# Patient Record
Sex: FEMALE | Race: BLACK OR AFRICAN AMERICAN | Employment: OTHER | URBAN - METROPOLITAN AREA
[De-identification: names, ages, dates, MRNs, and addresses within clinical notes are randomized per-mention and may not be internally consistent; named-entity substitution may affect disease eponyms.]

---

## 2017-04-28 ENCOUNTER — HOSPITAL ENCOUNTER (OUTPATIENT)
Dept: MAMMOGRAPHY | Age: 82
Discharge: HOME | End: 2017-04-28
Attending: NURSE PRACTITIONER
Payer: MEDICARE

## 2017-04-28 ENCOUNTER — HOSPITAL ENCOUNTER (OUTPATIENT)
Dept: LAB | Age: 82
Discharge: HOME | End: 2017-04-28

## 2017-04-28 DIAGNOSIS — Z12.31 VISIT FOR SCREENING MAMMOGRAM: ICD-10-CM

## 2017-04-28 PROCEDURE — 99001 SPECIMEN HANDLING PT-LAB: CPT | Performed by: FAMILY MEDICINE

## 2017-04-28 PROCEDURE — 77063 BREAST TOMOSYNTHESIS BI: CPT

## 2017-05-10 ENCOUNTER — HOSPITAL ENCOUNTER (OUTPATIENT)
Dept: MAMMOGRAPHY | Age: 82
Discharge: HOME | End: 2017-05-10
Payer: MEDICARE

## 2017-05-10 DIAGNOSIS — R92.8 ABNORMAL MAMMOGRAM: ICD-10-CM

## 2017-05-10 PROCEDURE — 77065 DX MAMMO INCL CAD UNI: CPT

## 2017-06-06 ENCOUNTER — ANESTHESIA EVENT (OUTPATIENT)
Dept: ENDOSCOPY | Age: 82
End: 2017-06-06
Payer: MEDICARE

## 2017-06-07 ENCOUNTER — ANESTHESIA (OUTPATIENT)
Dept: ENDOSCOPY | Age: 82
End: 2017-06-07
Payer: MEDICARE

## 2017-06-07 PROCEDURE — 74011250636 HC RX REV CODE- 250/636

## 2017-06-07 RX ORDER — PROPOFOL 10 MG/ML
INJECTION, EMULSION INTRAVENOUS AS NEEDED
Status: DISCONTINUED | OUTPATIENT
Start: 2017-06-07 | End: 2017-06-07 | Stop reason: HOSPADM

## 2017-06-07 RX ADMIN — PROPOFOL 20 MG: 10 INJECTION, EMULSION INTRAVENOUS at 10:37

## 2017-06-07 RX ADMIN — PROPOFOL 20 MG: 10 INJECTION, EMULSION INTRAVENOUS at 10:48

## 2017-06-07 RX ADMIN — PROPOFOL 20 MG: 10 INJECTION, EMULSION INTRAVENOUS at 10:53

## 2017-06-07 RX ADMIN — PROPOFOL 20 MG: 10 INJECTION, EMULSION INTRAVENOUS at 10:45

## 2017-06-07 RX ADMIN — PROPOFOL 20 MG: 10 INJECTION, EMULSION INTRAVENOUS at 10:42

## 2017-06-07 RX ADMIN — PROPOFOL 70 MG: 10 INJECTION, EMULSION INTRAVENOUS at 10:35

## 2017-06-07 NOTE — ANESTHESIA PREPROCEDURE EVALUATION
Anesthetic History   No history of anesthetic complications            Review of Systems / Medical History  Patient summary reviewed and pertinent labs reviewed    Pulmonary  Within defined limits                 Neuro/Psych   Within defined limits           Cardiovascular    Hypertension: well controlled          Hyperlipidemia    Exercise tolerance: <4 METS     GI/Hepatic/Renal  Within defined limits              Endo/Other    Diabetes: well controlled, type 2         Other Findings   Comments:   Risk Factors for Postoperative nausea/vomiting:       History of postoperative nausea/vomiting? NO       Female? YES       Motion sickness? NO       Intended opioid administration for postoperative analgesia? NO      Smoking Abstinence  Current Smoker? NO  Elective Surgery? YES  Seen preoperatively by anesthesiologist or proxy prior to day of surgery? YES  Pt abstained from smoking 24 hours prior to anesthesia?  N/A           Physical Exam    Airway  Mallampati: III  TM Distance: 4 - 6 cm  Neck ROM: decreased range of motion   Mouth opening: Normal     Cardiovascular    Rhythm: regular  Rate: normal         Dental    Dentition: Poor dentition     Pulmonary  Breath sounds clear to auscultation               Abdominal  GI exam deferred       Other Findings            Anesthetic Plan    ASA: 3  Anesthesia type: MAC            Anesthetic plan and risks discussed with: Patient

## 2017-06-20 ENCOUNTER — TELEPHONE (OUTPATIENT)
Dept: SURGERY | Age: 82
End: 2017-06-20

## 2017-06-20 NOTE — TELEPHONE ENCOUNTER
----- Message from Kait Mccullough MD sent at 6/9/2017 12:38 PM EDT -----  Benign polyp(s). Repeat colonoscopy in 5 years as planned. Notified patient of pathology results. Yogi in [place] for 5 years. Patient understands.

## 2017-09-29 ENCOUNTER — HOSPITAL ENCOUNTER (OUTPATIENT)
Dept: LAB | Age: 82
Discharge: HOME | End: 2017-09-29

## 2017-09-29 PROCEDURE — 99001 SPECIMEN HANDLING PT-LAB: CPT | Performed by: FAMILY MEDICINE

## 2017-10-11 ENCOUNTER — HOSPITAL ENCOUNTER (EMERGENCY)
Age: 82
Discharge: HOME | End: 2017-10-11
Attending: EMERGENCY MEDICINE
Payer: MEDICARE

## 2017-10-11 ENCOUNTER — APPOINTMENT (OUTPATIENT)
Dept: GENERAL RADIOLOGY | Age: 82
End: 2017-10-11
Attending: EMERGENCY MEDICINE
Payer: MEDICARE

## 2017-10-11 ENCOUNTER — APPOINTMENT (OUTPATIENT)
Dept: CT IMAGING | Age: 82
End: 2017-10-11
Attending: EMERGENCY MEDICINE
Payer: MEDICARE

## 2017-10-11 VITALS
TEMPERATURE: 98.2 F | SYSTOLIC BLOOD PRESSURE: 135 MMHG | BODY MASS INDEX: 30.22 KG/M2 | WEIGHT: 177 LBS | OXYGEN SATURATION: 97 % | HEIGHT: 64 IN | RESPIRATION RATE: 18 BRPM | HEART RATE: 77 BPM | DIASTOLIC BLOOD PRESSURE: 70 MMHG
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
## 2017-10-11 DIAGNOSIS — H57.89 RED EYE: ICD-10-CM

## 2017-10-11 DIAGNOSIS — S09.90XA MINOR HEAD INJURY, INITIAL ENCOUNTER: ICD-10-CM

## 2017-10-11 DIAGNOSIS — M25.552 PAIN OF LEFT HIP JOINT: ICD-10-CM

## 2017-10-11 PROCEDURE — 99284 EMERGENCY DEPT VISIT MOD MDM: CPT

## 2017-10-11 PROCEDURE — 72170 X-RAY EXAM OF PELVIS: CPT

## 2017-10-11 PROCEDURE — 70450 CT HEAD/BRAIN W/O DYE: CPT

## 2017-10-11 NOTE — ED PROVIDER NOTES
HPI Comments: 81yo F w/ PMH of HTN, DM, cataracts p/a a fall. Pt was trying to change a light bulb when she fell from a chair 5 days ago. She bumped her head and her right hip. Since that time she had been ambulating normally without headaches. She noticed today that her L eye was very red and decided to come get checked out. Pt states her ophthalmologist Dr. Trish Schirmer told her to come in immediately if her eye turns red. Her visual acuity is unchanged from previous and she has no eye pain or headache. Patient is a 80 y.o. female presenting with fall, head injury, and eye irritation. Fall   Pertinent negatives include no fever, no nausea, no vomiting and no headaches. Head Injury    Pertinent negatives include no vomiting. Red Eye    Associated symptoms include head injury. Pertinent negatives include no nausea, no vomiting and no fever. Past Medical History:   Diagnosis Date    Bronchitis     Diabetes ([facility] [address]     Hyperlipemia     Hypertension        Past Surgical History:   Procedure Laterality Date    COLONOSCOPY N/A 6/7/2017    COLONOSCOPY / polypectomy performed by Tamera Buenrostro MD at [facility] ENDOSCOPY    HX CATARACT REMOVAL      HX COLONOSCOPY      2011         Family History:   Problem Relation Age of Onset    Heart Disease Mother     Diabetes Father        Social History     Social History    Marital status:      Spouse name: N/A    Number of children: N/A    Years of education: N/A     Occupational History    Not on file. Social History Main Topics    Smoking status: Never Smoker    Smokeless tobacco: Never Used    Alcohol use No    Drug use: No    Sexual activity: Not on file     Other Topics Concern    Not on file     Social History Narrative         ALLERGIES: Review of patient's allergies indicates no known allergies. Review of Systems   Constitutional: Negative for chills and fever. Respiratory: Negative for shortness of breath.     Cardiovascular: Negative for chest pain. Gastrointestinal: Negative for diarrhea, nausea and vomiting. Neurological: Negative for headaches. All other systems reviewed and are negative. Vitals:    10/11/17 1308   BP: 135/70   Pulse: 77   Resp: 18   Temp: 98.2 °F (36.8 °C)   SpO2: 97%   Weight: 80.3 kg (177 lb)   Height: 5' 4\" (1.626 m)            Physical Exam   Constitutional: She is oriented to person, place, and time. She appears well-developed and well-nourished. No distress. HENT:   Head: Normocephalic and atraumatic. Mouth/Throat: Oropharynx is clear and moist.   Eyes: EOM are normal. Right eye exhibits no discharge. Left eye exhibits no discharge. No scleral icterus. Injected conjunctiva in L eye, visual acuity at baseline b/l     Neck: Normal range of motion. Neck supple. No tracheal deviation present. Cardiovascular: Normal rate, regular rhythm and normal heart sounds. No murmur heard. Pulmonary/Chest: Effort normal and breath sounds normal. No respiratory distress. She has no wheezes. She has no rales. Abdominal: Soft. She exhibits no distension. There is no tenderness. There is no rebound and no guarding. Musculoskeletal: Normal range of motion. She exhibits no edema or deformity. Neurological: She is alert and oriented to person, place, and time. No cranial nerve deficit. Skin: Skin is warm and dry. She is not diaphoretic. Psychiatric: She has a normal mood and affect. Her behavior is normal. Judgment and thought content normal.        MDM  Number of Diagnoses or Management Options  Fall, initial encounter:   Minor head injury, initial encounter:   Pain of left hip joint:   Red eye:   Diagnosis management comments: 3:19 PM  Pt s/p fall 5 days ago with head bump and hip pain. Pelvis xray is negative and CT read is pending. Pt has acute red eye with no vision changes or pain.   Discussed with Dr. Amie Habermann from ophthalmology, he recommends outpatient f/u with him today before 4 or tomorrow at 8:30    CT negative pt d/c'ed. ED Course       Procedures         Vitals:  Patient Vitals for the past 12 hrs:   Temp Pulse Resp BP SpO2   10/11/17 1308 98.2 °F (36.8 °C) 77 18 135/70 97 %       Medications ordered:   Medications - No data to display      Lab findings:  No results found for this or any previous visit (from the past 12 hour(s)). X-Ray, CT or other radiology findings or impressions:  XR PELV AP ONLY   Final Result      CT HEAD WO CONT   Final Result          Disposition:  Diagnosis:   1. Fall, initial encounter    2. Minor head injury, initial encounter    3. Pain of left hip joint    4. Red eye        Disposition: Home    Follow-up Information     Follow up With Details Comments [address], [place]   [address] [facility] [phone] [id number] [facility] EMERGENCY DEPT  If symptoms worsen, As needed [address] [facility]  [phone]           Discharge Medication List as of 10/11/2017  3:25 PM      CONTINUE these medications which have NOT CHANGED    Details   CETIRIZINE HCL (CETIRIZINE PO) Take  by mouth., Historical Med      albuterol (PROAIR HFA) 90 mcg/actuation inhaler Take  by inhalation. , Historical Med      MONTELUKAST SODIUM (SINGULAIR PO) Take  by mouth., Historical Med      HYDROcodone-acetaminophen (NORCO) 5-325 mg per tablet Take 1 Tab by mouth every six (6) hours as needed for Pain. Max Daily Amount: 4 Tabs., Print, Disp-10 Tab, R-0      fluticasone-salmeterol (ADVAIR DISKUS) 100-50 mcg/dose diskus inhaler Take 1 Puff by inhalation every twelve (12) hours. , Historical Med      NEBULIZER by Does Not Apply route., Historical Med      Cholecalciferol, Vitamin D3, (VITAMIN D3) 1,000 unit cap Take  by mouth., Historical Med      cloNIDine (CATAPRESS) 0.2 mg tablet Take 0.2 mg by mouth two (2) times a day.  , Historical Med      glimepiride (AMARYL) 4 mg tablet Take 4 mg by mouth daily.   , Historical Med      lisinopril (PRINIVIL, ZESTRIL) 20 mg tablet Take 40 mg by mouth daily. , Historical Med      metoprolol (LOPRESSOR) 100 mg tablet Take 200 mg by mouth daily. , Historical Med      pravastatin (PRAVACHOL) 40 mg tablet Take 40 mg by mouth daily. , Historical Med      verapamil SR (CALAN-SR) 180 mg CR tablet Take 180 mg by mouth daily.   , Historical Med

## 2017-10-11 NOTE — ED TRIAGE NOTES
Patient states climbing and falling from chair from last Tuesday. States striking posterior head during fall. Denies LOC. Patient presents with redness to left eye. She states awakening this morning with left eye redness. C/o lower back pain.

## 2017-10-11 NOTE — DISCHARGE INSTRUCTIONS
Diabetes and Preventing Falls: Care Instructions  Your Care Instructions  If you are an older adult who has diabetes, you may have a higher risk of falling. Complications of diabetes--such as nerve damage, foot problems, and reduced vision--may increase your risk of a fall. Some of your medicines also may add to your risk. By making your home safer, you can lower your risk of falling. Doing things to prevent diabetes complications may also help to lower your risk. You can make your home safer with a few simple measures. Follow-up care is a key part of your treatment and safety. Be sure to make and go to all appointments, and call your doctor if you are having problems. It's also a good idea to know your test results and keep a list of the medicines you take. How can you care for yourself at home? Taking care of yourself  · Keep your blood sugar at a target level (which you set with your doctor). · Exercise regularly to improve your strength, muscle tone, and balance. Walk if you can. Swimming may be a good choice if you cannot walk easily. · Have your vision checked as often as your doctor recommends. It is usually once a year or more often if you have eye problems. · Know the side effects of the medicines you take. Ask your doctor or pharmacist whether the medicines you take can affect your balance. Sleeping pills or sedatives can affect your balance. · Limit the amount of alcohol you drink. Alcohol can impair your balance and other senses. · Have your doctor check your feet during each visit. If you have a foot problem, see your doctor. Preventing falls at home  · Remove raised doorway thresholds, throw rugs, and clutter. Repair loose carpet or raised areas in the floor. · Move furniture and electrical cords to keep them out of walking paths. · Use nonskid floor wax, and wipe up spills right away, especially on ceramic tile floors. · If you use a walker or cane, put rubber tips on it.  If you use crutches, clean the bottoms of them regularly with an abrasive pad, such as steel wool. · Keep your house well lit, especially [place], and outside walkways. Use night-lights in areas such as hallways and bathrooms. Add extra light switches or use remote switches (such as switches that go on or off when you clap your hands) to make it easier to turn lights on if you have to get up during the night. · Install sturdy handrails on stairways. Put grab bars near your shower, bathtub, and toilet. · Store household items on low shelves so that you do not have to climb or reach high. Or use a reaching device that you can get at a medical supply store. If you have to climb for something, use a step stool with handrails, or ask someone to get it for you. · Keep a cordless phone and a flashlight with new batteries by your bed. If possible, put a phone in each of the main rooms of your house, or carry a cell phone in case you fall and cannot reach a phone. Or you can wear a device around your neck or wrist. You push a button that sends a signal for help. · Wear low-heeled shoes that fit well and give your feet good support. Use footwear with nonskid soles. Check the heels and soles of your shoes for wear. Repair or replace worn heels or soles. · Do not wear socks without shoes on wood floors. · Walk on the grass when the sidewalks are slippery. If you live in an area that gets snow and ice in the winter, sprinkle salt on slippery steps and sidewalks. Where can you learn more? Go to http://ranjith-pia.info/. Enter C055 in the search box to learn more about \"Diabetes and Preventing Falls: Care Instructions. \"  Current as of: August 4, 2016  Content Version: 11.3  © 1297-8884 [employer]. Care instructions adapted under license by [employer] (which disclaims liability or warranty for this information).  If you have questions about a medical condition or this instruction, always ask your healthcare professional. [place] [address] any warranty or liability for your use of this information.

## 2017-10-11 NOTE — ED NOTES
Lorie Doss is a 80 y.o. female that was discharged in stable. Pt was accompanied by daughter. Pt is not driving. The patients diagnosis, condition and treatment were explained to  patient and caregiver and aftercare instructions were given. The patient and caregiver verbalized understanding. Patient armband removed and shredded.

## 2017-10-12 ENCOUNTER — DOCUMENTATION ONLY (OUTPATIENT)
Dept: SURGERY | Age: 82
End: 2017-10-12

## 2017-11-29 ENCOUNTER — HOSPITAL ENCOUNTER (EMERGENCY)
Age: 82
Discharge: HOME | End: 2017-11-29
Attending: EMERGENCY MEDICINE
Payer: MEDICARE

## 2017-11-29 ENCOUNTER — APPOINTMENT (OUTPATIENT)
Dept: GENERAL RADIOLOGY | Age: 82
End: 2017-11-29
Attending: EMERGENCY MEDICINE
Payer: MEDICARE

## 2017-11-29 VITALS
DIASTOLIC BLOOD PRESSURE: 77 MMHG | BODY MASS INDEX: 29.88 KG/M2 | TEMPERATURE: 97.8 F | RESPIRATION RATE: 18 BRPM | HEIGHT: 64 IN | OXYGEN SATURATION: 95 % | WEIGHT: 175 LBS | SYSTOLIC BLOOD PRESSURE: 148 MMHG | HEART RATE: 67 BPM

## 2017-11-29 DIAGNOSIS — S60.222A CONTUSION OF LEFT HAND, INITIAL ENCOUNTER: Primary | ICD-10-CM
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
## 2017-11-29 DIAGNOSIS — T07.XXXA ABRASION, MULTIPLE SITES: ICD-10-CM

## 2017-11-29 PROCEDURE — 90715 TDAP VACCINE 7 YRS/> IM: CPT | Performed by: EMERGENCY MEDICINE

## 2017-11-29 PROCEDURE — 73130 X-RAY EXAM OF HAND: CPT

## 2017-11-29 PROCEDURE — 90471 IMMUNIZATION ADMIN: CPT

## 2017-11-29 PROCEDURE — 74011250636 HC RX REV CODE- 250/636: Performed by: EMERGENCY MEDICINE

## 2017-11-29 PROCEDURE — 99282 EMERGENCY DEPT VISIT SF MDM: CPT

## 2017-11-29 RX ORDER — TRAMADOL HYDROCHLORIDE 50 MG/1
50 TABLET ORAL
Qty: 10 TAB | Refills: 0 | Status: SHIPPED | OUTPATIENT
Start: 2017-11-29 | End: 2019-02-02

## 2017-11-29 RX ADMIN — TETANUS TOXOID, REDUCED DIPHTHERIA TOXOID AND ACELLULAR PERTUSSIS VACCINE, ADSORBED 0.5 ML: 5; 2.5; 8; 8; 2.5 SUSPENSION INTRAMUSCULAR at 13:16

## 2017-11-29 NOTE — ED TRIAGE NOTES
Patient states trip and fall incident prior to arrival to ER. She denies LOC. States striking mouth on concrete during fall. Patient noted to have bandaids to right fingers. Patient noted to have swelling and bruising to left hand. Denies pain to hand. Abrasion noted to left knee.

## 2017-11-29 NOTE — ED NOTES
Luis Martínez is a 80 y.o. female that was discharged in stable condition. The patients diagnosis, condition and treatment were explained to  patient and aftercare instructions were given. The patient verbalized understanding. Patient armband removed and shredded.

## 2017-11-29 NOTE — DISCHARGE INSTRUCTIONS
Hand Bruises: Care Instructions  Your Care Instructions  Bruises, or contusions, can happen as a result of an impact or fall. Most people think of a bruise as a black-and-blue spot. This happens when small blood vessels get torn and leak blood under the skin. The bruise may turn purplish black, reddish blue, or yellowish green as it heals. But bones and muscles can also get bruised. This may damage the hand but not cause a bruise that you can see. Most bruises aren't serious and will go away on their own in 2 to 4 weeks. But sometimes a more serious hand injury might not heal on its own. Tell your doctor if you have new symptoms or your injury is not getting better over time. You may have tests to see if you have bone or nerve damage. These tests may include X-rays, a CT scan, or an MRI. If you damaged bones or muscles, you may need more treatment. The doctor has checked you carefully, but problems can develop later. If you notice any problems or new symptoms, get medical treatment right away. Follow-up care is a key part of your treatment and safety. Be sure to make and go to all appointments, and call your doctor if you are having problems. It's also a good idea to know your test results and keep a list of the medicines you take. How can you care for yourself at home? · Put ice or a cold pack on the hand for 10 to 20 minutes at a time. Put a thin cloth between the ice and your skin. · Prop up your hand on a pillow when you ice it or anytime you sit or lie down during the next 3 days. Try to keep your hand above the level of your heart. This will help reduce swelling. · Be safe with medicines. Read and follow all instructions on the label. ¨ If the doctor gave you a prescription medicine for pain, take it as prescribed. ¨ If you are not taking a prescription pain medicine, ask your doctor if you can take an over-the-counter medicine.   · Be sure to follow your doctor's advice about moving and exercising your injured hand. When should you call for help? Call your doctor now or seek immediate medical care if:  ? · Your pain gets worse. ? · You have new or worse swelling. ? · You have tingling, weakness, or numbness in the area near the bruise. ? · The area near the bruise is cold or pale. ? · You have symptoms of infection, such as:  ¨ Increased pain, swelling, warmth, or redness. ¨ Red streaks leading from the area. ¨ Pus draining from the area. ¨ A fever. ? Watch closely for changes in your health, and be sure to contact your doctor if:  ? · You do not get better as expected. Where can you learn more? Go to http://ranjith-pia.info/. Enter J096 in the search box to learn more about \"Hand Bruises: Care Instructions. \"  Current as of: March 20, 2017  Content Version: 11.4  © 7995-0550 [employer]. Care instructions adapted under license by [employer] (which disclaims liability or warranty for this information). If you have questions about a medical condition or this instruction, always ask your healthcare professional. [place] [address] any warranty or liability for your use of this information. Preventing Falls: Care Instructions  Your Care Instructions    Getting around your home safely can be a challenge if you have injuries or health problems that make it easy for you to fall. Loose rugs and furniture in walkways are among the dangers for many older people who have problems walking or who have poor eyesight. People who have conditions such as arthritis, osteoporosis, or dementia also have to be careful not to fall. You can make your home safer with a few simple measures. Follow-up care is a key part of your treatment and safety. Be sure to make and go to all appointments, and call your doctor if you are having problems. It's also a good idea to know your test results and keep a list of the medicines you take.   How can you care for yourself at home? Taking care of yourself  · You may get dizzy if you do not drink enough water. To prevent dehydration, drink plenty of fluids, enough so that your urine is light yellow or clear like water. Choose water and other caffeine-free clear liquids. If you have kidney, heart, or liver disease and have to limit fluids, talk with your doctor before you increase the amount of fluids you drink. · Exercise regularly to improve your strength, muscle tone, and balance. Walk if you can. Swimming may be a good choice if you cannot walk easily. · Have your vision and hearing checked each year or any time you notice a change. If you have trouble seeing and hearing, you might not be able to avoid objects and could lose your balance. · Know the side effects of the medicines you take. Ask your doctor or pharmacist whether the medicines you take can affect your balance. Sleeping pills or sedatives can affect your balance. · Limit the amount of alcohol you drink. Alcohol can impair your balance and other senses. · Ask your doctor whether calluses or corns on your feet need to be removed. If you wear loose-fitting shoes because of calluses or corns, you can lose your balance and fall. · Talk to your doctor if you have numbness in your feet. Preventing falls at home  · Remove raised doorway thresholds, throw rugs, and clutter. Repair loose carpet or raised areas in the floor. · Move furniture and electrical cords to keep them out of walking paths. · Use nonskid floor wax, and wipe up spills right away, especially on ceramic tile floors. · If you use a walker or cane, put rubber tips on it. If you use crutches, clean the bottoms of them regularly with an abrasive pad, such as steel wool. · Keep your house well lit, especially [place], and outside walkways. Use night-lights in areas such as hallways and bathrooms.  Add extra light switches or use remote switches (such as switches that go on or off when you clap your hands) to make it easier to turn lights on if you have to get up during the night. · Install sturdy handrails on stairways. · Move items in your cabinets so that the things you use a lot are on the lower shelves (about waist level). · Keep a cordless phone and a flashlight with new batteries by your bed. If possible, put a phone in each of the main rooms of your house, or carry a cell phone in case you fall and cannot reach a phone. Or, you can wear a device around your neck or wrist. You push a button that sends a signal for help. · Wear low-heeled shoes that fit well and give your feet good support. Use footwear with nonskid soles. Check the heels and soles of your shoes for wear. Repair or replace worn heels or soles. · Do not wear socks without shoes on wood floors. · Walk on the grass when the sidewalks are slippery. If you live in an area that gets snow and ice in the winter, sprinkle salt on slippery steps and sidewalks. Preventing falls in the bath  · Install grab bars and nonskid mats inside and outside your shower or tub and near the toilet and sinks. · Use shower chairs and bath benches. · Use a hand-held shower head that will allow you to sit while showering. · Get into a tub or shower by putting the weaker leg in first. Get out of a tub or shower with your strong side first.  · Repair loose toilet seats and consider installing a raised toilet seat to make getting on and off the toilet easier. · Keep your bathroom door unlocked while you are in the shower. Where can you learn more? Go to http://ranjith-pia.info/. Enter 0476 [id number] 69 [id number] in the search box to learn more about \"Preventing Falls: Care Instructions. \"  Current as of: May 12, 2017  Content Version: 11.4  © 6989-7460 [email or web address]. Care instructions adapted under license by [employer] (which disclaims liability or warranty for this information).  If you have questions about a medical condition or this instruction, always ask your healthcare professional. [place] [address] any warranty or liability for your use of this information.

## 2017-11-29 NOTE — ED PROVIDER NOTES
EMERGENCY DEPARTMENT HISTORY AND PHYSICAL EXAM    12:29 PM      Date: 11/29/2017  Patient Name: [facility]    History of Presenting Illness     Chief Complaint   Patient presents with   Patricio José Fall    Mouth Injury    Finger Pain         History Provided By: Patient    Chief Complaint: Fall, mouth injury   Duration:  N/A  Timing:  N/A  Location: Mouth   Quality: N/A  Severity: 0 out 10   Modifying Factors: None   Associated Symptoms: left hand swelling, small abrasions to the upper lip, right hand and left knee       Additional History (Context): [facility] is a 80 y.o. female with hx of HTN, HLD and DM presenting to the ED with c/o mouth injury after a fall that occurred right before arrival to the ER. Pt reports she tripped on uneven concrete and hit her mouth on the concrete. Pt denies headache, gum soreness, dental injury, bilat hand pain, LOC, nausea, vomiting or diarrhea. Pt has small abrasions to the upper lip, left knee and right hand. Severity is 0/10. Pt also has some moderate left hand swelling, a hematoma noted. Pt denies any other injuries. No other sx or complaints given at this time. PCP: Everardo Moore MD    Current Facility-Administered Medications   Medication Dose Route Frequency Provider Last Rate Last Dose    diph,Pertuss(AC),Tet Vac-PF (BOOSTRIX) suspension 0.5 mL  0.5 mL IntraMUSCular ONCE Equilla Axe, DO         Current Outpatient Prescriptions   Medication Sig Dispense Refill    traMADol (ULTRAM) 50 mg tablet Take 1 Tab by mouth every eight (8) hours as needed for Pain. Max Daily Amount: 150 mg. 10 Tab 0    CETIRIZINE HCL (CETIRIZINE PO) Take  by mouth.  albuterol (PROAIR HFA) 90 mcg/actuation inhaler Take  by inhalation.  MONTELUKAST SODIUM (SINGULAIR PO) Take  by mouth.  fluticasone-salmeterol (ADVAIR DISKUS) 100-50 mcg/dose diskus inhaler Take 1 Puff by inhalation every twelve (12) hours.       NEBULIZER by Does Not Apply route.      Cholecalciferol, Vitamin D3, (VITAMIN D3) 1,000 unit cap Take  by mouth.  cloNIDine (CATAPRESS) 0.2 mg tablet Take 0.2 mg by mouth two (2) times a day.  glimepiride (AMARYL) 4 mg tablet Take 4 mg by mouth daily.  lisinopril (PRINIVIL, ZESTRIL) 20 mg tablet Take 40 mg by mouth daily.  metoprolol (LOPRESSOR) 100 mg tablet Take 200 mg by mouth daily.  pravastatin (PRAVACHOL) 40 mg tablet Take 40 mg by mouth daily.  verapamil SR (CALAN-SR) 180 mg CR tablet Take 180 mg by mouth daily. Past History     Past Medical History:  Past Medical History:   Diagnosis Date    Bronchitis     Diabetes ([address]     Hyperlipemia     Hypertension        Past Surgical History:  Past Surgical History:   Procedure Laterality Date    COLONOSCOPY N/A 6/7/2017    COLONOSCOPY / polypectomy performed by Kartik Evans MD at [facility] ENDOSCOPY    HX CATARACT REMOVAL      HX COLONOSCOPY      2011       Family History:  Family History   Problem Relation Age of Onset    Heart Disease Mother     Diabetes Father        Social History:  Social History   Substance Use Topics    Smoking status: Never Smoker    Smokeless tobacco: Never Used    Alcohol use No       Allergies:  No Known Allergies      Review of Systems       Review of Systems   Constitutional: Negative for fever. HENT: Negative for sore throat. Eyes: Negative for redness. Respiratory: Negative for shortness of breath and wheezing. Gastrointestinal: Negative for abdominal pain and nausea. Genitourinary: Negative for dysuria. Musculoskeletal: Negative for back pain, neck pain and neck stiffness. Left hand swelling    Skin: Negative for pallor. Small abrasions on left knee and right hand   Tiny abrasions to the upper lip    Neurological: Negative for headaches. Hematological: Does not bruise/bleed easily. All other systems reviewed and are negative.         Physical Exam     Visit Vitals    BP 148/77    Pulse 67    Temp 97.8 °F (36.6 °C)    Resp 18    Ht 5' 4\" (1.626 m)    Wt 79.4 kg (175 lb)    SpO2 95%    BMI 30.04 kg/m2         Physical Exam   Constitutional: She is oriented to person, place, and time. She appears well-developed and well-nourished. No distress. Good radial pulse    HENT:   Head: Normocephalic and atraumatic. Mouth/Throat: Oropharynx is clear and moist.   No dental injury   Minimal swelling of upper lip   Tiny abrasions on lips   No other facial bruising   No raccoon eyes   No henson signs    Eyes: Conjunctivae are normal. Pupils are equal, round, and reactive to light. No scleral icterus. Neck: Normal range of motion. Neck supple. Cardiovascular: Intact distal pulses. Capillary refill < 3 seconds  Good radial pulses    Pulmonary/Chest: Effort normal and breath sounds normal. No respiratory distress. She has no wheezes. Abdominal: Soft. Bowel sounds are normal. She exhibits no distension. There is no tenderness. Musculoskeletal: Normal range of motion. She exhibits no edema. Left hand tenderness and swelling   Hematoma at the left hand   Full ROM of head and neck   No midline tenderness   No hip tenderness   Full ROM of bilat LE and UE      Lymphadenopathy:     She has no cervical adenopathy. Neurological: She is alert and oriented to person, place, and time. No cranial nerve deficit. Sensation intact   No slurred speech      Skin: Skin is warm and dry. She is not diaphoretic. Abrasion on the left knee      Nursing note and vitals reviewed. Diagnostic Study Results     Labs -  No results found for this or any previous visit (from the past 12 hour(s)). Radiologic Studies -   XR HAND LT MIN 3 V   Impression:     Soft tissue swelling without fracture. Medical Decision Making   I am the first provider for this patient.     I reviewed the vital signs, available nursing notes, past medical history, past surgical history, family history and social history. Vital Signs-Reviewed the patient's vital signs. ED Course: Progress Notes, Reevaluation, and Consults:      Provider Notes (Medical Decision Making): ddx contusion, fx, strain, abrasion    Pt does not want analgesia in ED, states not in much pain. Gave tdap. I have reassessed the patient. I have discussed the workup, results and plan with the patient and patient is in agreement. Patient is feeling better. Patient will be prescribed tramadol. Patient was discharge in stable condition. Patient was given outpatient follow up. Patient is to return to emergency department if any new or worsening condition. Diagnosis     Clinical Impression:   1. Contusion of left hand, initial encounter    2. Abrasion, multiple sites    3. Fall, initial encounter        Disposition: Discharge     Follow-up Information     Follow up With Details Sahra Emmanuel MD Call in 2 days  [place] [address] [facility] [address] [facility]      Shane Millan MD Call in 2 days  [address] [place] [address] [place] [id number]      [address] [facility] EMERGENCY DEPT  As needed, If symptoms worsen [address] [facility]  [phone]           Patient's Medications   Start Taking    TRAMADOL (ULTRAM) 50 MG TABLET    Take 1 Tab by mouth every eight (8) hours as needed for Pain. Max Daily Amount: 150 mg. Continue Taking    ALBUTEROL (PROAIR HFA) 90 MCG/ACTUATION INHALER    Take  by inhalation. CETIRIZINE HCL (CETIRIZINE PO)    Take  by mouth. CHOLECALCIFEROL, VITAMIN D3, (VITAMIN D3) 1,000 UNIT CAP    Take  by mouth. CLONIDINE (CATAPRESS) 0.2 MG TABLET    Take 0.2 mg by mouth two (2) times a day. FLUTICASONE-SALMETEROL (ADVAIR DISKUS) 100-50 MCG/DOSE DISKUS INHALER    Take 1 Puff by inhalation every twelve (12) hours. GLIMEPIRIDE (AMARYL) 4 MG TABLET    Take 4 mg by mouth daily.       LISINOPRIL (PRINIVIL, ZESTRIL) 20 MG TABLET    Take 40 mg by mouth daily. METOPROLOL (LOPRESSOR) 100 MG TABLET    Take 200 mg by mouth daily. MONTELUKAST SODIUM (SINGULAIR PO)    Take  by mouth. NEBULIZER    by Does Not Apply route. PRAVASTATIN (PRAVACHOL) 40 MG TABLET    Take 40 mg by mouth daily. VERAPAMIL SR (CALAN-SR) 180 MG CR TABLET    Take 180 mg by mouth daily. These Medications have changed    No medications on file   Stop Taking    HYDROCODONE-ACETAMINOPHEN (NORCO) 5-325 MG PER TABLET    Take 1 Tab by mouth every six (6) hours as needed for Pain. Max Daily Amount: 4 Tabs.     _______________________________    Attestations:  Sariah Milner [facility] acting as a scribe for and in the presence of Roxy Deluca DO      November 29, 2017 at 12:29 PM       Provider Attestation:      I personally performed the services described in the documentation, reviewed the documentation, as recorded by the scribe in my presence, and it accurately and completely records my words and actions.  November 29, 2017 at 12:29 PM - Roxy Deluca DO    _______________________________

## 2018-04-29 ENCOUNTER — HOSPITAL ENCOUNTER (EMERGENCY)
Age: 83
Discharge: HOME | End: 2018-04-29
Attending: EMERGENCY MEDICINE
Payer: MEDICARE

## 2018-04-29 ENCOUNTER — APPOINTMENT (OUTPATIENT)
Dept: GENERAL RADIOLOGY | Age: 83
End: 2018-04-29
Attending: EMERGENCY MEDICINE
Payer: MEDICARE

## 2018-04-29 VITALS
HEART RATE: 81 BPM | TEMPERATURE: 97.6 F | WEIGHT: 170 LBS | OXYGEN SATURATION: 99 % | RESPIRATION RATE: 16 BRPM | SYSTOLIC BLOOD PRESSURE: 210 MMHG | BODY MASS INDEX: 27.32 KG/M2 | DIASTOLIC BLOOD PRESSURE: 92 MMHG | HEIGHT: 66 IN

## 2018-04-29 DIAGNOSIS — I10 ESSENTIAL HYPERTENSION: ICD-10-CM

## 2018-04-29 DIAGNOSIS — J20.9 ACUTE BRONCHITIS WITH BRONCHOSPASM: Primary | ICD-10-CM

## 2018-04-29 PROCEDURE — 94640 AIRWAY INHALATION TREATMENT: CPT

## 2018-04-29 PROCEDURE — 77030029684 HC NEB SM VOL KT MONA -A

## 2018-04-29 PROCEDURE — 71046 X-RAY EXAM CHEST 2 VIEWS: CPT

## 2018-04-29 PROCEDURE — 99282 EMERGENCY DEPT VISIT SF MDM: CPT

## 2018-04-29 PROCEDURE — 74011000250 HC RX REV CODE- 250: Performed by: EMERGENCY MEDICINE

## 2018-04-29 RX ORDER — IPRATROPIUM BROMIDE AND ALBUTEROL SULFATE 2.5; .5 MG/3ML; MG/3ML
3 SOLUTION RESPIRATORY (INHALATION)
Status: COMPLETED | OUTPATIENT
Start: 2018-04-29 | End: 2018-04-29

## 2018-04-29 RX ORDER — DOXYCYCLINE 100 MG/1
100 CAPSULE ORAL 2 TIMES DAILY
Qty: 20 CAP | Refills: 0 | Status: SHIPPED | OUTPATIENT
Start: 2018-04-29 | End: 2019-02-02

## 2018-04-29 RX ORDER — GUAIFENESIN 600 MG/1
600 TABLET, EXTENDED RELEASE ORAL 2 TIMES DAILY
Qty: 20 TAB | Refills: 0 | Status: SHIPPED | OUTPATIENT
Start: 2018-04-29 | End: 2019-02-02

## 2018-04-29 RX ADMIN — IPRATROPIUM BROMIDE AND ALBUTEROL SULFATE 3 ML: .5; 3 SOLUTION RESPIRATORY (INHALATION) at 11:10

## 2018-04-29 NOTE — ED TRIAGE NOTES
Pt co  Increase in wheezing and cough over last few days states she is out of ventolin neb  Treatments awaiting approval from  insurance

## 2018-04-29 NOTE — ED NOTES
Jolene Melvin is a 80 y.o. female that was discharged in stable condition. The patients diagnosis, condition and treatment were explained to  patient and aftercare instructions were given. The patient verbalized understanding. Patient armband removed and shredded.

## 2018-04-29 NOTE — ED PROVIDER NOTES
EMERGENCY DEPARTMENT HISTORY AND PHYSICAL EXAM    10:45 AM      Date: 4/29/2018  Patient Name: [facility]    History of Presenting Illness     Chief Complaint   Patient presents with    Cough         History Provided By: Patient    Chief Complaint: Cough  Duration:  1 Week  Timing:  Constant  Location:   Quality: Productive  Severity:   Modifying Factors: Inhaler use  Associated Symptoms: nasal congestion      Additional History (Context): [facility] is a 80 y.o. female  who presents with c/o constant productive cough onset 1 week. Pt states having an inhaler at home (secondary to previous bronchitis dx) with use at 3AM (8 hours ago) with minimal relief of sx. Pt reports associated sx of nasal congestion. Denies hx of asthma, COPD, or emphysema. PCP: Sherry Nichols MD    Current Outpatient Prescriptions   Medication Sig Dispense Refill    doxycycline (MONODOX) 100 mg capsule Take 1 Cap by mouth two (2) times a day. 20 Cap 0    guaiFENesin ER (MUCINEX) 600 mg ER tablet Take 1 Tab by mouth two (2) times a day. 20 Tab 0    traMADol (ULTRAM) 50 mg tablet Take 1 Tab by mouth every eight (8) hours as needed for Pain. Max Daily Amount: 150 mg. 10 Tab 0    CETIRIZINE HCL (CETIRIZINE PO) Take  by mouth.  albuterol (PROAIR HFA) 90 mcg/actuation inhaler Take  by inhalation.  MONTELUKAST SODIUM (SINGULAIR PO) Take  by mouth.  fluticasone-salmeterol (ADVAIR DISKUS) 100-50 mcg/dose diskus inhaler Take 1 Puff by inhalation every twelve (12) hours.  NEBULIZER by Does Not Apply route.  Cholecalciferol, Vitamin D3, (VITAMIN D3) 1,000 unit cap Take  by mouth.  cloNIDine (CATAPRESS) 0.2 mg tablet Take 0.2 mg by mouth two (2) times a day.  glimepiride (AMARYL) 4 mg tablet Take 4 mg by mouth daily.  lisinopril (PRINIVIL, ZESTRIL) 20 mg tablet Take 40 mg by mouth daily.  metoprolol (LOPRESSOR) 100 mg tablet Take 200 mg by mouth daily.  pravastatin (PRAVACHOL) 40 mg tablet Take 40 mg by mouth daily.  verapamil SR (CALAN-SR) 180 mg CR tablet Take 180 mg by mouth daily. Past History     Past Medical History:  Past Medical History:   Diagnosis Date    Bronchitis     Diabetes ([address]     Hyperlipemia     Hypertension        Past Surgical History:  Past Surgical History:   Procedure Laterality Date    COLONOSCOPY N/A 6/7/2017    COLONOSCOPY / polypectomy performed by Albino Oquendo MD at [facility] ENDOSCOPY    HX CATARACT REMOVAL      HX COLONOSCOPY      2011       Family History:  Family History   Problem Relation Age of Onset    Heart Disease Mother     Diabetes Father        Social History:  Social History   Substance Use Topics    Smoking status: Never Smoker    Smokeless tobacco: Never Used    Alcohol use No       Allergies:  No Known Allergies      Review of Systems       Review of Systems   HENT: Positive for congestion. Respiratory: Positive for cough and wheezing. All other systems reviewed and are negative. Physical Exam     Visit Vitals    BP (!) 210/92 (BP 1 Location: Left arm, BP Patient Position: At rest)  Comment: pt states has not taken BP meds this am    Pulse 81    Temp 97.6 °F (36.4 °C)    Resp 16    Ht 5' 6\" (1.676 m)    Wt 77.1 kg (170 lb)    SpO2 99%    BMI 27.44 kg/m2         Physical Exam   Constitutional: She is oriented to person, place, and time. She appears well-developed and well-nourished. No distress. HENT:   Head: Normocephalic and atraumatic. Mouth/Throat: No oropharyngeal exudate. Nose congested. TMs normal   Eyes: No scleral icterus. Cardiovascular: Normal rate and regular rhythm. No murmur heard. Pulmonary/Chest: Effort normal. She has wheezes (scattered end-expiratory wheezes). Abdominal: Soft. Musculoskeletal: She exhibits no edema. Neurological: She is alert and oriented to person, place, and time. Skin: Skin is warm and dry.    Psychiatric: She has a normal mood and affect. Nursing note and vitals reviewed. Diagnostic Study Results     Labs -  No results found for this or any previous visit (from the past 12 hour(s)). Radiologic Studies -   XR CHEST PA LAT   Final Result   Impression:  1. No acute cardiopulmonary process. Medical Decision Making   I am the first provider for this patient. I reviewed the vital signs, available nursing notes, past medical history, past surgical history, family history and social history. Vital Signs-Reviewed the patient's vital signs. ED Course: Progress Notes, Reevaluation, and Consults:  IMP: Acute bronchitis w/ mild bronchospasm. Duoneb administered. CXR ok. Diagnosis     Clinical Impression:   1. Acute bronchitis with bronchospasm    2. Essential hypertension      1) Doxycycline  2) Mucinex  3) Continue Proair inhaler  4) Chest x-ray negative for pneumonia  5) See your PCP for recheck in 3-5 days if not improving  6) Return for worsening wheezing, high fever, shortness of breath. 7) Blood pressure elevated to 210/92 today. Be sure to take all your blood pressure medications and recheck with your doctor later this week    Disposition: home    Follow-up Information     Follow up With Details Comments [address] [place], [place] [address] Street, MD  for repeat blood pressure check, for recheck of ongoing symptoms [address] [facility]  [phone]             Patient's Medications   Start Taking    DOXYCYCLINE (MONODOX) 100 MG CAPSULE    Take 1 Cap by mouth two (2) times a day. GUAIFENESIN ER (MUCINEX) 600 MG ER TABLET    Take 1 Tab by mouth two (2) times a day. Continue Taking    ALBUTEROL (PROAIR HFA) 90 MCG/ACTUATION INHALER    Take  by inhalation. CETIRIZINE HCL (CETIRIZINE PO)    Take  by mouth. CHOLECALCIFEROL, VITAMIN D3, (VITAMIN D3) 1,000 UNIT CAP    Take  by mouth. CLONIDINE (CATAPRESS) 0.2 MG TABLET    Take 0.2 mg by mouth two (2) times a day. FLUTICASONE-SALMETEROL (ADVAIR DISKUS) 100-50 MCG/DOSE DISKUS INHALER    Take 1 Puff by inhalation every twelve (12) hours. GLIMEPIRIDE (AMARYL) 4 MG TABLET    Take 4 mg by mouth daily. LISINOPRIL (PRINIVIL, ZESTRIL) 20 MG TABLET    Take 40 mg by mouth daily. METOPROLOL (LOPRESSOR) 100 MG TABLET    Take 200 mg by mouth daily. MONTELUKAST SODIUM (SINGULAIR PO)    Take  by mouth. NEBULIZER    by Does Not Apply route. PRAVASTATIN (PRAVACHOL) 40 MG TABLET    Take 40 mg by mouth daily. TRAMADOL (ULTRAM) 50 MG TABLET    Take 1 Tab by mouth every eight (8) hours as needed for Pain. Max Daily Amount: 150 mg. VERAPAMIL SR (CALAN-SR) 180 MG CR TABLET    Take 180 mg by mouth daily. These Medications have changed    No medications on file   Stop Taking    No medications on file     _______________________________    Attestations:  [id number] acting as a scribe for and in the presence of Lan Oliver MD      April 29, 2018 at 10:53 AM       Provider Attestation:      I personally performed the services described in the documentation, reviewed the documentation, as recorded by the scribe in my presence, and it accurately and completely records my words and actions.  April 29, 2018 at 10:53 AM - Lan Oliver MD    _______________________________

## 2018-04-29 NOTE — DISCHARGE INSTRUCTIONS
Bronchitis: Care Instructions  Your Care Instructions    Bronchitis is inflammation of the bronchial tubes, which carry air to the lungs. The tubes swell and produce mucus, or phlegm. The mucus and inflamed bronchial tubes make you cough. You may have trouble breathing. Most cases of bronchitis are caused by viruses like those that cause colds. Antibiotics usually do not help and they may be harmful. Bronchitis usually develops rapidly and lasts about 2 to 3 weeks in otherwise healthy people. Follow-up care is a key part of your treatment and safety. Be sure to make and go to all appointments, and call your doctor if you are having problems. It's also a good idea to know your test results and keep a list of the medicines you take. How can you care for yourself at home? · Take all medicines exactly as prescribed. Call your doctor if you think you are having a problem with your medicine. · Get some extra rest.  · Take an over-the-counter pain medicine, such as acetaminophen (Tylenol), ibuprofen (Advil, Motrin), or naproxen (Aleve) to reduce fever and relieve body aches. Read and follow all instructions on the label. · Do not take two or more pain medicines at the same time unless the doctor told you to. Many pain medicines have acetaminophen, which is Tylenol. Too much acetaminophen (Tylenol) can be harmful. · Take an over-the-counter cough medicine that contains dextromethorphan to help quiet a dry, hacking cough so that you can sleep. Avoid cough medicines that have more than one active ingredient. Read and follow all instructions on the label. · Breathe moist air from a humidifier, hot shower, or sink filled with hot water. The heat and moisture will thin mucus so you can cough it out. · Do not smoke. Smoking can make bronchitis worse. If you need help quitting, talk to your doctor about stop-smoking programs and medicines. These can increase your chances of quitting for good.   When should you call for help? Call 911 anytime you think you may need emergency care. For example, call if:  ? · You have severe trouble breathing. ?Call your doctor now or seek immediate medical care if:  ? · You have new or worse trouble breathing. ? · You cough up dark brown or bloody mucus (sputum). ? · You have a new or higher fever. ? · You have a new rash. ? Watch closely for changes in your health, and be sure to contact your doctor if:  ? · You cough more deeply or more often, especially if you notice more mucus or a change in the color of your mucus. ? · You are not getting better as expected. Where can you learn more? Go to http://ranjith-pia.info/. Enter H333 in the search box to learn more about \"Bronchitis: Care Instructions. \"  Current as of: May 12, 2017  Content Version: 11.4  © 7958-2336 [employer]. Care instructions adapted under license by [employer] (which disclaims liability or warranty for this information). If you have questions about a medical condition or this instruction, always ask your healthcare professional. [address] any warranty or liability for your use of this information.

## 2018-08-09 ENCOUNTER — HOSPITAL ENCOUNTER (OUTPATIENT)
Dept: MAMMOGRAPHY | Age: 83
Discharge: HOME | End: 2018-08-09
Attending: FAMILY MEDICINE
Payer: MEDICARE

## 2018-08-09 DIAGNOSIS — Z12.39 BREAST CANCER SCREENING: ICD-10-CM

## 2018-08-09 PROCEDURE — 77063 BREAST TOMOSYNTHESIS BI: CPT

## 2018-08-31 ENCOUNTER — HOSPITAL ENCOUNTER (OUTPATIENT)
Dept: MAMMOGRAPHY | Age: 83
Discharge: HOME | End: 2018-08-31
Attending: FAMILY MEDICINE
Payer: MEDICARE

## 2018-08-31 ENCOUNTER — HOSPITAL ENCOUNTER (OUTPATIENT)
Dept: ULTRASOUND IMAGING | Age: 83
Discharge: HOME | End: 2018-08-31
Attending: FAMILY MEDICINE
Payer: MEDICARE

## 2018-08-31 DIAGNOSIS — R92.8 ABNORMAL MAMMOGRAM OF LEFT BREAST: ICD-10-CM

## 2018-08-31 PROCEDURE — 77061 BREAST TOMOSYNTHESIS UNI: CPT

## 2019-02-02 ENCOUNTER — HOSPITAL ENCOUNTER (EMERGENCY)
Age: 84
Discharge: HOME | End: 2019-02-02
Attending: EMERGENCY MEDICINE
Payer: MEDICARE

## 2019-02-02 ENCOUNTER — APPOINTMENT (OUTPATIENT)
Dept: GENERAL RADIOLOGY | Age: 84
End: 2019-02-02
Attending: EMERGENCY MEDICINE
Payer: MEDICARE

## 2019-02-02 VITALS
SYSTOLIC BLOOD PRESSURE: 191 MMHG | TEMPERATURE: 97.6 F | BODY MASS INDEX: 28.68 KG/M2 | OXYGEN SATURATION: 100 % | DIASTOLIC BLOOD PRESSURE: 78 MMHG | HEART RATE: 79 BPM | HEIGHT: 64 IN | WEIGHT: 168 LBS | RESPIRATION RATE: 20 BRPM

## 2019-02-02 DIAGNOSIS — I10 ELEVATED BLOOD PRESSURE READING WITH DIAGNOSIS OF HYPERTENSION: ICD-10-CM

## 2019-02-02 DIAGNOSIS — J20.9 ACUTE BRONCHITIS, UNSPECIFIED ORGANISM: Primary | ICD-10-CM

## 2019-02-02 PROCEDURE — 74011636637 HC RX REV CODE- 636/637: Performed by: EMERGENCY MEDICINE

## 2019-02-02 PROCEDURE — 94640 AIRWAY INHALATION TREATMENT: CPT

## 2019-02-02 PROCEDURE — 71046 X-RAY EXAM CHEST 2 VIEWS: CPT

## 2019-02-02 PROCEDURE — 74011000250 HC RX REV CODE- 250: Performed by: EMERGENCY MEDICINE

## 2019-02-02 PROCEDURE — 74011250637 HC RX REV CODE- 250/637: Performed by: EMERGENCY MEDICINE

## 2019-02-02 PROCEDURE — 99282 EMERGENCY DEPT VISIT SF MDM: CPT

## 2019-02-02 PROCEDURE — 77030029684 HC NEB SM VOL KT MONA -A

## 2019-02-02 RX ORDER — AZITHROMYCIN 250 MG/1
TABLET, FILM COATED ORAL
Qty: 6 TAB | Refills: 0 | Status: SHIPPED | OUTPATIENT
Start: 2019-02-02 | End: 2019-02-07

## 2019-02-02 RX ORDER — IPRATROPIUM BROMIDE AND ALBUTEROL SULFATE 2.5; .5 MG/3ML; MG/3ML
3 SOLUTION RESPIRATORY (INHALATION)
Status: COMPLETED | OUTPATIENT
Start: 2019-02-02 | End: 2019-02-02

## 2019-02-02 RX ORDER — CODEINE PHOSPHATE AND GUAIFENESIN 10; 100 MG/5ML; MG/5ML
5 SOLUTION ORAL
Status: COMPLETED | OUTPATIENT
Start: 2019-02-02 | End: 2019-02-02

## 2019-02-02 RX ORDER — PREDNISONE 20 MG/1
40 TABLET ORAL ONCE
Status: COMPLETED | OUTPATIENT
Start: 2019-02-02 | End: 2019-02-02

## 2019-02-02 RX ORDER — BENZONATATE 100 MG/1
100 CAPSULE ORAL
Qty: 30 CAP | Refills: 0 | Status: SHIPPED | OUTPATIENT
Start: 2019-02-02 | End: 2019-02-09

## 2019-02-02 RX ORDER — PREDNISONE 20 MG/1
40 TABLET ORAL DAILY
Qty: 6 TAB | Refills: 0 | Status: SHIPPED | OUTPATIENT
Start: 2019-02-03 | End: 2019-02-06

## 2019-02-02 RX ORDER — GUAIFENESIN 100 MG/5ML
81 LIQUID (ML) ORAL DAILY
COMMUNITY

## 2019-02-02 RX ORDER — ALBUTEROL SULFATE 0.83 MG/ML
2.5 SOLUTION RESPIRATORY (INHALATION)
Status: COMPLETED | OUTPATIENT
Start: 2019-02-02 | End: 2019-02-02

## 2019-02-02 RX ADMIN — GUAIFENESIN AND CODEINE PHOSPHATE 5 ML: 100; 10 SOLUTION ORAL at 13:39

## 2019-02-02 RX ADMIN — PREDNISONE 40 MG: 20 TABLET ORAL at 12:51

## 2019-02-02 RX ADMIN — IPRATROPIUM BROMIDE AND ALBUTEROL SULFATE 3 ML: .5; 3 SOLUTION RESPIRATORY (INHALATION) at 12:51

## 2019-02-02 RX ADMIN — ALBUTEROL SULFATE 2.5 MG: 2.5 SOLUTION RESPIRATORY (INHALATION) at 13:39

## 2019-02-02 NOTE — DISCHARGE INSTRUCTIONS
Patient Education        Bronchitis: Care Instructions  Your Care Instructions    Bronchitis is inflammation of the bronchial tubes, which carry air to the lungs. The tubes swell and produce mucus, or phlegm. The mucus and inflamed bronchial tubes make you cough. You may have trouble breathing. Most cases of bronchitis are caused by viruses like those that cause colds. Antibiotics usually do not help and they may be harmful. Bronchitis usually develops rapidly and lasts about 2 to 3 weeks in otherwise healthy people. Follow-up care is a key part of your treatment and safety. Be sure to make and go to all appointments, and call your doctor if you are having problems. It's also a good idea to know your test results and keep a list of the medicines you take. How can you care for yourself at home? · Take all medicines exactly as prescribed. Call your doctor if you think you are having a problem with your medicine. · Get some extra rest.  · Take an over-the-counter pain medicine, such as acetaminophen (Tylenol), ibuprofen (Advil, Motrin), or naproxen (Aleve) to reduce fever and relieve body aches. Read and follow all instructions on the label. · Do not take two or more pain medicines at the same time unless the doctor told you to. Many pain medicines have acetaminophen, which is Tylenol. Too much acetaminophen (Tylenol) can be harmful. · Take an over-the-counter cough medicine that contains dextromethorphan to help quiet a dry, hacking cough so that you can sleep. Avoid cough medicines that have more than one active ingredient. Read and follow all instructions on the label. · Breathe moist air from a humidifier, hot shower, or sink filled with hot water. The heat and moisture will thin mucus so you can cough it out. · Do not smoke. Smoking can make bronchitis worse. If you need help quitting, talk to your doctor about stop-smoking programs and medicines.  These can increase your chances of quitting for good.  When should you call for help? Call 911 anytime you think you may need emergency care. For example, call if:    · You have severe trouble breathing.    Call your doctor now or seek immediate medical care if:    · You have new or worse trouble breathing.     · You cough up dark brown or bloody mucus (sputum).     · You have a new or higher fever.     · You have a new rash.    Watch closely for changes in your health, and be sure to contact your doctor if:    · You cough more deeply or more often, especially if you notice more mucus or a change in the color of your mucus.     · You are not getting better as expected. Where can you learn more? Go to http://[employer]pia.info/. Enter H333 in the search box to learn more about \"Bronchitis: Care Instructions. \"  Current as of: September 5, 2018  Content Version: 11.9  © 3140-0396 [employer]. Care instructions adapted under license by [employer] (which disclaims liability or warranty for this information). If you have questions about a medical condition or this instruction, always ask your healthcare professional. [address] any warranty or liability for your use of this information. Patient Education        Learning About High Blood Pressure  What is high blood pressure? Blood pressure is a measure of how hard the blood pushes against the walls of your arteries. It's normal for blood pressure to go up and down throughout the day, but if it stays up, you have high blood pressure. Another name for high blood pressure is hypertension. Two numbers tell you your blood pressure. The first number is the systolic pressure. It shows how hard the blood pushes when your heart is pumping. The second number is the diastolic pressure. It shows how hard the blood pushes between heartbeats, when your heart is relaxed and filling with blood. Your doctor will give you a goal for your blood pressure. Your goal will be based on your health and your age. High blood pressure (hypertension) means that the top number stays high, or the bottom number stays high, or both. High blood pressure increases the risk of stroke, heart attack, and other problems. You and your doctor will talk about your risks of these problems based on your blood pressure. What happens when you have high blood pressure? · Blood flows through your arteries with too much force. Over time, this damages the walls of your arteries. But you can't feel it. High blood pressure usually doesn't cause symptoms. · Fat and calcium start to build up in your arteries. This buildup is called plaque. Plaque makes your arteries narrower and stiffer. Blood can't flow through them as easily. · This lack of good blood flow starts to damage some of the organs in your body. This can lead to problems such as coronary artery disease and heart attack, heart failure, stroke, kidney failure, and eye damage. How can you prevent high blood pressure? · Stay at a healthy weight. · Try to limit how much sodium you eat to less than 2,300 milligrams (mg) a day. If you limit your sodium to 1,500 mg a day, you can lower your blood pressure even more. ? Buy foods that are labeled \"unsalted,\" \"sodium-free,\" or \"low-sodium. \" Foods labeled \"reduced-sodium\" and \"light sodium\" may still have too much sodium. ? Flavor your food with garlic, lemon juice, onion, vinegar, herbs, and spices instead of salt. Do not use soy sauce, steak sauce, onion salt, garlic salt, mustard, or ketchup on your food. ? Use less salt (or none) when recipes call for it. You can often use half the salt a recipe calls for without losing flavor. · Be physically active. Get at least 30 minutes of exercise on most days of the week. Walking is a good choice. You also may want to do other activities, such as running, swimming, cycling, or playing tennis or team sports.   · Limit alcohol to 2 drinks a day for men and 1 drink a day for women. · Eat plenty of fruits, vegetables, and low-fat dairy products. Eat less saturated and total fats. How is high blood pressure treated? · Your doctor will suggest making lifestyle changes. For example, your doctor may ask you to eat healthy foods, quit smoking, lose extra weight, and be more active. · If lifestyle changes don't help enough, your doctor may recommend that you take medicine. · When blood pressure is very high, medicines are needed to lower it. Follow-up care is a key part of your treatment and safety. Be sure to make and go to all appointments, and call your doctor if you are having problems. It's also a good idea to know your test results and keep a list of the medicines you take. Where can you learn more? Go to http://ranjith-pia.info/. Enter P501 in the search box to learn more about \"Learning About High Blood Pressure. \"  Current as of: July 22, 2018  Content Version: 11.9  © 2008-7997 [employer]. Care instructions adapted under license by [employer] (which disclaims liability or warranty for this information). If you have questions about a medical condition or this instruction, always ask your healthcare professional. [address] any warranty or liability for your use of this information.

## 2019-02-02 NOTE — ED PROVIDER NOTES
EMERGENCY DEPARTMENT HISTORY AND PHYSICAL EXAM 
 
12:28 PM 
 
 
Date: 2/2/2019 Patient Name: [facility] History of Presenting Illness Chief Complaint Patient presents with  Cough  Chest Congestion History Provided By: Patient Chief Complaint: Cough; Congestion Duration:  Weeks Timing:  Constant Location: N/A Quality: Productive cough Severity: N/A Modifying Factors: Using her albuterol inhaler with no relief Associated Symptoms: denies any other associated signs or symptoms Additional History (Context): [facility] is a 80 y.o. female with PMHx of HTN, HLD, and DM presenting to the ED c/o constant productive cough and congestion for the past 2-3 weeks. States cough has been productive of \"white\" sputum but also noticed \"a little bit of pink\" in her sputum this morning. States she has been using her albuterol inhaler at home with no relief. Denies fever and any other symptoms or complaints. PCP: Flory Lei MD 
 
 
Past History Past Medical History: 
Past Medical History:  
Diagnosis Date  Bronchitis  Diabetes ([address]  Hyperlipemia  Hypertension Past Surgical History: 
Past Surgical History:  
Procedure Laterality Date  COLONOSCOPY N/A 6/7/2017 COLONOSCOPY / polypectomy performed by Miguelangel Reno MD at [address] HX CATARACT REMOVAL    
 HX COLONOSCOPY    
 2011 Family History: 
Family History Problem Relation Age of Onset  Heart Disease Mother  Diabetes Father  Breast Cancer Daughter Social History: 
Social History Tobacco Use  Smoking status: Never Smoker  Smokeless tobacco: Never Used Substance Use Topics  Alcohol use: No  
 Drug use: No  
 
 
Allergies: 
No Known Allergies Review of Systems Review of Systems Constitutional: Negative for chills and fever. HENT: Positive for congestion.  Negative for sore throat and trouble swallowing. Eyes: Negative for visual disturbance. Respiratory: Positive for cough. Negative for shortness of breath. Cardiovascular: Negative for chest pain. Gastrointestinal: Negative for abdominal pain, nausea and vomiting. Endocrine: Negative for polyuria. Genitourinary: Negative for dysuria. Musculoskeletal: Negative for arthralgias and neck stiffness. Skin: Negative for pallor and rash. Neurological: Negative for dizziness, weakness, numbness and headaches. Hematological: Does not bruise/bleed easily. Psychiatric/Behavioral: Negative for confusion and dysphoric mood. All other systems reviewed and are negative. Physical Exam  
 
Visit Vitals /78 (BP 1 Location: Left arm, BP Patient Position: Sitting) Pulse 79 Temp 97.6 °F (36.4 °C) Resp 20 Ht 5' 4\" (1.626 m) Wt 76.2 kg (168 lb) SpO2 100% BMI 28.84 kg/m² Physical Exam  
Constitutional: She is oriented to person, place, and time. She appears well-developed and well-nourished. No distress. HENT:  
Head: Normocephalic and atraumatic. Mouth/Throat: Oropharynx is clear and moist.  
Eyes: Conjunctivae are normal. Pupils are equal, round, and reactive to light. No scleral icterus. Neck: Normal range of motion. Neck supple. Cardiovascular: Normal rate and intact distal pulses. Capillary refill < 3 seconds Pulmonary/Chest: Effort normal. No respiratory distress. She has wheezes (scattered expiratory). Active cough. Oxygen 98% on room air. Abdominal: Soft. Bowel sounds are normal. She exhibits no distension. There is no tenderness. Musculoskeletal: Normal range of motion. She exhibits no edema. No lower extremity edema. Lymphadenopathy:  
  She has no cervical adenopathy. Neurological: She is alert and oriented to person, place, and time. No cranial nerve deficit. Skin: Skin is warm and dry. She is not diaphoretic. Nursing note and vitals reviewed. Diagnostic Study Results Labs - No results found for this or any previous visit (from the past 12 hour(s)). Radiologic Studies -  
XR CHEST PA LAT Final Result IMPRESSION:   
  
1. No acute cardiopulmonary process. No acute process. Preliminary read by Antonino Modi DO Medical Decision Making I am the first provider for this patient. I reviewed the vital signs, available nursing notes, past medical history, past surgical history, family history and social history. Vital Signs-Reviewed the patient's vital signs. Pulse Oximetry Analysis -  100% on room air, normal  
 
Records Reviewed: Nursing Notes and Old Medical Records (Time of Review: 12:28 PM) Provider Notes (Medical Decision Making): MDM Number of Diagnoses or Management Options Diagnosis management comments: DDx acute bronchitis, COPD, pneumonia. Speaking in full sentences. No fever or chills. Will get X-ray, give duo-neb. Medications  
albuterol-ipratropium (DUO-NEB) 2.5 MG-0.5 MG/3 ML (3 mL Nebulization Given 2/2/19 1251) predniSONE (DELTASONE) tablet 40 mg (40 mg Oral Given 2/2/19 1251)  
albuterol (PROVENTIL VENTOLIN) nebulizer solution 2.5 mg (2.5 mg Nebulization Given 2/2/19 1339) guaiFENesin-codeine (ROBITUSSIN AC) 100-10 mg/5 mL solution 5 mL (5 mL Oral Given 2/2/19 1339) ED Course: Progress Notes, Reevaluation, and Consults: 
1:39 PM Pt is feeling better after first treatment and steroids. States she is coughing up some more sputum. Will give her another nebulizer treatment and some cough medicine and discharge with follow up with PCP. I discussed her elevated blood pressure with pt and daughter at bedside. Pt is asymptomatic. Will get her to have repeat blood pressure checked this week. Pt admits that she has not been using her nebulizer at home as prescribed. States she sort of forgot about using that.  Does have solution at home. I encouraged her to use that as prescribed. Explained that pt's blood sugar may increase due to short course of steroids, pt and daughter will monitor this. Diagnosis Clinical Impression: 1. Acute bronchitis, unspecified organism 2. Elevated blood pressure reading with diagnosis of hypertension Disposition: Discharged Follow-up Information Follow up With Specialties Details Why Contact Info Jose Armando Kathleen MD General Practice Schedule an appointment as soon as possible for a visit in 2 days Get repeat blood pressure check [address] [facility] [address] [address] 
[phone] [id number] [facility] EMERGENCY DEPT Emergency Medicine  As needed, If symptoms worsen [address] [facility] [place] [id number] [phone] Medication List  
  
START taking these medications   
azithromycin 250 mg tablet Commonly known as:  Jim Kelsey Take as written 
  
benzonatate 100 mg capsule Commonly known as:  TESSALON PERLES Take 1 Cap by mouth three (3) times daily as needed for Cough for up to 7 days. predniSONE 20 mg tablet Commonly known as:  Albino Herrick Take 40 mg by mouth daily for 3 days. Start this medication on [place] 2/3/19. Take With Breakfast 
Start taking on:  2/3/2019 CONTINUE taking these medications NEBULIZER ASK your doctor about these medications ADVAIR DISKUS 100-50 mcg/dose diskus inhaler Generic drug:  fluticasone-salmeterol 
  
aspirin 81 mg chewable tablet CETIRIZINE PO 
  
cloNIDine HCl 0.2 mg tablet Commonly known as:  CATAPRES 
  
glimepiride 4 mg tablet Commonly known as:  AMARYL 
  
lisinopril 20 mg tablet Commonly known as:  PRINIVIL, ZESTRIL 
  
metoprolol tartrate 100 mg IR tablet Commonly known as:  LOPRESSOR 
  
pravastatin 40 mg tablet Commonly known as:  PRAVACHOL 
  
SINGULAIR PO 
  
verapamil  mg CR tablet Commonly known as:  CALAN-SR 
  
VITAMIN D3 1,000 unit Cap Generic drug:  cholecalciferol Where to Get Your Medications These medications were sent to [address] [facility], [address] [place] [address] Avenue  [address], [address] [address] Phone:  [phone]  
· azithromycin 250 mg tablet · benzonatate 100 mg capsule · predniSONE 20 mg tablet 
  
 
_______________________________ Attestations: 
Scribe Attestation Rojelio Conrad acting as a scribe for and in the presence of Venkata DRUMMOND DO February 02, 2019 at 12:28 PM 
    
Provider Attestation:     
I personally performed the services described in the documentation, reviewed the documentation, as recorded by the scribe in my presence, and it accurately and completely records my words and actions. February 02, 2019 at 12:28 PM - Adina Pacheco DO   
_______________________________

## 2019-06-30 ENCOUNTER — APPOINTMENT (OUTPATIENT)
Dept: GENERAL RADIOLOGY | Age: 84
DRG: 195 | End: 2019-06-30
Attending: EMERGENCY MEDICINE
Payer: MEDICARE

## 2019-06-30 ENCOUNTER — HOSPITAL ENCOUNTER (INPATIENT)
Age: 84
LOS: 3 days | Discharge: HOME HEALTH | DRG: 195 | End: 2019-07-03
Attending: EMERGENCY MEDICINE | Admitting: HOSPITALIST
Payer: MEDICARE

## 2019-06-30 DIAGNOSIS — J18.9 COMMUNITY ACQUIRED PNEUMONIA OF RIGHT LUNG, UNSPECIFIED PART OF LUNG: Primary | ICD-10-CM

## 2019-06-30 DIAGNOSIS — R09.02 HYPOXIA: ICD-10-CM

## 2019-06-30 PROBLEM — E11.9 CONTROLLED TYPE 2 DIABETES MELLITUS, WITHOUT LONG-TERM CURRENT USE OF INSULIN (HCC): Chronic | Status: ACTIVE | Noted: 2019-06-30

## 2019-06-30 PROBLEM — I10 ESSENTIAL HYPERTENSION: Chronic | Status: ACTIVE | Noted: 2019-06-30

## 2019-06-30 LAB
ALBUMIN SERPL-MCNC: 3.6 G/DL (ref 3.4–5)
ALBUMIN/GLOB SERPL: 0.8 {RATIO} (ref 0.8–1.7)
ALP SERPL-CCNC: 105 U/L (ref 45–117)
ALT SERPL-CCNC: 12 U/L (ref 13–56)
ANION GAP SERPL CALC-SCNC: 7 MMOL/L (ref 3–18)
AST SERPL-CCNC: 17 U/L (ref 15–37)
ATRIAL RATE: 58 BPM
BASOPHILS # BLD: 0 K/UL (ref 0–0.06)
BASOPHILS NFR BLD: 0 % (ref 0–3)
BILIRUB SERPL-MCNC: 0.4 MG/DL (ref 0.2–1)
BNP SERPL-MCNC: 192 PG/ML (ref 0–1800)
BUN SERPL-MCNC: 22 MG/DL (ref 7–18)
BUN/CREAT SERPL: 25 (ref 12–20)
CALCIUM SERPL-MCNC: 9.8 MG/DL (ref 8.5–10.1)
CALCULATED P AXIS, ECG09: 68 DEGREES
CALCULATED R AXIS, ECG10: 61 DEGREES
CALCULATED T AXIS, ECG11: 56 DEGREES
CHLORIDE SERPL-SCNC: 110 MMOL/L (ref 100–108)
CO2 SERPL-SCNC: 29 MMOL/L (ref 21–32)
CREAT SERPL-MCNC: 0.89 MG/DL (ref 0.6–1.3)
DIAGNOSIS, 93000: NORMAL
DIFFERENTIAL METHOD BLD: ABNORMAL
EOSINOPHIL # BLD: 2.2 K/UL (ref 0–0.4)
EOSINOPHIL NFR BLD: 23 % (ref 0–5)
ERYTHROCYTE [DISTWIDTH] IN BLOOD BY AUTOMATED COUNT: 13.6 % (ref 11.6–14.5)
GLOBULIN SER CALC-MCNC: 4.5 G/DL (ref 2–4)
GLUCOSE SERPL-MCNC: 67 MG/DL (ref 74–99)
HCT VFR BLD AUTO: 39.2 % (ref 35–45)
HGB BLD-MCNC: 12.9 G/DL (ref 12–16)
LACTATE BLD-SCNC: 0.55 MMOL/L (ref 0.4–2)
LYMPHOCYTES # BLD: 2.4 K/UL (ref 0.8–3.5)
LYMPHOCYTES NFR BLD: 25 % (ref 20–51)
MCH RBC QN AUTO: 27.3 PG (ref 24–34)
MCHC RBC AUTO-ENTMCNC: 32.9 G/DL (ref 31–37)
MCV RBC AUTO: 82.9 FL (ref 74–97)
MONOCYTES # BLD: 0.2 K/UL (ref 0–1)
MONOCYTES NFR BLD: 2 % (ref 2–9)
NEUTS SEG # BLD: 4.6 K/UL (ref 1.8–8)
NEUTS SEG NFR BLD: 50 % (ref 42–75)
P-R INTERVAL, ECG05: 204 MS
PLATELET # BLD AUTO: 232 K/UL (ref 135–420)
PLATELET COMMENTS,PCOM: ABNORMAL
PMV BLD AUTO: 10.1 FL (ref 9.2–11.8)
POTASSIUM SERPL-SCNC: 3.8 MMOL/L (ref 3.5–5.5)
PROT SERPL-MCNC: 8.1 G/DL (ref 6.4–8.2)
Q-T INTERVAL, ECG07: 406 MS
QRS DURATION, ECG06: 74 MS
QTC CALCULATION (BEZET), ECG08: 398 MS
RBC # BLD AUTO: 4.73 M/UL (ref 4.2–5.3)
RBC MORPH BLD: ABNORMAL
SODIUM SERPL-SCNC: 146 MMOL/L (ref 136–145)
TROPONIN I SERPL-MCNC: <0.02 NG/ML (ref 0–0.04)
VENTRICULAR RATE, ECG03: 58 BPM
WBC # BLD AUTO: 9.4 K/UL (ref 4.6–13.2)

## 2019-06-30 PROCEDURE — 77030029684 HC NEB SM VOL KT MONA -A

## 2019-06-30 PROCEDURE — 85025 COMPLETE CBC W/AUTO DIFF WBC: CPT

## 2019-06-30 PROCEDURE — 94640 AIRWAY INHALATION TREATMENT: CPT

## 2019-06-30 PROCEDURE — 71046 X-RAY EXAM CHEST 2 VIEWS: CPT

## 2019-06-30 PROCEDURE — 87040 BLOOD CULTURE FOR BACTERIA: CPT

## 2019-06-30 PROCEDURE — 87077 CULTURE AEROBIC IDENTIFY: CPT

## 2019-06-30 PROCEDURE — 87186 SC STD MICRODIL/AGAR DIL: CPT

## 2019-06-30 PROCEDURE — 80053 COMPREHEN METABOLIC PANEL: CPT

## 2019-06-30 PROCEDURE — 99284 EMERGENCY DEPT VISIT MOD MDM: CPT

## 2019-06-30 PROCEDURE — 83605 ASSAY OF LACTIC ACID: CPT

## 2019-06-30 PROCEDURE — 96375 TX/PRO/DX INJ NEW DRUG ADDON: CPT

## 2019-06-30 PROCEDURE — 93005 ELECTROCARDIOGRAM TRACING: CPT

## 2019-06-30 PROCEDURE — 74011000250 HC RX REV CODE- 250: Performed by: EMERGENCY MEDICINE

## 2019-06-30 PROCEDURE — 74011250636 HC RX REV CODE- 250/636: Performed by: EMERGENCY MEDICINE

## 2019-06-30 PROCEDURE — 83880 ASSAY OF NATRIURETIC PEPTIDE: CPT

## 2019-06-30 PROCEDURE — 96365 THER/PROPH/DIAG IV INF INIT: CPT

## 2019-06-30 PROCEDURE — 74011250637 HC RX REV CODE- 250/637: Performed by: HOSPITALIST

## 2019-06-30 PROCEDURE — 65660000000 HC RM CCU STEPDOWN

## 2019-06-30 PROCEDURE — 84484 ASSAY OF TROPONIN QUANT: CPT

## 2019-06-30 RX ORDER — IPRATROPIUM BROMIDE AND ALBUTEROL SULFATE 2.5; .5 MG/3ML; MG/3ML
3 SOLUTION RESPIRATORY (INHALATION) ONCE
Status: COMPLETED | OUTPATIENT
Start: 2019-06-30 | End: 2019-06-30

## 2019-06-30 RX ORDER — ALBUTEROL SULFATE 0.83 MG/ML
2.5 SOLUTION RESPIRATORY (INHALATION)
Status: DISCONTINUED | OUTPATIENT
Start: 2019-07-01 | End: 2019-07-02

## 2019-06-30 RX ORDER — CLONIDINE HYDROCHLORIDE 0.1 MG/1
0.2 TABLET ORAL 2 TIMES DAILY
Status: DISCONTINUED | OUTPATIENT
Start: 2019-06-30 | End: 2019-07-01

## 2019-06-30 RX ORDER — GUAIFENESIN 600 MG/1
1200 TABLET, EXTENDED RELEASE ORAL EVERY 12 HOURS
Status: DISCONTINUED | OUTPATIENT
Start: 2019-06-30 | End: 2019-07-03 | Stop reason: HOSPADM

## 2019-06-30 RX ORDER — GUAIFENESIN 100 MG/5ML
81 LIQUID (ML) ORAL DAILY
Status: DISCONTINUED | OUTPATIENT
Start: 2019-07-01 | End: 2019-07-03 | Stop reason: HOSPADM

## 2019-06-30 RX ORDER — LISINOPRIL 10 MG/1
40 TABLET ORAL DAILY
Status: DISCONTINUED | OUTPATIENT
Start: 2019-07-01 | End: 2019-07-03 | Stop reason: HOSPADM

## 2019-06-30 RX ORDER — ALBUTEROL SULFATE 0.83 MG/ML
5 SOLUTION RESPIRATORY (INHALATION)
Status: COMPLETED | OUTPATIENT
Start: 2019-06-30 | End: 2019-06-30

## 2019-06-30 RX ORDER — LORATADINE 10 MG/1
10 TABLET ORAL
Status: DISCONTINUED | OUTPATIENT
Start: 2019-06-30 | End: 2019-07-03 | Stop reason: HOSPADM

## 2019-06-30 RX ORDER — VERAPAMIL HYDROCHLORIDE 180 MG/1
180 TABLET, EXTENDED RELEASE ORAL DAILY
Status: DISCONTINUED | OUTPATIENT
Start: 2019-07-01 | End: 2019-07-01

## 2019-06-30 RX ORDER — METOPROLOL TARTRATE 50 MG/1
200 TABLET ORAL DAILY
Status: DISCONTINUED | OUTPATIENT
Start: 2019-07-01 | End: 2019-07-03 | Stop reason: HOSPADM

## 2019-06-30 RX ORDER — CODEINE PHOSPHATE AND GUAIFENESIN 10; 100 MG/5ML; MG/5ML
10 SOLUTION ORAL
Status: DISCONTINUED | OUTPATIENT
Start: 2019-06-30 | End: 2019-07-03 | Stop reason: HOSPADM

## 2019-06-30 RX ORDER — PRAVASTATIN SODIUM 20 MG/1
40 TABLET ORAL DAILY
Status: DISCONTINUED | OUTPATIENT
Start: 2019-07-01 | End: 2019-07-03 | Stop reason: HOSPADM

## 2019-06-30 RX ORDER — MONTELUKAST SODIUM 10 MG/1
10 TABLET ORAL
Status: DISCONTINUED | OUTPATIENT
Start: 2019-06-30 | End: 2019-07-03 | Stop reason: HOSPADM

## 2019-06-30 RX ADMIN — IPRATROPIUM BROMIDE AND ALBUTEROL SULFATE 3 ML: .5; 3 SOLUTION RESPIRATORY (INHALATION) at 11:45

## 2019-06-30 RX ADMIN — CEFTRIAXONE SODIUM 2 G: 2 INJECTION, POWDER, FOR SOLUTION INTRAMUSCULAR; INTRAVENOUS at 12:29

## 2019-06-30 RX ADMIN — AZITHROMYCIN MONOHYDRATE 500 MG: 500 INJECTION, POWDER, LYOPHILIZED, FOR SOLUTION INTRAVENOUS at 12:29

## 2019-06-30 RX ADMIN — MONTELUKAST 10 MG: 10 TABLET, FILM COATED ORAL at 23:33

## 2019-06-30 RX ADMIN — CLONIDINE HYDROCHLORIDE 0.2 MG: 0.1 TABLET ORAL at 23:33

## 2019-06-30 RX ADMIN — ALBUTEROL SULFATE 5 MG: 2.5 SOLUTION RESPIRATORY (INHALATION) at 12:00

## 2019-06-30 RX ADMIN — LORATADINE 10 MG: 10 TABLET ORAL at 23:33

## 2019-06-30 RX ADMIN — GUAIFENESIN 1200 MG: 600 TABLET, EXTENDED RELEASE ORAL at 23:33

## 2019-06-30 NOTE — ED NOTES
Patient coughing up clear colored sputum. Rinsed mouth with water. Expiratory wheezing throughout lung fields.

## 2019-06-30 NOTE — ED TRIAGE NOTES
C/o cough x 3 days. Patient wheezing. She states having nebulizer machine at home, but denies using it.

## 2019-06-30 NOTE — PROGRESS NOTES
Received call from [address] [facility] for patient admission to telemetry. Full sign out of clinical status, recent history. Patient to be admitted telemetry RML pneumonia.   Sid Rubi DO

## 2019-06-30 NOTE — ED PROVIDER NOTES
EMERGENCY DEPARTMENT HISTORY AND PHYSICAL EXAM    Date: 6/30/2019  Patient Name: [facility] (formerly [facility]3]    History of Presenting Illness     Chief Complaint   Patient presents with    Cough         History Provided By: Patient      Additional History (Context): [facility] ([facility]3] is a 80 y.o. female with diabetes, hypertension, hyperlipidemia, obesity and Bronchitis who presents with rhinorrhea, cough, congestion and shortness of breath which is worse with exertion for the past 3 days. Denies fever. Says she gets short of breath now walking 1 block to go to [place]. Denies chest pain. Says it is also more uncomfortable for her with her symptoms when she is lying flat and prefers to lie sleeping now sitting up. PCP: Jesica Benitez MD    Current Facility-Administered Medications   Medication Dose Route Frequency Provider Last Rate Last Dose    cefTRIAXone (ROCEPHIN) 2 g in sterile water (preservative free) 20 mL IV syringe  2 g IntraVENous Q24H Effie Zeng PA   2 g at 06/30/19 1229    azithromycin (ZITHROMAX) 500 mg in  mL  500 mg IntraVENous Q24H Effie Zeng PA   500 mg at 06/30/19 1229     Current Outpatient Medications   Medication Sig Dispense Refill    aspirin 81 mg chewable tablet Take 81 mg by mouth daily.  CETIRIZINE HCL (CETIRIZINE PO) Take  by mouth.  MONTELUKAST SODIUM (SINGULAIR PO) Take  by mouth.  fluticasone-salmeterol (ADVAIR DISKUS) 100-50 mcg/dose diskus inhaler Take 1 Puff by inhalation every twelve (12) hours.  NEBULIZER by Does Not Apply route.  Cholecalciferol, Vitamin D3, (VITAMIN D3) 1,000 unit cap Take  by mouth.  cloNIDine (CATAPRESS) 0.2 mg tablet Take 0.2 mg by mouth two (2) times a day.  glimepiride (AMARYL) 4 mg tablet Take 4 mg by mouth daily.  lisinopril (PRINIVIL, ZESTRIL) 20 mg tablet Take 40 mg by mouth daily.  metoprolol (LOPRESSOR) 100 mg tablet Take 200 mg by mouth daily.       pravastatin (PRAVACHOL) 40 mg tablet Take 40 mg by mouth daily.  verapamil SR (CALAN-SR) 180 mg CR tablet Take 180 mg by mouth daily. Past History     Past Medical History:  Past Medical History:   Diagnosis Date    Bronchitis     Diabetes ([address]     Hyperlipemia     Hypertension        Past Surgical History:  Past Surgical History:   Procedure Laterality Date    COLONOSCOPY N/A 6/7/2017    COLONOSCOPY / [facility]pect[facility] Shadi Cunningham MD at [facility] ENDOSCOPY    HX CATARACT REMOVAL      HX COLONOSCOPY      2011       Family History:  Family History   Problem Relation Age of Onset    Heart Disease Mother     Diabetes Father     Breast Cancer Daughter        Social History:  Social History     Tobacco Use    Smoking status: Never Smoker    Smokeless tobacco: Never Used   Substance Use Topics    Alcohol use: No    Drug use: No       Allergies:  No Known Allergies      Review of Systems   Review of Systems   Constitutional: Negative for fever. Respiratory: Positive for cough, shortness of breath and wheezing. Gastrointestinal: Negative for abdominal pain, constipation, nausea and vomiting. All Other Systems Negative  Physical Exam     Vitals:    06/30/19 1104 06/30/19 1200 06/30/19 1230 06/30/19 1322   BP: 157/79 (!) 143/110 131/55 162/73   Pulse: 71 61 (!) 58 70   Resp: 22 13 22 21   Temp: 98.2 °F (36.8 °C)      SpO2: 95% 95% 92% 95%   Weight: 81.6 kg (180 lb)      Height: 5' 4\" (1.626 m)        Physical Exam   Constitutional: She is oriented to person, place, and time. She appears well-developed and well-nourished. HENT:   Head: Normocephalic and atraumatic. Eyes: Pupils are equal, round, and reactive to light. Neck: No JVD present. No tracheal deviation present. No thyromegaly present. Cardiovascular: Normal rate, regular rhythm and normal heart sounds. Exam reveals no gallop and no friction rub. No murmur heard. Pulmonary/Chest: Effort normal. No stridor.  No respiratory distress. She has wheezes. She has no rales. She exhibits no tenderness. Late inspiratory and expiratory wheezing throughout. Abdominal: Soft. She exhibits no distension and no mass. There is no tenderness. There is no rebound and no guarding. Musculoskeletal: She exhibits edema. She exhibits no tenderness. Mild bilateral lower extremity edema. Lymphadenopathy:     She has no cervical adenopathy. Neurological: She is alert and oriented to person, place, and time. Skin: Skin is warm and dry. No rash noted. No erythema. No pallor. Psychiatric: She has a normal mood and affect. Her behavior is normal. Thought content normal.   Nursing note and vitals reviewed. Diagnostic Study Results     Labs -     Recent Results (from the past 12 hour(s))   EKG, 12 LEAD, INITIAL    Collection Time: 06/30/19 11:26 AM   Result Value Ref Range    Ventricular Rate 58 BPM    Atrial Rate 58 BPM    P-R Interval 204 ms    QRS Duration 74 ms    Q-T Interval 406 ms    QTC Calculation (Bezet) 398 ms    Calculated P Axis 68 degrees    Calculated R Axis 61 degrees    Calculated T Axis 56 degrees    Diagnosis       Sinus bradycardia  Otherwise normal ECG  When compared with ECG of 22-AUG-2015 19:54,  Vent. rate has decreased BY  32 BPM  QT has shortened     CBC WITH AUTOMATED DIFF    Collection Time: 06/30/19 11:31 AM   Result Value Ref Range    WBC 9.4 4.6 - 13.2 K/uL    RBC 4.73 4.20 - 5.30 M/uL    HGB 12.9 12.0 - 16.0 g/dL    HCT 39.2 35.0 - 45.0 %    MCV 82.9 74.0 - 97.0 FL    MCH 27.3 24.0 - 34.0 PG    MCHC 32.9 31.0 - 37.0 g/dL    RDW 13.6 11.6 - 14.5 %    PLATELET 069 810 - 809 K/uL    MPV 10.1 9.2 - 11.8 FL    NEUTROPHILS 50 42 - 75 %    LYMPHOCYTES 25 20 - 51 %    MONOCYTES 2 2 - 9 %    EOSINOPHILS 23 (H) 0 - 5 %    BASOPHILS 0 0 - 3 %    ABS. NEUTROPHILS 4.6 1.8 - 8.0 K/UL    ABS. LYMPHOCYTES 2.4 0.8 - 3.5 K/UL    ABS. MONOCYTES 0.2 0 - 1.0 K/UL    ABS. EOSINOPHILS 2.2 (H) 0.0 - 0.4 K/UL    ABS. BASOPHILS 0.0 0.0 - 0.06 K/UL    DF MANUAL      PLATELET COMMENTS ADEQUATE PLATELETS      RBC COMMENTS NORMOCYTIC, NORMOCHROMIC     METABOLIC PANEL, COMPREHENSIVE    Collection Time: 06/30/19 11:31 AM   Result Value Ref Range    Sodium 146 (H) 136 - 145 mmol/L    Potassium 3.8 3.5 - 5.5 mmol/L    Chloride 110 (H) 100 - 108 mmol/L    CO2 29 21 - 32 mmol/L    Anion gap 7 3.0 - 18 mmol/L    Glucose 67 (L) 74 - 99 mg/dL    BUN 22 (H) 7.0 - 18 MG/DL    Creatinine 0.89 0.6 - 1.3 MG/DL    BUN/Creatinine ratio 25 (H) 12 - 20      GFR est AA >60 >60 ml/min/1.73m2    GFR est non-AA >60 >60 ml/min/1.73m2    Calcium 9.8 8.5 - 10.1 MG/DL    Bilirubin, total 0.4 0.2 - 1.0 MG/DL    ALT (SGPT) 12 (L) 13 - 56 U/L    AST (SGOT) 17 15 - 37 U/L    Alk. phosphatase 105 45 - 117 U/L    Protein, total 8.1 6.4 - 8.2 g/dL    Albumin 3.6 3.4 - 5.0 g/dL    Globulin 4.5 (H) 2.0 - 4.0 g/dL    A-G Ratio 0.8 0.8 - 1.7     TROPONIN I    Collection Time: 06/30/19 11:31 AM   Result Value Ref Range    Troponin-I, QT <0.02 0.0 - 0.045 NG/ML   NT-PRO BNP    Collection Time: 06/30/19 11:31 AM   Result Value Ref Range    NT pro- 0 - 1,800 PG/ML   POC LACTIC ACID    Collection Time: 06/30/19 12:28 PM   Result Value Ref Range    Lactic Acid (POC) 0.55 0.40 - 2.00 mmol/L       Radiologic Studies -   XR CHEST PA LAT   Final Result   IMPRESSION:           Newly developed atelectatic right middle lobe. CT Results  (Last 48 hours)    None        CXR Results  (Last 48 hours)               06/30/19 1137  XR CHEST PA LAT Final result    Impression:  IMPRESSION:            Newly developed atelectatic right middle lobe. Narrative:  CHEST PA AND LATERAL:       INDICATIONS: Cough and wheezing       COMPARISONS: 2/2/2019       FINDINGS:        Lungs: Newly developed right middle lobe atelectasis. Cardiac silhouette and mediastinal contours: Normal.       Pleural spaces: No pneumothorax or pleural effusion evident. Bones and soft tissues: Chronic lower thoracic compression deformity. Support lines/catheters: None. Medical Decision Making   I am the first provider for this patient. I reviewed the vital signs, available nursing notes, past medical history, past surgical history, family history and social history. Vital Signs-Reviewed the patient's vital signs. Records Reviewed: Nursing Notes    Procedures:  Procedures    Provider Notes (Medical Decision Making): Ambulated patient and she dropped her oxygen saturations down to consistently around 90% bringing her self up only with ambulation to at max 92%. Placed on oxygen. Have spoken with Dr. Swathi Nelson about getting patient admitted to the service for community-acquired pneumonia as chest x-ray shows large consolidation on the right. Patient says she has not been hospitalized since she gave birth to her children 48 years ago. Given IV antibiotics here; lactic acid within normal range. Not septic. MED RECONCILIATION:  Current Facility-Administered Medications   Medication Dose Route Frequency    cefTRIAXone (ROCEPHIN) 2 g in sterile water (preservative free) 20 mL IV syringe  2 g IntraVENous Q24H    azithromycin (ZITHROMAX) 500 mg in  mL  500 mg IntraVENous Q24H     Current Outpatient Medications   Medication Sig    aspirin 81 mg chewable tablet Take 81 mg by mouth daily.  CETIRIZINE HCL (CETIRIZINE PO) Take  by mouth.  MONTELUKAST SODIUM (SINGULAIR PO) Take  by mouth.  fluticasone-salmeterol (ADVAIR DISKUS) 100-50 mcg/dose diskus inhaler Take 1 Puff by inhalation every twelve (12) hours.  NEBULIZER by Does Not Apply route.  Cholecalciferol, Vitamin D3, (VITAMIN D3) 1,000 unit cap Take  by mouth.  cloNIDine (CATAPRESS) 0.2 mg tablet Take 0.2 mg by mouth two (2) times a day.  glimepiride (AMARYL) 4 mg tablet Take 4 mg by mouth daily.       lisinopril (PRINIVIL, ZESTRIL) 20 mg tablet Take 40 mg by mouth daily.    metoprolol (LOPRESSOR) 100 mg tablet Take 200 mg by mouth daily.  pravastatin (PRAVACHOL) 40 mg tablet Take 40 mg by mouth daily.  verapamil SR (CALAN-SR) 180 mg CR tablet Take 180 mg by mouth daily. Disposition:  admit    Follow-up Information    None         Current Discharge Medication List            Core Measures:    Critical Care Time:   Critical Care Time:   I have spent 35 minutes of critical care time involved in lab review, consultations with specialist, family decision-making, and documentation. During this entire length of time I was immediately available to the patient.     Critical Care: The reason for providing this level of medical care for this critically ill patient was due a critical illness that impaired one or more vital organ systems such that there was a high probability of imminent or life threatening deterioration in the patients condition. This care involved high complexity decision making to assess, manipulate, and support vital system functions, to treat this degreee vital organ system failure and to prevent further life threatening deterioration of the patients condition. Diagnosis     Clinical Impression:   1. Community acquired pneumonia of right lung, unspecified part of lung    2.  Hypoxia

## 2019-06-30 NOTE — ROUTINE PROCESS
Bedside shift change report given to LAURA Stuart (oncoming nurse) by Catherine Sandoval RN (offgoing nurse). Report included the following information SBAR, Kardex, Intake/Output, Recent Results and Cardiac Rhythm NSR.

## 2019-07-01 LAB
ANION GAP SERPL CALC-SCNC: 7 MMOL/L (ref 3–18)
BASOPHILS # BLD: 0 K/UL (ref 0–0.1)
BASOPHILS NFR BLD: 0 % (ref 0–2)
BUN SERPL-MCNC: 16 MG/DL (ref 7–18)
BUN/CREAT SERPL: 15 (ref 12–20)
CALCIUM SERPL-MCNC: 9.5 MG/DL (ref 8.5–10.1)
CHLORIDE SERPL-SCNC: 106 MMOL/L (ref 100–108)
CK MB CFR SERPL CALC: 1.3 % (ref 0–4)
CK MB CFR SERPL CALC: 1.5 % (ref 0–4)
CK MB SERPL-MCNC: 1.3 NG/ML (ref 5–25)
CK MB SERPL-MCNC: 1.4 NG/ML (ref 5–25)
CK SERPL-CCNC: 94 U/L (ref 26–192)
CK SERPL-CCNC: 98 U/L (ref 26–192)
CO2 SERPL-SCNC: 29 MMOL/L (ref 21–32)
CREAT SERPL-MCNC: 1.07 MG/DL (ref 0.6–1.3)
DIFFERENTIAL METHOD BLD: ABNORMAL
EOSINOPHIL # BLD: 1.3 K/UL (ref 0–0.4)
EOSINOPHIL NFR BLD: 12 % (ref 0–5)
ERYTHROCYTE [DISTWIDTH] IN BLOOD BY AUTOMATED COUNT: 13.6 % (ref 11.6–14.5)
EST. AVERAGE GLUCOSE BLD GHB EST-MCNC: 131 MG/DL
GLUCOSE BLD STRIP.AUTO-MCNC: 103 MG/DL (ref 70–110)
GLUCOSE SERPL-MCNC: 194 MG/DL (ref 74–99)
HBA1C MFR BLD: 6.2 % (ref 4.2–5.6)
HCT VFR BLD AUTO: 40.6 % (ref 35–45)
HGB BLD-MCNC: 13.5 G/DL (ref 12–16)
LYMPHOCYTES # BLD: 2.3 K/UL (ref 0.9–3.6)
LYMPHOCYTES NFR BLD: 22 % (ref 21–52)
MAGNESIUM SERPL-MCNC: 1.6 MG/DL (ref 1.6–2.6)
MCH RBC QN AUTO: 27.1 PG (ref 24–34)
MCHC RBC AUTO-ENTMCNC: 33.3 G/DL (ref 31–37)
MCV RBC AUTO: 81.5 FL (ref 74–97)
MONOCYTES # BLD: 0.5 K/UL (ref 0.05–1.2)
MONOCYTES NFR BLD: 4 % (ref 3–10)
NEUTS SEG # BLD: 6.5 K/UL (ref 1.8–8)
NEUTS SEG NFR BLD: 62 % (ref 40–73)
PHOSPHATE SERPL-MCNC: 3.3 MG/DL (ref 2.5–4.9)
PLATELET # BLD AUTO: 244 K/UL (ref 135–420)
PMV BLD AUTO: 10.2 FL (ref 9.2–11.8)
POTASSIUM SERPL-SCNC: 3.5 MMOL/L (ref 3.5–5.5)
RBC # BLD AUTO: 4.98 M/UL (ref 4.2–5.3)
SODIUM SERPL-SCNC: 142 MMOL/L (ref 136–145)
TROPONIN I SERPL-MCNC: <0.02 NG/ML (ref 0–0.04)
TROPONIN I SERPL-MCNC: <0.02 NG/ML (ref 0–0.04)
WBC # BLD AUTO: 10.6 K/UL (ref 4.6–13.2)

## 2019-07-01 PROCEDURE — 74011250637 HC RX REV CODE- 250/637: Performed by: INTERNAL MEDICINE

## 2019-07-01 PROCEDURE — 82962 GLUCOSE BLOOD TEST: CPT

## 2019-07-01 PROCEDURE — 83036 HEMOGLOBIN GLYCOSYLATED A1C: CPT

## 2019-07-01 PROCEDURE — 74011250637 HC RX REV CODE- 250/637: Performed by: HOSPITALIST

## 2019-07-01 PROCEDURE — 74011250636 HC RX REV CODE- 250/636: Performed by: NURSE PRACTITIONER

## 2019-07-01 PROCEDURE — 80048 BASIC METABOLIC PNL TOTAL CA: CPT

## 2019-07-01 PROCEDURE — 74011000250 HC RX REV CODE- 250: Performed by: HOSPITALIST

## 2019-07-01 PROCEDURE — 94640 AIRWAY INHALATION TREATMENT: CPT

## 2019-07-01 PROCEDURE — 36415 COLL VENOUS BLD VENIPUNCTURE: CPT

## 2019-07-01 PROCEDURE — 82550 ASSAY OF CK (CPK): CPT

## 2019-07-01 PROCEDURE — 83735 ASSAY OF MAGNESIUM: CPT

## 2019-07-01 PROCEDURE — 84100 ASSAY OF PHOSPHORUS: CPT

## 2019-07-01 PROCEDURE — 74011000250 HC RX REV CODE- 250: Performed by: INTERNAL MEDICINE

## 2019-07-01 PROCEDURE — 65660000000 HC RM CCU STEPDOWN

## 2019-07-01 PROCEDURE — 74011250636 HC RX REV CODE- 250/636: Performed by: HOSPITALIST

## 2019-07-01 PROCEDURE — 94761 N-INVAS EAR/PLS OXIMETRY MLT: CPT

## 2019-07-01 PROCEDURE — 74011250636 HC RX REV CODE- 250/636: Performed by: EMERGENCY MEDICINE

## 2019-07-01 PROCEDURE — 85025 COMPLETE CBC W/AUTO DIFF WBC: CPT

## 2019-07-01 PROCEDURE — 77010033678 HC OXYGEN DAILY

## 2019-07-01 PROCEDURE — 74011000250 HC RX REV CODE- 250: Performed by: EMERGENCY MEDICINE

## 2019-07-01 RX ORDER — HEPARIN SODIUM 5000 [USP'U]/ML
5000 INJECTION, SOLUTION INTRAVENOUS; SUBCUTANEOUS EVERY 8 HOURS
Status: DISCONTINUED | OUTPATIENT
Start: 2019-07-01 | End: 2019-07-03 | Stop reason: HOSPADM

## 2019-07-01 RX ORDER — DEXTROSE MONOHYDRATE 100 MG/ML
125-250 INJECTION, SOLUTION INTRAVENOUS AS NEEDED
Status: DISCONTINUED | OUTPATIENT
Start: 2019-07-01 | End: 2019-07-03 | Stop reason: HOSPADM

## 2019-07-01 RX ORDER — MAGNESIUM SULFATE 100 %
4 CRYSTALS MISCELLANEOUS AS NEEDED
Status: DISCONTINUED | OUTPATIENT
Start: 2019-07-01 | End: 2019-07-03 | Stop reason: HOSPADM

## 2019-07-01 RX ORDER — DEXTROSE 50 % IN WATER (D50W) INTRAVENOUS SYRINGE
25-50 AS NEEDED
Status: DISCONTINUED | OUTPATIENT
Start: 2019-07-01 | End: 2019-07-01 | Stop reason: CLARIF

## 2019-07-01 RX ORDER — FAMOTIDINE 20 MG/1
20 TABLET, FILM COATED ORAL DAILY
Status: DISCONTINUED | OUTPATIENT
Start: 2019-07-01 | End: 2019-07-03 | Stop reason: HOSPADM

## 2019-07-01 RX ORDER — VERAPAMIL HYDROCHLORIDE 180 MG/1
180 TABLET, EXTENDED RELEASE ORAL DAILY
Status: DISCONTINUED | OUTPATIENT
Start: 2019-07-02 | End: 2019-07-03 | Stop reason: HOSPADM

## 2019-07-01 RX ORDER — BUDESONIDE 0.5 MG/2ML
500 INHALANT ORAL
Status: DISCONTINUED | OUTPATIENT
Start: 2019-07-01 | End: 2019-07-03 | Stop reason: HOSPADM

## 2019-07-01 RX ORDER — INSULIN LISPRO 100 [IU]/ML
INJECTION, SOLUTION INTRAVENOUS; SUBCUTANEOUS
Status: DISCONTINUED | OUTPATIENT
Start: 2019-07-01 | End: 2019-07-03 | Stop reason: HOSPADM

## 2019-07-01 RX ADMIN — LISINOPRIL 40 MG: 10 TABLET ORAL at 09:43

## 2019-07-01 RX ADMIN — HEPARIN SODIUM 5000 UNITS: 5000 INJECTION INTRAVENOUS; SUBCUTANEOUS at 13:54

## 2019-07-01 RX ADMIN — HEPARIN SODIUM 5000 UNITS: 5000 INJECTION INTRAVENOUS; SUBCUTANEOUS at 22:06

## 2019-07-01 RX ADMIN — BUDESONIDE 500 MCG: 0.5 INHALANT RESPIRATORY (INHALATION) at 15:51

## 2019-07-01 RX ADMIN — ALBUTEROL SULFATE 2.5 MG: 2.5 SOLUTION RESPIRATORY (INHALATION) at 07:10

## 2019-07-01 RX ADMIN — BUDESONIDE 500 MCG: 0.5 INHALANT RESPIRATORY (INHALATION) at 20:12

## 2019-07-01 RX ADMIN — CEFTRIAXONE SODIUM 2 G: 2 INJECTION, POWDER, FOR SOLUTION INTRAMUSCULAR; INTRAVENOUS at 13:55

## 2019-07-01 RX ADMIN — METOPROLOL TARTRATE 200 MG: 50 TABLET ORAL at 09:44

## 2019-07-01 RX ADMIN — FAMOTIDINE 20 MG: 20 TABLET, FILM COATED ORAL at 13:59

## 2019-07-01 RX ADMIN — ALBUTEROL SULFATE 2.5 MG: 2.5 SOLUTION RESPIRATORY (INHALATION) at 12:45

## 2019-07-01 RX ADMIN — AZITHROMYCIN MONOHYDRATE 500 MG: 500 INJECTION, POWDER, LYOPHILIZED, FOR SOLUTION INTRAVENOUS at 13:52

## 2019-07-01 RX ADMIN — GUAIFENESIN 1200 MG: 600 TABLET, EXTENDED RELEASE ORAL at 09:43

## 2019-07-01 RX ADMIN — ALBUTEROL SULFATE 2.5 MG: 2.5 SOLUTION RESPIRATORY (INHALATION) at 00:07

## 2019-07-01 RX ADMIN — GUAIFENESIN 1200 MG: 600 TABLET, EXTENDED RELEASE ORAL at 22:06

## 2019-07-01 RX ADMIN — PRAVASTATIN SODIUM 40 MG: 20 TABLET ORAL at 09:44

## 2019-07-01 RX ADMIN — MONTELUKAST 10 MG: 10 TABLET, FILM COATED ORAL at 22:06

## 2019-07-01 RX ADMIN — ALBUTEROL SULFATE 2.5 MG: 2.5 SOLUTION RESPIRATORY (INHALATION) at 15:51

## 2019-07-01 RX ADMIN — ASPIRIN 81 MG 81 MG: 81 TABLET ORAL at 09:43

## 2019-07-01 RX ADMIN — ALBUTEROL SULFATE 2.5 MG: 2.5 SOLUTION RESPIRATORY (INHALATION) at 20:12

## 2019-07-01 RX ADMIN — LORATADINE 10 MG: 10 TABLET ORAL at 22:06

## 2019-07-01 RX ADMIN — ALBUTEROL SULFATE 2.5 MG: 2.5 SOLUTION RESPIRATORY (INHALATION) at 04:19

## 2019-07-01 NOTE — H&P
History & Physical    Patient: Jessica Solo MRN: [id number]  CSN: [id number]    YOB: 1934  Age: 80 y.o. Sex: female      DOA: 6/30/2019       HPI:     Jessica Solo is a 80 y.o. female with a history of diabetes, hypertension, and hyperlipidemia. She has not been hospitalized since she gave childbirth 53 years ago. Patient developed increased cough and shortness of breath the past 3 days and today started wheezing and came to the ER at [facility]. In the ED Ms. Olivia Jarvis was found to have an increased oxygen requirement, wheezing and shortness of breath. Chest film with new RML infiltrate. She had lactic acid 0.55. She is admitted for Community acquired pneumonia. Past Medical History:   Diagnosis Date    Bronchitis     Diabetes ([address]     Hyperlipemia     Hypertension        Past Surgical History:   Procedure Laterality Date    COLONOSCOPY N/A 6/7/2017    COLONOSCOPY / [facility] Polly [facility] MD [facility] ENDOSCOPY    HX CATARACT REMOVAL      HX COLONOSCOPY      2011       Family History   Problem Relation Age of Onset    Heart Disease Mother     Diabetes Father     Breast Cancer Daughter        Social History     Socioeconomic History    Marital status:      Spouse name: Not on file    Number of children: Not on file    Years of education: Not on file    Highest education level: Not on file   Tobacco Use    Smoking status: Never Smoker    Smokeless tobacco: Never Used   Substance and Sexual Activity    Alcohol use: No    Drug use: No       Prior to Admission medications    Medication Sig Start Date End Date Taking? Authorizing Provider   aspirin 81 mg chewable tablet Take 81 mg by mouth daily. Other, MD Linda   CETIRIZINE HCL (CETIRIZINE PO) Take  by mouth. Provider, Historical   MONTELUKAST SODIUM (SINGULAIR PO) Take  by mouth.     Provider, Historical   fluticasone-salmeterol (ADVAIR DISKUS) 100-50 mcg/dose diskus inhaler Take 1 Puff by inhalation every twelve (12) hours. Provider, Historical   NEBULIZER by Does Not Apply route. Provider, Historical   Cholecalciferol, Vitamin D3, (VITAMIN D3) 1,000 unit cap Take  by mouth. Linda Cruz MD   cloNIDine (CATAPRESS) 0.2 mg tablet Take 0.2 mg by mouth two (2) times a day. Linda Cruz MD   glimepiride (AMARYL) 4 mg tablet Take 4 mg by mouth daily. Linda Cruz MD   lisinopril (PRINIVIL, ZESTRIL) 20 mg tablet Take 40 mg by mouth daily. Linda Cruz MD   metoprolol (LOPRESSOR) 100 mg tablet Take 200 mg by mouth daily. Linda Cruz MD   pravastatin (PRAVACHOL) 40 mg tablet Take 40 mg by mouth daily. Linda Cruz MD   verapamil SR (CALAN-SR) 180 mg CR tablet Take 180 mg by mouth daily. Linda Cruz MD       No Known Allergies     Review of Systems:    Unremarkable except as stated in HPI  Denies headache, dizziness or chest pain.   Last BM today, normal, denies nausea, diarrhea or dysuria      Physical Exam:      Visit Vitals  /75 (BP 1 Location: Left arm, BP Patient Position: At rest)   Pulse 77   Temp 98.2 °F (36.8 °C)   Resp 18   Ht 5' 4\" (1.626 m)   Wt 81.6 kg (180 lb)   SpO2 99%   BMI 30.90 kg/m²       Physical Exam:  GENERAL: fatigued, cooperative, mild distress  HEENT KAILEY, fundal exams normal, conjunctiva clear, anicteric, facial symmetry, pharynx clear, neck supple  Chest Rhonchi posterior right, anterior expiratory wheeze right anterior, HR reg 77  Abdomen soft, BS+, non tender  Extremities moving all 4 extremities well, no edema, calves soft, + pulses      Lab/Data Review:  Labs: Results:       Chemistry Recent Labs     06/30/19  1131   GLU 67*   *   K 3.8   *   CO2 29   BUN 22*   CREA 0.89   CA 9.8   AGAP 7   BUCR 25*      TP 8.1   ALB 3.6   GLOB 4.5*   AGRAT 0.8      CBC w/Diff Recent Labs     06/30/19  1131   WBC 9.4   RBC 4.73   HGB 12.9   HCT 39.2      GRANS 50   LYMPH 25   EOS 23*      Coagulation No results for input(s): PTP, INR, APTT in the last 72 hours. No lab exists for component: INREXT    Iron/Ferritin No results for input(s): IRON in the last 72 hours. No lab exists for component: TIBCCALC   BNP No results for input(s): BNPP in the last 72 hours. Cardiac Enzymes No results for input(s): CPK, CKND1, SHEA in the last 72 hours. No lab exists for component: CKRMB, TROIP   Liver Enzymes Recent Labs     06/30/19  1131   TP 8.1   ALB 3.6      SGOT 17      Thyroid Studies No results found for: T4, T3U, TSH, TSHEXT       All Micro Results     Procedure Component Value Units Date/Time    CULTURE, BLOOD [[id number]] Collected:  06/30/19 1228    Order Status:  Completed Specimen:  Whole Blood Updated:  06/30/19 1908    CULTURE, BLOOD [[id number]] Collected:  06/30/19 1210    Order Status:  Completed Specimen:  Blood Updated:  06/30/19 1908          Imaging Reviewed:  XR Results (most recent):  Results from Hospital Encounter encounter on 06/30/19   XR CHEST PA LAT    Narrative CHEST PA AND LATERAL:    INDICATIONS: Cough and wheezing    COMPARISONS: 2/2/2019    FINDINGS:     Lungs: Newly developed right middle lobe atelectasis. Cardiac silhouette and mediastinal contours: Normal.    Pleural spaces: No pneumothorax or pleural effusion evident. Bones and soft tissues: Chronic lower thoracic compression deformity. Support lines/catheters: None. Impression IMPRESSION:        Newly developed atelectatic right middle lobe.                Assessment:   Principal Problem:    CAP (community acquired pneumonia) (6/30/2019)    Active Problems:    Hypoxia (6/30/2019)      Controlled type 2 diabetes mellitus, without long-term current use of insulin ([facility] [address] (6/30/2019)      Essential hypertension (6/30/2019)      Plan:   Admit to telelmetry  Titrate O2 for pulse ox > 94%  Continue Zithromax and Rocephin  Mucinex, nebulizers  POC glucose with coverage  Continue lisinopril, catapres and metoprolol    Advanced Directives discussed with patient and her family    Full Code      Alida Dejesus DO  6/30/2019, 9:01 PM

## 2019-07-01 NOTE — ROUTINE PROCESS
Bedside shift change report given to LAURA Stuart (oncoming nurse) by Estefani Jean RN (offgoing nurse). Report included the following information SBAR, Kardex, Intake/Output, Recent Results and Cardiac Rhythm NSR.

## 2019-07-01 NOTE — PROGRESS NOTES
[facility] Hospitalist Group  Progress Note    Patient: Nargis Ferguson Age: 80 y.o. : 1934 MR#: [id number] SSN: [id number]  Date: 2019     Subjective:   Harsh cough, SOB and ZURITA. During her morning ADL's got SOB with difficulty with recovering once she sat down. Attempting to eat in between coughing spells. Denies CP/chest discomfort, abdominal pain, N/V, fever or chills. Assessment/Plan:   1. CAP  2. Hypoxia, improved. RA   3. SOB  4. Cough  5. Essential HTN  6. DM2  7. HLD    Plan  1. Continue IV abx/ceftriaxone and azithromycin, mucinex, albuterol neb every 4 hours  2. Currently RA, SPO2 92%. 3. POC glucose, SSI  4. Continue cardiac medications  5. Plan for discharge home tomorrow if medically stable.    Additional Notes:      Case discussed with:  [x]Patient  [x]Family  []Nursing  []Case Management  DVT Prophylaxis:  []Lovenox  [x]Hep SQ  []SCDs  []Coumadin   []On Heparin gtt    Objective:   VS:   Visit Vitals  /87 (BP 1 Location: Left arm, BP Patient Position: At rest)   Pulse 74   Temp 98.2 °F (36.8 °C)   Resp 18   Ht 5' 4\" (1.626 m)   Wt 81.6 kg (180 lb)   SpO2 94%   BMI 30.90 kg/m²      Tmax/24hrs: Temp (24hrs), Av.9 °F (36.6 °C), Min:97.2 °F (36.2 °C), Max:98.2 °F (36.8 °C)      Intake/Output Summary (Last 24 hours) at 2019 1116  Last data filed at 2019 2337  Gross per 24 hour   Intake 270 ml   Output 200 ml   Net 70 ml       General:  Alert, NAD  Cardiovascular:  RRR  Pulmonary:rhonchi and wheeze t/o, harsh productive cough  GI:  +BS in all four quadrants, soft, non-tender  Extremities:  No edema; 2+ dorsalis pedis pulses bilaterally  Neuro: alert and oriented    Family contact: daughter at bedside    Labs:    Recent Results (from the past 24 hour(s))   EKG, 12 LEAD, INITIAL    Collection Time: 19 11:26 AM   Result Value Ref Range    Ventricular Rate 58 BPM    Atrial Rate 58 BPM    P-R Interval 204 ms    QRS Duration 74 ms Q-T Interval 406 ms    QTC Calculation (Bezet) 398 ms    Calculated P Axis 68 degrees    Calculated R Axis 61 degrees    Calculated T Axis 56 degrees    Diagnosis       Sinus bradycardia  Otherwise normal ECG  When compared with ECG of 22-AUG-2015 19:54,  Vent. rate has decreased BY  32 BPM  QT has shortened  Confirmed by Theo Mosher ([id number]) on 6/30/2019 1:56:04 PM     CBC WITH AUTOMATED DIFF    Collection Time: 06/30/19 11:31 AM   Result Value Ref Range    WBC 9.4 4.6 - 13.2 K/uL    RBC 4.73 4.20 - 5.30 M/uL    HGB 12.9 12.0 - 16.0 g/dL    HCT 39.2 35.0 - 45.0 %    MCV 82.9 74.0 - 97.0 FL    MCH 27.3 24.0 - 34.0 PG    MCHC 32.9 31.0 - 37.0 g/dL    RDW 13.6 11.6 - 14.5 %    PLATELET 498 650 - 658 K/uL    MPV 10.1 9.2 - 11.8 FL    NEUTROPHILS 50 42 - 75 %    LYMPHOCYTES 25 20 - 51 %    MONOCYTES 2 2 - 9 %    EOSINOPHILS 23 (H) 0 - 5 %    BASOPHILS 0 0 - 3 %    ABS. NEUTROPHILS 4.6 1.8 - 8.0 K/UL    ABS. LYMPHOCYTES 2.4 0.8 - 3.5 K/UL    ABS. MONOCYTES 0.2 0 - 1.0 K/UL    ABS. EOSINOPHILS 2.2 (H) 0.0 - 0.4 K/UL    ABS. BASOPHILS 0.0 0.0 - 0.06 K/UL    DF MANUAL      PLATELET COMMENTS ADEQUATE PLATELETS      RBC COMMENTS NORMOCYTIC, NORMOCHROMIC     METABOLIC PANEL, COMPREHENSIVE    Collection Time: 06/30/19 11:31 AM   Result Value Ref Range    Sodium 146 (H) 136 - 145 mmol/L    Potassium 3.8 3.5 - 5.5 mmol/L    Chloride 110 (H) 100 - 108 mmol/L    CO2 29 21 - 32 mmol/L    Anion gap 7 3.0 - 18 mmol/L    Glucose 67 (L) 74 - 99 mg/dL    BUN 22 (H) 7.0 - 18 MG/DL    Creatinine 0.89 0.6 - 1.3 MG/DL    BUN/Creatinine ratio 25 (H) 12 - 20      GFR est AA >60 >60 ml/min/1.73m2    GFR est non-AA >60 >60 ml/min/1.73m2    Calcium 9.8 8.5 - 10.1 MG/DL    Bilirubin, total 0.4 0.2 - 1.0 MG/DL    ALT (SGPT) 12 (L) 13 - 56 U/L    AST (SGOT) 17 15 - 37 U/L    Alk.  phosphatase 105 45 - 117 U/L    Protein, total 8.1 6.4 - 8.2 g/dL    Albumin 3.6 3.4 - 5.0 g/dL    Globulin 4.5 (H) 2.0 - 4.0 g/dL    A-G Ratio 0.8 0.8 - 1.7 TROPONIN I    Collection Time: 06/30/19 11:31 AM   Result Value Ref Range    Troponin-I, QT <0.02 0.0 - 0.045 NG/ML   NT-PRO BNP    Collection Time: 06/30/19 11:31 AM   Result Value Ref Range    NT pro- 0 - 1,800 PG/ML   CULTURE, BLOOD    Collection Time: 06/30/19 12:10 PM   Result Value Ref Range    Special Requests: NO SPECIAL REQUESTS      Culture result: NO GROWTH AFTER 12 HOURS     CULTURE, BLOOD    Collection Time: 06/30/19 12:28 PM   Result Value Ref Range    Special Requests: NO SPECIAL REQUESTS      Culture result: NO GROWTH AFTER 12 HOURS     POC LACTIC ACID    Collection Time: 06/30/19 12:28 PM   Result Value Ref Range    Lactic Acid (POC) 0.55 0.40 - 2.00 mmol/L   HEMOGLOBIN A1C WITH EAG    Collection Time: 07/01/19  9:40 AM   Result Value Ref Range    Hemoglobin A1c 6.2 (H) 4.2 - 5.6 %    Est. average glucose 131 mg/dL   CBC WITH AUTOMATED DIFF    Collection Time: 07/01/19  9:40 AM   Result Value Ref Range    WBC 10.6 4.6 - 13.2 K/uL    RBC 4.98 4.20 - 5.30 M/uL    HGB 13.5 12.0 - 16.0 g/dL    HCT 40.6 35.0 - 45.0 %    MCV 81.5 74.0 - 97.0 FL    MCH 27.1 24.0 - 34.0 PG    MCHC 33.3 31.0 - 37.0 g/dL    RDW 13.6 11.6 - 14.5 %    PLATELET 095 902 - 804 K/uL    MPV 10.2 9.2 - 11.8 FL    NEUTROPHILS 62 40 - 73 %    LYMPHOCYTES 22 21 - 52 %    MONOCYTES 4 3 - 10 %    EOSINOPHILS 12 (H) 0 - 5 %    BASOPHILS 0 0 - 2 %    ABS. NEUTROPHILS 6.5 1.8 - 8.0 K/UL    ABS. LYMPHOCYTES 2.3 0.9 - 3.6 K/UL    ABS. MONOCYTES 0.5 0.05 - 1.2 K/UL    ABS. EOSINOPHILS 1.3 (H) 0.0 - 0.4 K/UL    ABS.  BASOPHILS 0.0 0.0 - 0.1 K/UL    DF AUTOMATED     CARDIAC PANEL,(CK, CKMB & TROPONIN)    Collection Time: 07/01/19  9:40 AM   Result Value Ref Range    CK 98 26 - 192 U/L    CK - MB 1.3 <3.6 ng/ml    CK-MB Index 1.3 0.0 - 4.0 %    Troponin-I, QT <0.02 0.0 - 9.296 NG/ML   METABOLIC PANEL, BASIC    Collection Time: 07/01/19  9:40 AM   Result Value Ref Range    Sodium 142 136 - 145 mmol/L    Potassium 3.5 3.5 - 5.5 mmol/L Chloride 106 100 - 108 mmol/L    CO2 29 21 - 32 mmol/L    Anion gap 7 3.0 - 18 mmol/L    Glucose 194 (H) 74 - 99 mg/dL    BUN 16 7.0 - 18 MG/DL    Creatinine 1.07 0.6 - 1.3 MG/DL    BUN/Creatinine ratio 15 12 - 20      GFR est AA 59 (L) >60 ml/min/1.73m2    GFR est non-AA 49 (L) >60 ml/min/1.73m2    Calcium 9.5 8.5 - 10.1 MG/DL   MAGNESIUM    Collection Time: 07/01/19  9:40 AM   Result Value Ref Range    Magnesium 1.6 1.6 - 2.6 mg/dL   PHOSPHORUS    Collection Time: 07/01/19  9:40 AM   Result Value Ref Range    Phosphorus 3.3 2.5 - 4.9 MG/DL       Signed By: Kassi Hansen NP     July 1, 2019

## 2019-07-01 NOTE — PROGRESS NOTES
conducted an initial consultation and Spiritual Assessment for [facility], who is a 80 y.o.,female. Patients Primary Language is: [place]. According to the patients EMR Christian Affiliation is: [facility].     The reason the Patient came to the hospital is:   Patient Active Problem List    Diagnosis Date Noted    Hypoxia 06/30/2019    CAP (community acquired pneumonia) 06/30/2019    Controlled type 2 diabetes mellitus, without long-term current use of insulin ([address] 06/30/2019    Essential hypertension 06/30/2019        The  provided the following Interventions:  Initiated a relationship of care and support. Explored issues of heladio, belief, spirituality and Baptist/ritual needs while hospitalized. Listened empathically. Provided information about Spiritual Care Services. Offered prayer and assurance of continued prayers on patient's behalf. Chart reviewed. The following outcomes where achieved:   confirmed Patient's Christian Affiliation. Patient expressed gratitude for 's visit. Assessment:  Patient's heladio in God is very important for her to cope. Patient does not have any Baptist/cultural needs that will affect patients preferences in health care. There are no spiritual or Baptist issues which require intervention at this time. Plan:  Chaplains will continue to follow and will provide pastoral care on an as needed/requested basis.  recommends bedside caregivers page  on duty if patient shows signs of acute spiritual or emotional distress.     [address]  ([phone])

## 2019-07-01 NOTE — ROUTINE PROCESS
Bedside and Verbal shift change report given to Severa Hugger, RN (oncoming nurse) by Carmelina Webb RN (offgoing nurse). Report included the following information SBAR, Kardex, Intake/Output, MAR, Recent Results, Med Rec Status and Cardiac Rhythm NSR.

## 2019-07-02 ENCOUNTER — HOME HEALTH ADMISSION (OUTPATIENT)
Dept: HOME HEALTH SERVICES | Facility: HOME HEALTH | Age: 84
End: 2019-07-02

## 2019-07-02 ENCOUNTER — APPOINTMENT (OUTPATIENT)
Dept: CT IMAGING | Age: 84
DRG: 195 | End: 2019-07-02
Attending: NURSE PRACTITIONER
Payer: MEDICARE

## 2019-07-02 LAB
ANION GAP SERPL CALC-SCNC: 6 MMOL/L (ref 3–18)
BASOPHILS # BLD: 0 K/UL (ref 0–0.06)
BASOPHILS NFR BLD: 0 % (ref 0–3)
BUN SERPL-MCNC: 20 MG/DL (ref 7–18)
BUN/CREAT SERPL: 19 (ref 12–20)
CALCIUM SERPL-MCNC: 9.3 MG/DL (ref 8.5–10.1)
CHLORIDE SERPL-SCNC: 107 MMOL/L (ref 100–108)
CO2 SERPL-SCNC: 30 MMOL/L (ref 21–32)
CREAT SERPL-MCNC: 1.03 MG/DL (ref 0.6–1.3)
DIFFERENTIAL METHOD BLD: ABNORMAL
EOSINOPHIL # BLD: 1.1 K/UL (ref 0–0.4)
EOSINOPHIL NFR BLD: 10 % (ref 0–5)
ERYTHROCYTE [DISTWIDTH] IN BLOOD BY AUTOMATED COUNT: 13.5 % (ref 11.6–14.5)
GLUCOSE BLD STRIP.AUTO-MCNC: 100 MG/DL (ref 70–110)
GLUCOSE BLD STRIP.AUTO-MCNC: 138 MG/DL (ref 70–110)
GLUCOSE BLD STRIP.AUTO-MCNC: 170 MG/DL (ref 70–110)
GLUCOSE BLD STRIP.AUTO-MCNC: 183 MG/DL (ref 70–110)
GLUCOSE SERPL-MCNC: 106 MG/DL (ref 74–99)
HCT VFR BLD AUTO: 39.8 % (ref 35–45)
HGB BLD-MCNC: 13.2 G/DL (ref 12–16)
LYMPHOCYTES # BLD: 3.9 K/UL (ref 0.8–3.5)
LYMPHOCYTES NFR BLD: 35 % (ref 20–51)
MCH RBC QN AUTO: 27 PG (ref 24–34)
MCHC RBC AUTO-ENTMCNC: 33.2 G/DL (ref 31–37)
MCV RBC AUTO: 81.6 FL (ref 74–97)
MONOCYTES # BLD: 0.6 K/UL (ref 0–1)
MONOCYTES NFR BLD: 5 % (ref 2–9)
NEUTS SEG # BLD: 5.4 K/UL (ref 1.8–8)
NEUTS SEG NFR BLD: 50 % (ref 42–75)
PLATELET # BLD AUTO: 220 K/UL (ref 135–420)
PMV BLD AUTO: 10.4 FL (ref 9.2–11.8)
POTASSIUM SERPL-SCNC: 3.5 MMOL/L (ref 3.5–5.5)
RBC # BLD AUTO: 4.88 M/UL (ref 4.2–5.3)
RBC MORPH BLD: ABNORMAL
SODIUM SERPL-SCNC: 143 MMOL/L (ref 136–145)
WBC # BLD AUTO: 11 K/UL (ref 4.6–13.2)

## 2019-07-02 PROCEDURE — 74011250636 HC RX REV CODE- 250/636: Performed by: INTERNAL MEDICINE

## 2019-07-02 PROCEDURE — 74011000250 HC RX REV CODE- 250: Performed by: HOSPITALIST

## 2019-07-02 PROCEDURE — 97161 PT EVAL LOW COMPLEX 20 MIN: CPT

## 2019-07-02 PROCEDURE — 94761 N-INVAS EAR/PLS OXIMETRY MLT: CPT

## 2019-07-02 PROCEDURE — 65660000000 HC RM CCU STEPDOWN

## 2019-07-02 PROCEDURE — 74011636320 HC RX REV CODE- 636/320: Performed by: INTERNAL MEDICINE

## 2019-07-02 PROCEDURE — 74011000250 HC RX REV CODE- 250: Performed by: EMERGENCY MEDICINE

## 2019-07-02 PROCEDURE — 74011250637 HC RX REV CODE- 250/637: Performed by: HOSPITALIST

## 2019-07-02 PROCEDURE — 85025 COMPLETE CBC W/AUTO DIFF WBC: CPT

## 2019-07-02 PROCEDURE — 36415 COLL VENOUS BLD VENIPUNCTURE: CPT

## 2019-07-02 PROCEDURE — 71260 CT THORAX DX C+: CPT

## 2019-07-02 PROCEDURE — 74011250636 HC RX REV CODE- 250/636: Performed by: NURSE PRACTITIONER

## 2019-07-02 PROCEDURE — 80048 BASIC METABOLIC PNL TOTAL CA: CPT

## 2019-07-02 PROCEDURE — 74011250637 HC RX REV CODE- 250/637: Performed by: INTERNAL MEDICINE

## 2019-07-02 PROCEDURE — 97165 OT EVAL LOW COMPLEX 30 MIN: CPT

## 2019-07-02 PROCEDURE — 74011000250 HC RX REV CODE- 250: Performed by: INTERNAL MEDICINE

## 2019-07-02 PROCEDURE — 82962 GLUCOSE BLOOD TEST: CPT

## 2019-07-02 PROCEDURE — 74011250637 HC RX REV CODE- 250/637: Performed by: NURSE PRACTITIONER

## 2019-07-02 PROCEDURE — 94640 AIRWAY INHALATION TREATMENT: CPT

## 2019-07-02 PROCEDURE — 97116 GAIT TRAINING THERAPY: CPT

## 2019-07-02 PROCEDURE — 87040 BLOOD CULTURE FOR BACTERIA: CPT

## 2019-07-02 PROCEDURE — 97530 THERAPEUTIC ACTIVITIES: CPT

## 2019-07-02 PROCEDURE — 74011250636 HC RX REV CODE- 250/636: Performed by: HOSPITALIST

## 2019-07-02 PROCEDURE — 74011250636 HC RX REV CODE- 250/636: Performed by: EMERGENCY MEDICINE

## 2019-07-02 PROCEDURE — 74011636637 HC RX REV CODE- 636/637: Performed by: HOSPITALIST

## 2019-07-02 RX ORDER — CLONIDINE HYDROCHLORIDE 0.1 MG/1
0.2 TABLET ORAL 2 TIMES DAILY
Status: DISCONTINUED | OUTPATIENT
Start: 2019-07-02 | End: 2019-07-02

## 2019-07-02 RX ORDER — ALBUTEROL SULFATE 90 UG/1
2 AEROSOL, METERED RESPIRATORY (INHALATION)
Qty: 1 INHALER | Refills: 0 | Status: SHIPPED | OUTPATIENT
Start: 2019-07-02 | End: 2019-12-18

## 2019-07-02 RX ORDER — CLONIDINE HYDROCHLORIDE 0.1 MG/1
0.1 TABLET ORAL
Status: COMPLETED | OUTPATIENT
Start: 2019-07-02 | End: 2019-07-02

## 2019-07-02 RX ORDER — AMOXICILLIN AND CLAVULANATE POTASSIUM 875; 125 MG/1; MG/1
1 TABLET, FILM COATED ORAL EVERY 12 HOURS
Qty: 14 TAB | Refills: 0 | Status: SHIPPED | OUTPATIENT
Start: 2019-07-02 | End: 2019-07-09

## 2019-07-02 RX ORDER — LOPERAMIDE HYDROCHLORIDE 2 MG/1
2 CAPSULE ORAL
Qty: 60 CAP | Refills: 0 | Status: SHIPPED | OUTPATIENT
Start: 2019-07-02 | End: 2019-07-12

## 2019-07-02 RX ORDER — ALBUTEROL SULFATE 0.83 MG/ML
SOLUTION RESPIRATORY (INHALATION)
Status: DISPENSED
Start: 2019-07-02 | End: 2019-07-02

## 2019-07-02 RX ORDER — CLONIDINE HYDROCHLORIDE 0.1 MG/1
0.1 TABLET ORAL 2 TIMES DAILY
Status: DISCONTINUED | OUTPATIENT
Start: 2019-07-02 | End: 2019-07-02

## 2019-07-02 RX ORDER — AZITHROMYCIN 500 MG/1
500 TABLET, FILM COATED ORAL DAILY
Qty: 3 TAB | Refills: 0 | Status: SHIPPED | OUTPATIENT
Start: 2019-07-02 | End: 2019-07-05

## 2019-07-02 RX ORDER — FAMOTIDINE 20 MG/1
20 TABLET, FILM COATED ORAL DAILY
Qty: 60 TAB | Refills: 0 | Status: ON HOLD | OUTPATIENT
Start: 2019-07-03 | End: 2021-05-22

## 2019-07-02 RX ORDER — CLONIDINE HYDROCHLORIDE 0.1 MG/1
0.2 TABLET ORAL 2 TIMES DAILY
Status: DISCONTINUED | OUTPATIENT
Start: 2019-07-02 | End: 2019-07-03 | Stop reason: HOSPADM

## 2019-07-02 RX ORDER — ALBUTEROL SULFATE 0.83 MG/ML
2.5 SOLUTION RESPIRATORY (INHALATION)
Status: DISCONTINUED | OUTPATIENT
Start: 2019-07-02 | End: 2019-07-02

## 2019-07-02 RX ORDER — ALBUTEROL SULFATE 0.83 MG/ML
2.5 SOLUTION RESPIRATORY (INHALATION)
Status: DISCONTINUED | OUTPATIENT
Start: 2019-07-02 | End: 2019-07-03

## 2019-07-02 RX ORDER — HYDRALAZINE HYDROCHLORIDE 20 MG/ML
10 INJECTION INTRAMUSCULAR; INTRAVENOUS
Status: DISCONTINUED | OUTPATIENT
Start: 2019-07-02 | End: 2019-07-03 | Stop reason: HOSPADM

## 2019-07-02 RX ORDER — LOPERAMIDE HYDROCHLORIDE 2 MG/1
2 CAPSULE ORAL
Status: DISCONTINUED | OUTPATIENT
Start: 2019-07-02 | End: 2019-07-03 | Stop reason: HOSPADM

## 2019-07-02 RX ADMIN — GUAIFENESIN 1200 MG: 600 TABLET, EXTENDED RELEASE ORAL at 10:02

## 2019-07-02 RX ADMIN — HYDRALAZINE HYDROCHLORIDE 10 MG: 20 INJECTION, SOLUTION INTRAMUSCULAR; INTRAVENOUS at 12:33

## 2019-07-02 RX ADMIN — LISINOPRIL 40 MG: 10 TABLET ORAL at 10:02

## 2019-07-02 RX ADMIN — IOPAMIDOL 75 ML: 612 INJECTION, SOLUTION INTRAVENOUS at 18:15

## 2019-07-02 RX ADMIN — GUAIFENESIN AND CODEINE PHOSPHATE 10 ML: 100; 10 SOLUTION ORAL at 04:21

## 2019-07-02 RX ADMIN — HEPARIN SODIUM 5000 UNITS: 5000 INJECTION INTRAVENOUS; SUBCUTANEOUS at 04:21

## 2019-07-02 RX ADMIN — GUAIFENESIN 1200 MG: 600 TABLET, EXTENDED RELEASE ORAL at 22:14

## 2019-07-02 RX ADMIN — CEFTRIAXONE SODIUM 2 G: 2 INJECTION, POWDER, FOR SOLUTION INTRAMUSCULAR; INTRAVENOUS at 12:33

## 2019-07-02 RX ADMIN — CLONIDINE HYDROCHLORIDE 0.1 MG: 0.1 TABLET ORAL at 04:21

## 2019-07-02 RX ADMIN — ALBUTEROL SULFATE 2.5 MG: 2.5 SOLUTION RESPIRATORY (INHALATION) at 00:34

## 2019-07-02 RX ADMIN — VERAPAMIL HYDROCHLORIDE 180 MG: 180 TABLET, FILM COATED, EXTENDED RELEASE ORAL at 12:33

## 2019-07-02 RX ADMIN — METOPROLOL TARTRATE 200 MG: 50 TABLET ORAL at 10:02

## 2019-07-02 RX ADMIN — INSULIN LISPRO 2 UNITS: 100 INJECTION, SOLUTION INTRAVENOUS; SUBCUTANEOUS at 17:13

## 2019-07-02 RX ADMIN — INSULIN LISPRO 2 UNITS: 100 INJECTION, SOLUTION INTRAVENOUS; SUBCUTANEOUS at 10:11

## 2019-07-02 RX ADMIN — MONTELUKAST 10 MG: 10 TABLET, FILM COATED ORAL at 22:14

## 2019-07-02 RX ADMIN — PRAVASTATIN SODIUM 40 MG: 20 TABLET ORAL at 10:02

## 2019-07-02 RX ADMIN — ALBUTEROL SULFATE 2.5 MG: 2.5 SOLUTION RESPIRATORY (INHALATION) at 07:58

## 2019-07-02 RX ADMIN — LORATADINE 10 MG: 10 TABLET ORAL at 22:14

## 2019-07-02 RX ADMIN — HEPARIN SODIUM 5000 UNITS: 5000 INJECTION INTRAVENOUS; SUBCUTANEOUS at 22:14

## 2019-07-02 RX ADMIN — CLONIDINE HYDROCHLORIDE 0.2 MG: 0.1 TABLET ORAL at 19:55

## 2019-07-02 RX ADMIN — ASPIRIN 81 MG 81 MG: 81 TABLET ORAL at 10:02

## 2019-07-02 RX ADMIN — HEPARIN SODIUM 5000 UNITS: 5000 INJECTION INTRAVENOUS; SUBCUTANEOUS at 12:32

## 2019-07-02 RX ADMIN — ALBUTEROL SULFATE 2.5 MG: 2.5 SOLUTION RESPIRATORY (INHALATION) at 04:24

## 2019-07-02 RX ADMIN — ALBUTEROL SULFATE 2.5 MG: 2.5 SOLUTION RESPIRATORY (INHALATION) at 20:09

## 2019-07-02 RX ADMIN — AZITHROMYCIN MONOHYDRATE 500 MG: 500 INJECTION, POWDER, LYOPHILIZED, FOR SOLUTION INTRAVENOUS at 15:56

## 2019-07-02 RX ADMIN — BUDESONIDE 500 MCG: 0.5 INHALANT RESPIRATORY (INHALATION) at 07:58

## 2019-07-02 RX ADMIN — FAMOTIDINE 20 MG: 20 TABLET, FILM COATED ORAL at 10:02

## 2019-07-02 RX ADMIN — BUDESONIDE 500 MCG: 0.5 INHALANT RESPIRATORY (INHALATION) at 20:08

## 2019-07-02 NOTE — PROGRESS NOTES
Problem: Mobility Impaired (Adult and Pediatric)  Goal: *Acute Goals and Plan of Care (Insert Text)  Description  Physical Therapy Goals  Initiated 7/2/2019 and to be accomplished within 7 day(s)     1. Patient will transfer from bed to chair and chair to bed with independence using the least restrictive device. 2.  Patient will perform sit to stand with independence. 3.  Patient will ambulate with independence for 200 feet with the least restrictive device. 4.  Patient will ascend/descend 1 step with no handrail(s) with supervision/set-up. To prepare pt for home mobility. PLOF:  Pt lives alone in a 1 story apartment with 1 step to enter. Pt has a daughter and son who stop by frequently. Prior to this hospitalization , pt had been ambulating independently in her apartment and to Adventism with no AD. Outcome: Progressing Towards Goal  PHYSICAL THERAPY EVALUATION    Patient: Nargis Ferguson (67 y.o. female)  Date: 7/2/2019  Primary Diagnosis: CAP (community acquired pneumonia) [J18.9]  Hypoxia [R09.02]        Precautions:        PLOF: See goals section above    ASSESSMENT :  Based on the objective data described below, the patient presents with impaired gait and decreased functional activity tolerance following admission for community acquired pneumonia and hypoxia. Pt was cleared by nursing to work with therapy. Pt was received sitting up in recliner, agreeable to participate in PT . Pt was seen partially with OT to maximize participation and safety . Pt performed sit-stand with supervision. Pt displayed good sitting balance and good standing balance, performing several changes in direction with no loss of balance . Pt ambulated 60 feet with supervision with no AD at decreased pace and with decreased LE clearance. Pt was limited by starting to cough. She returned to her recliner and took a seated rest break. Her coughing subsided. SPO2 on room air was 99%.  Pt went on a second trial of ambulation and again ambulated x 60 feet with no AD but again limited by severe coughing. SPO2 during ambulation/coughing was 96% on room air. Pt returned to room. At conclusion of session, pt was left resting comfortably in chair, needs met, call bell in reach, nurse notified. Patient will benefit from skilled intervention to address the above impairments. Patient's rehabilitation potential is considered to be Good  Factors which may influence rehabilitation potential include:   ? None noted  ? Mental ability/status  ? Medical condition  ? Home/family situation and support systems  ? Safety awareness  ? Pain tolerance/management  ? Other:      PLAN :  Recommendations and Planned Interventions:   ?           Bed Mobility Training             ? Neuromuscular Re-Education  ? Transfer Training                   ? Orthotic/Prosthetic Training  ? Gait Training                          ? Modalities  ? Therapeutic Exercises           ? Edema Management/Control  ? Therapeutic Activities            ? Family Training/Education  ? Patient Education  ? Other (comment):    Frequency/Duration: Patient will be followed by physical therapy 1-2 times per day to address goals. Discharge Recommendations: None  Further Equipment Recommendations for Discharge: N/A     SUBJECTIVE:   Patient stated ? My daughter says I cough because I talk to much. ?    OBJECTIVE DATA SUMMARY:     Past Medical History:   Diagnosis Date    Bronchitis     Diabetes ([facility] [address]     Hyperlipemia     Hypertension      Past Surgical History:   Procedure Laterality Date    COLONOSCOPY N/A 6/7/2017    COLONOSCOPY / polypectomy performed by Eden Reynaga MD at [facility] ENDOSCOPY    HX CATARACT REMOVAL      HX COLONOSCOPY      2011     Barriers to Learning/Limitations: None  Compensate with: N/A  Home Situation:  Home Situation  Home Environment: Apartment  # Steps to Enter: 1  Rails to Enter: No  One/Two Story Residence: One story  Living Alone: Yes  Support Systems: Family member(s)  Patient Expects to be Discharged to[de-identified] Apartment  Current DME Used/Available at Home: None  Tub or Shower Type: (Pt reports she sponge bathes)  Critical Behavior:  Neurologic State: Alert  Orientation Level: Oriented X4  Cognition: Follows commands  Safety/Judgement: Fall prevention  Psychosocial  Patient Behaviors: Calm; Cooperative  Purposeful Interaction: Yes  Pt Identified Daily Priority: Clinical issues (comment)  Caritas Process: Establish trust;Create healing environment; Attend basic human needs; Teaching/learning  Caring Interventions: Reassure; Therapeutic modalities  Reassure: Therapeutic listening;Caring rounds  Therapeutic Modalities: Intentional therapeutic touch    Strength:    Strength: Generally decreased, functional    Tone & Sensation:   Tone: Normal    Sensation: Intact    Range Of Motion:  AROM: Within functional limits    Functional Mobility:  Bed Mobility:  Supine to Sit: Independent    Transfers:  Sit to Stand: Supervision  Stand to Sit: Supervision    Balance:   Sitting: Intact  Standing: Intact  Standing - Static: Good  Standing - Dynamic : Fair(+)  Ambulation/Gait Training:  Distance (ft): 60 Feet (ft)  Assistive Device: (none)  Ambulation - Level of Assistance: Supervision  Gait Description (WDL): Exceptions to WDL  Gait Abnormalities: Decreased step clearance    Base of Support: Widened  Speed/Jennifer: Pace decreased (<100 feet/min)      Pain:  Pain level pre-treatment: 0/10   Pain level post-treatment: 0/10   Pain Intervention(s) : N/A  Response to intervention: N/A    Activity Tolerance:   Fair +  Please refer to the flowsheet for vital signs taken during this treatment. After treatment:   ?         Patient left in no apparent distress sitting up in chair  ? Patient left in no apparent distress in bed  ? Call bell left within reach  ? Nursing notified  ? Caregiver present  ? Bed alarm activated  ? SCDs applied    COMMUNICATION/EDUCATION:   ?         Role of Physical Therapy in the acute care setting. ?         Fall prevention education was provided and the patient/caregiver indicated understanding. ? Patient/family have participated as able in goal setting and plan of care. ?         Patient/family agree to work toward stated goals and plan of care. ?         Patient understands intent and goals of therapy, but is neutral about his/her participation. ? Patient is unable to participate in goal setting/plan of care: ongoing with therapy staff. ?         Other:     Thank you for this referral.  Jak Scherer, PT   Time Calculation: 23 mins      Eval Complexity: History: MEDIUM  Complexity : 1-2 comorbidities / personal factors will impact the outcome/ POC Exam:MEDIUM Complexity : 3 Standardized tests and measures addressing body structure, function, activity limitation and / or participation in recreation  Presentation: MEDIUM Complexity : Evolving with changing characteristics  Clinical Decision Making:Medium Complexity    Overall Complexity:MEDIUM

## 2019-07-02 NOTE — PROGRESS NOTES
Problem: Self Care Deficits Care Plan (Adult)  Goal: *Acute Goals and Plan of Care (Insert Text)  Outcome: Resolved/Met   OCCUPATIONAL THERAPY EVALUATION/DISCHARGE    Patient: Audrey Hooper (80 y.o. female)  Date: 7/2/2019  Primary Diagnosis: CAP (community acquired pneumonia) [J18.9]  Hypoxia [R09.02]        Precautions:      PLOF: Pt reports she lives alone in an apartment on the ground level. Pt was (I) with basic self-care/ADLs and IADLs, including cooking and medication management PTA. She has a son and daughter who comes by her home to check on her. Pt does not use an AD for functional mobility. ASSESSMENT AND RECOMMENDATIONS:  Pt cleared to participate in skilled OT by Rn. Upon entering room, pt seated in chair, alert, and agreeable to therapy session. Based on the objective data described below, the patient presents she is Mod(I) to (I) in most basic self-care/ADLs, requiring supervision for bathroom mobility for toilet transfers. Will defer to PT for functional balance and mobility. Pt was educated on the role of Ot, evaluation process, and pacing with pt demonstrating good understanding. At the end of the session, pt left resting comfortably in the recliner with all needs met and PT present. Skilled occupational therapy is not indicated at this time. Discharge Recommendations: Home Health safety evaluation  Further Equipment Recommendations for Discharge: N/A     SUBJECTIVE:   Patient stated I use a pan referring to sponge bathing.     OBJECTIVE DATA SUMMARY:     Past Medical History:   Diagnosis Date    Bronchitis     Diabetes ([address]     Hyperlipemia     Hypertension      Past Surgical History:   Procedure Laterality Date    COLONOSCOPY N/A 6/7/2017    COLONOSCOPY / polypectomy performed by Rakesh Acosta MD at [facility] ENDOSCOPY    HX CATARACT REMOVAL      HX COLONOSCOPY      2011     Barriers to Learning/Limitations: None  Compensate with: visual, verbal, tactile, kinesthetic cues/model    Home Situation:   Home Situation  Home Environment: Apartment  # Steps to Enter: 1  Rails to Enter: No  One/Two Story Residence: One story  Living Alone: Yes  Support Systems: Family member(s)  Patient Expects to be Discharged to[de-identified] Apartment  Current DME Used/Available at Home: None  Tub or Shower Type: (Pt reports she sponge bathes)  ? Right hand dominant   ? Left hand dominant    Cognitive/Behavioral Status:  Neurologic State: Alert  Orientation Level: Oriented X4  Cognition: Follows commands  Safety/Judgement: Fall prevention    Skin: Visible skin appeared intact  Edema: None noted    Vision/Perceptual:    Acuity: Able to read clock/calendar on wall without difficulty        Coordination: BUE  Coordination: Within functional limits  Fine Motor Skills-Upper: Left Intact; Right Intact    Gross Motor Skills-Upper: Left Intact; Right Intact    Balance:  Sitting: Intact  Standing: Intact  Standing - Static: Good  Standing - Dynamic : Fair(+)    Strength: BUE  Strength: Within functional limits    Tone & Sensation: BUE  Tone: Normal  Sensation: Intact    Range of Motion: BUE  AROM: Within functional limits      Functional Mobility and Transfers for ADLs:  Bed Mobility:  Pt sitting in chair upon arrival  Transfers:  Sit to Stand: Supervision  Stand to Sit: Supervision    ADL Assessment:  Feeding: Independent    Oral Facial Hygiene/Grooming: Independent    Bathing: Modified independent    Upper Body Dressing: Independent    Lower Body Dressing: Independent    Toileting: Supervision      ADL Intervention:  Cognitive Retraining  Safety/Judgement: Fall prevention    Pain:  Pain level pre-treatment: 0/10   Pain level post-treatment: 0/10   Pain Intervention(s): Medication (see MAR); Rest, Ice, Repositioning  Response to intervention: Nurse notified, See doc flow    Activity Tolerance:   Good    Please refer to the flowsheet for vital signs taken during this treatment.   After treatment:   ?  Patient left in no apparent distress sitting up in chair  ? Patient left in no apparent distress in bed  ? Call bell left within reach  ? Nursing notified  ? PT present  ? Bed alarm activated    COMMUNICATION/EDUCATION:   ?      Role of Occupational Therapy in the acute care setting  ? Home safety education was provided and the patient/caregiver indicated understanding. ? Patient/family have participated as able and agree with findings and recommendations. ?      Patient is unable to participate in plan of care at this time. Thank you for this referral.  Maris Price, OT  Time Calculation: 25 mins      Eval Complexity: History: MEDIUM Complexity : Expanded review of history including physical, cognitive and psychosocial  history ; Examination: LOW Complexity : 1-3 performance deficits relating to physical, cognitive , or psychosocial skils that result in activity limitations and / or participation restrictions ;    Decision Making:LOW Complexity : No comorbidities that affect functional and no verbal or physical assistance needed to complete eval tasks

## 2019-07-02 NOTE — PROGRESS NOTES
Dyspnea on 6 minute walk. Unable to complete sentences. No use of accessory muscles. Oxygenation at 90% on room air. HR increased to 120-130s. Bilateral wheeze on exam  Stat CT chest, Stat RT for nebs. Discharge cancelled.    Will consult Pulm in AM

## 2019-07-02 NOTE — DIABETES MGMT
GLYCEMIC CONTROL PLAN OF CARE     Assessment/Recommendations:  Pt is an 80year old female with a past medical history significant for diabetes, hypertension, and hyperlipidemia. Pt has order for correctional Lispro insulin coverage. Continue inpatient monitoring and intervention. Most recent blood glucose values: 194, 103, 106 mg/dL     Current A1C of 6.2% is equivalent to average blood glucose of 131 mg/dl over the past 2-3 months.     Current hospital diabetes medications:   Correctional Lispro insulin 4 times daily ACHS (normal insulin sensitivity)    Previous day's insulin requirements:   None noted    Home diabetes medications:  Glimepiride 4 mg daily     Diet: Diabetic consistent carbohydrate       Juan F Peacock RD, CDE

## 2019-07-02 NOTE — HOME CARE
Received  referral for PT/OT, but upon talking with patient and daughter Pepper Hedrick state they do not want [facility] PT/OT,that they will follow up with PCP to get set up for outpatient PT/OT at a facility, pt and daughter did agree to accept a [facility] visit for PNA, [facility] referral processed for [facility] for PNA at this time, notified [facility] that pt declined PT/OT, pt's daughter Rizwana Lindsay) states that pt will be staying with her for a few days at [address] [place] [address]. Discharge order  noted for today. ASHUTOSH OCAMPO.

## 2019-07-02 NOTE — PROGRESS NOTES
Reason for Admission:  CAP (community acquired pneumonia) [J18.9]  Hypoxia [R09.02]                 RRAT Score:    13            Plan for utilizing home health:    Not ordered at this time, PT/OT steffen pending, pt has services through [facility] on call. Likelihood of Readmission:   LOW                         Transition of Care Plan:              Initial assessment completed with patient. Cognitive status of patient: oriented to time, place, person and situation. Face sheet information confirmed:  yes. The patient designates daughter, Amari Peralta, to participate in her discharge plan and to receive any needed information. This patient lives in a single family home alone, son stays with pt sometimes, daughter lives nearby. Patient is able to navigate steps as needed. Prior to hospitalization, patient was considered to be independent with ADLs/IADLS : yes . Daughter drives patient. Patient has a current ACP document on file: no  The daughter will be available to transport patient home upon discharge. The patient reports she uses NO medical equipment and is trying not to have to resort to that. Patient is not currently active with home health. Patient has not stayed in a skilled nursing facility or rehab. This patient is on dialysis :no     Currently, the discharge plan is Home and Home with [address] [place] [address] Terell Lam. The patient states that she can obtain her medications from the pharmacy, and take her medications as directed. Patient's current insurance is [facility]       Care Management Interventions  PCP Verified by CM:  Yes  Palliative Care Criteria Met (RRAT>21 & CHF Dx)?: No  Mode of Transport at Discharge: Self  Transition of Care Consult (CM Consult): Home Health, Discharge Planning  MyChart Signup: No  Physical Therapy Consult: Yes  Occupational Therapy Consult: Yes  Current Support Network: Lives Alone, Family Lives Nearby  Confirm Follow Up Transport: Family  Plan discussed with Pt/Family/Caregiver: Yes  Discharge Location  Discharge Placement: Kristie Reardon, MSW  Case Management  [phone]

## 2019-07-02 NOTE — HOME CARE
Noted that patient's discharge has been cancelled due to resp issues ,notified [facility] central intake that pt's d/c has been cancelled. [facility] will continue to follow. ASHUTOSH OCAMPO.

## 2019-07-02 NOTE — DISCHARGE SUMMARY
[facility]  [facility] Summary       Patient: Lucas Watt Age: 80 y.o. : 1934 MR#: [id number] [id number]: [id number]  PCP on record: Jose Rodriguez MD  Admit date: 2019  Discharge date: 2019    Disposition:    []Home   [x]Home with Home Health   []SNF/NH   []Rehab   []Home with family   []Alternate Facility:____________________    Admission Diagnoses:  CAP (community acquired pneumonia) [J18.9]  Hypoxia [R09.02]    Discharge Diagnoses:            1. Hypoxia d/t CAP. Hypoxia now resolved. 2. CAP  3. Cough suspect multifactorial d/t CAP, GERD. If unresolved in 2 weeks, PCP to f/u with CT chest   4. Essential HTN  5. DM2  6. HLD  7. GERD  8. Coagulase negative staphylococci Bacteremia X 1 aerobic bottle. Suspect contamination. Discharge Medications:     Current Discharge Medication List      START taking these medications    Details          guaiFENesin ER (MUCINEX) 1,200 mg Ta12 ER tablet Take 1 Tab by mouth every twelve (12) hours for 5 days. Qty: 10 Tab, Refills: 0      famotidine (PEPCID) 20 mg tablet Take 1 Tab by mouth daily. Qty: 60 Tab, Refills: 0      albuterol (PROVENTIL HFA, VENTOLIN HFA, PROAIR HFA) 90 mcg/actuation inhaler Take 2 Puffs by inhalation every six (6) hours as needed for Wheezing. Qty: 1 Inhaler, Refills: 0      azithromycin (ZITHROMAX) 500 mg tab Take 1 Tab by mouth daily for 3 days. Qty: 3 Tab, Refills: 0      amoxicillin-clavulanate (AUGMENTIN) 875-125 mg per tablet Take 1 Tab by mouth every twelve (12) hours for 7 days. Qty: 14 Tab, Refills: 0         CONTINUE these medications which have NOT CHANGED    Details   aspirin 81 mg chewable tablet Take 81 mg by mouth daily. CETIRIZINE HCL (CETIRIZINE PO) Take  by mouth. MONTELUKAST SODIUM (SINGULAIR PO) Take  by mouth.       fluticasone-salmeterol (ADVAIR DISKUS) 100-50 mcg/dose diskus inhaler Take 1 Puff by inhalation every twelve (12) hours. NEBULIZER by Does Not Apply route. Cholecalciferol, Vitamin D3, (VITAMIN D3) 1,000 unit cap Take  by mouth.      cloNIDine (CATAPRESS) 0.2 mg tablet Take 0.2 mg by mouth two (2) times a day. glimepiride (AMARYL) 4 mg tablet Take 4 mg by mouth daily. lisinopril (PRINIVIL, ZESTRIL) 20 mg tablet Take 40 mg by mouth daily. metoprolol (LOPRESSOR) 100 mg tablet Take 200 mg by mouth daily. pravastatin (PRAVACHOL) 40 mg tablet Take 40 mg by mouth daily. verapamil SR (CALAN-SR) 180 mg CR tablet Take 180 mg by mouth daily. Consults:    - PT/OT/SLP  - Case Management     Procedures:  -   NONE    Significant Diagnostic Studies:   -  Study Result     CHEST PA AND LATERAL:     INDICATIONS: Cough and wheezing     COMPARISONS: 2/2/2019     FINDINGS:      Lungs: Newly developed right middle lobe atelectasis.      Cardiac silhouette and mediastinal contours: Normal.     Pleural spaces: No pneumothorax or pleural effusion evident.     Bones and soft tissues: Chronic lower thoracic compression deformity.     Support lines/catheters: None.     IMPRESSION  IMPRESSION:         Newly developed atelectatic right middle lobe. Hospital Course by Problem   HPI per admitting MD  \"Rose Marie Florez is a 80 y.o. female with a history of diabetes, hypertension, and hyperlipidemia. She has not been hospitalized since she gave childbirth 53 years ago. Patient developed increased cough and shortness of breath the past 3 days and today started wheezing and came to the ER at [facility]. In the ED Ms. Elsie Engel was found to have an increased oxygen requirement, wheezing and shortness of breath. Chest film with new RML infiltrate. She had lactic acid 0.55. She is admitted for Community acquired pneumonia. \"    1. Hypoxia d/t CAP. In patient with subacute cough suspect multifactorial d/t CAP, GERD.  Patient was initiated on IV abx, nebs, bronchial hygiene protocol and showed improvement. She was subsequently weaned off oxygen with oxygenation maintained >95% on room air, no SOB. Acute vs subacute cough was treated with robitussin syrup, mucinex. Patient education provided to patient and daughter specifically if cough does not improve within 2 weeks, PCP to f/u with CT chest. No further inpatient interventions warranted. Exam otherwise reassuring. PT/OT rec HHC. F/U with PCP in 5-7 days. 2. Essential HTN- patient was resumed on home regimen medications including clonidine at time of discharge. BP noted to respond appropriately, no further inpatient interventions warranted. Exam otherwise reassuring. OP f/u PCP in 5-7 days. 3. DM2. No acute issues. Initiated on SSI during this admission. May resume oral management at home. OP F/U PCP. 4. HLD- STATIN     5. GERD. Initiated on Pepcid. No further inpatient interventions warranted. OP f/u PCP. 6. Coagulase negative staphylococci Bacteremia X 1 aerobic bottle. Suspect contamination. Patient non toxic appearing. VS stable. States feels much better, ambulating about room. Tolerating oral diet. Repeat blood cultures drawn day of discharge. No further inpatient interventions warranted. Will follow blood cultures with plan to call daughter pending results if warranted. Daughter verbalized understanding of plan. Today's examination of the patient revealed:     Subjective:   Alert and oriented. Sitting in bedside chair, cough improved. Daughter at bedside. Reporting patient had X 1 episode nauseas s/p IV hydralazine. No vomiting. Tolerating meals. RN reported X 1 episode diarrhea. No fevers, chills, abd pain. PNR imodium added to regimen.    Objective:   VS:   Visit Vitals  /69 (BP 1 Location: Right arm, BP Patient Position: Sitting)   Pulse 86   Temp 98.3 °F (36.8 °C)   Resp 16   Ht 5' 4\" (1.626 m)   Wt 76.3 kg (168 lb 4.8 oz)   SpO2 95%   BMI 28.89 kg/m²      Tmax/24hrs: Temp (24hrs), Av.3 °F (36.8 °C), Min:97.3 °F (36.3 °C), Max:98.8 °F (37.1 °C)     Input/Output:     Intake/Output Summary (Last 24 hours) at 7/2/2019 1607  Last data filed at 7/2/2019 0425  Gross per 24 hour   Intake 270 ml   Output 1300 ml   Net -1030 ml       General:  Alert, NAD  Cardiovascular:  RRR  Pulmonary:  LSC throughout; respiratory effort WNL  GI:  +BS in all four quadrants, soft, non-tender  Extremities:  No edema; 2+ dorsalis pedis pulses bilaterally  Additional:      Labs:    Recent Results (from the past 24 hour(s))   GLUCOSE, POC    Collection Time: 07/01/19 10:10 PM   Result Value Ref Range    Glucose (POC) 103 70 - 110 mg/dL   CBC WITH AUTOMATED DIFF    Collection Time: 07/02/19  2:45 AM   Result Value Ref Range    WBC 11.0 4.6 - 13.2 K/uL    RBC 4.88 4.20 - 5.30 M/uL    HGB 13.2 12.0 - 16.0 g/dL    HCT 39.8 35.0 - 45.0 %    MCV 81.6 74.0 - 97.0 FL    MCH 27.0 24.0 - 34.0 PG    MCHC 33.2 31.0 - 37.0 g/dL    RDW 13.5 11.6 - 14.5 %    PLATELET 515 270 - 487 K/uL    MPV 10.4 9.2 - 11.8 FL    NEUTROPHILS 50 42 - 75 %    LYMPHOCYTES 35 20 - 51 %    MONOCYTES 5 2 - 9 %    EOSINOPHILS 10 (H) 0 - 5 %    BASOPHILS 0 0 - 3 %    ABS. NEUTROPHILS 5.4 1.8 - 8.0 K/UL    ABS. LYMPHOCYTES 3.9 (H) 0.8 - 3.5 K/UL    ABS. MONOCYTES 0.6 0 - 1.0 K/UL    ABS. EOSINOPHILS 1.1 (H) 0.0 - 0.4 K/UL    ABS.  BASOPHILS 0.0 0.0 - 0.06 K/UL    DF MANUAL      RBC COMMENTS NORMOCYTIC, NORMOCHROMIC     METABOLIC PANEL, BASIC    Collection Time: 07/02/19  2:45 AM   Result Value Ref Range    Sodium 143 136 - 145 mmol/L    Potassium 3.5 3.5 - 5.5 mmol/L    Chloride 107 100 - 108 mmol/L    CO2 30 21 - 32 mmol/L    Anion gap 6 3.0 - 18 mmol/L    Glucose 106 (H) 74 - 99 mg/dL    BUN 20 (H) 7.0 - 18 MG/DL    Creatinine 1.03 0.6 - 1.3 MG/DL    BUN/Creatinine ratio 19 12 - 20      GFR est AA >60 >60 ml/min/1.73m2    GFR est non-AA 51 (L) >60 ml/min/1.73m2    Calcium 9.3 8.5 - 10.1 MG/DL   GLUCOSE, POC    Collection Time: 07/02/19 10:11 AM   Result Value Ref Range    Glucose (POC) 183 (H) 70 - 110 mg/dL   GLUCOSE, POC    Collection Time: 07/02/19 11:17 AM   Result Value Ref Range    Glucose (POC) 138 (H) 70 - 110 mg/dL       ADDITIONAL INFORMATION: If you experience any of the following symptoms but not limited to Fever, chills, nausea, vomiting, diarrhea, change in mentation, falling, bleeding, shortness of breath, chest pain, please call your primary care physician or return to the emergency room if you cannot get hold of your doctor:     FOLLOW UP CARE:  Follow-up with 1. Physician at SNF in 1-2 days with Cbc with diff, bmp, mg.        Condition: Stable  Follow-up Appointments: Follow-up Appointments   Procedures    FOLLOW UP VISIT Appointment in: Other ([facility]) 1. Follow up with PCP Dr. Padmini Bryant in 5-7 days. If cough not improved in 2 weeks, patient will need CT chest.     1. Follow up with PCP Dr. Padmini Bryant in 5-7 days. If cough not improved in 2 weeks, patient will need CT chest.     Standing Status:   Standing     Number of Occurrences:   1     Order Specific Question:   Appointment in     Answer:    Other (Specify)       >30 minutes spent coordinating this discharge (review instructions/follow-up, prescriptions, preparing report for sign off)    Signed:  Tanja Moss NP  7/2/2019  4:07 PM

## 2019-07-02 NOTE — PROGRESS NOTES
Discharge order noted for today. Pt has been accepted to [facility] agency. Met with patient and daughter Ирина Martinez and are agreeable to the transition plan today. Transport has been arranged through daughter. Patient's discharge summary and home health  orders have been forwarded to [facility] home health  agency via que. Updated bedside RN,  to the transition plan. Discharge information has been documented on the AVS.     Patient plans to stay with daughter for a day or two following d/c today. Daughter's number is [phone], best number to reach patient. Patient would not like to start home health services until pt is back at home in a few days. [facility], at that time pt can be reached at [phone].       REDD Valenzuela  Case Management  [phone]

## 2019-07-03 VITALS
TEMPERATURE: 97.4 F | HEIGHT: 64 IN | BODY MASS INDEX: 28.44 KG/M2 | SYSTOLIC BLOOD PRESSURE: 123 MMHG | DIASTOLIC BLOOD PRESSURE: 74 MMHG | OXYGEN SATURATION: 95 % | HEART RATE: 77 BPM | WEIGHT: 166.6 LBS | RESPIRATION RATE: 18 BRPM

## 2019-07-03 LAB
BACTERIA SPEC CULT: ABNORMAL
GLUCOSE BLD STRIP.AUTO-MCNC: 109 MG/DL (ref 70–110)
GLUCOSE BLD STRIP.AUTO-MCNC: 170 MG/DL (ref 70–110)
GLUCOSE BLD STRIP.AUTO-MCNC: 92 MG/DL (ref 70–110)
GRAM STN SPEC: ABNORMAL
GRAM STN SPEC: ABNORMAL
SERVICE CMNT-IMP: ABNORMAL

## 2019-07-03 PROCEDURE — 74011250637 HC RX REV CODE- 250/637: Performed by: NURSE PRACTITIONER

## 2019-07-03 PROCEDURE — 94640 AIRWAY INHALATION TREATMENT: CPT

## 2019-07-03 PROCEDURE — 74011250636 HC RX REV CODE- 250/636: Performed by: HOSPITALIST

## 2019-07-03 PROCEDURE — 74011000250 HC RX REV CODE- 250: Performed by: EMERGENCY MEDICINE

## 2019-07-03 PROCEDURE — 74011000250 HC RX REV CODE- 250: Performed by: INTERNAL MEDICINE

## 2019-07-03 PROCEDURE — 77030027138 HC INCENT SPIROMETER -A

## 2019-07-03 PROCEDURE — 97116 GAIT TRAINING THERAPY: CPT

## 2019-07-03 PROCEDURE — 74011250637 HC RX REV CODE- 250/637: Performed by: HOSPITALIST

## 2019-07-03 PROCEDURE — 74011636637 HC RX REV CODE- 636/637: Performed by: HOSPITALIST

## 2019-07-03 PROCEDURE — 74011250637 HC RX REV CODE- 250/637: Performed by: INTERNAL MEDICINE

## 2019-07-03 PROCEDURE — 74011250636 HC RX REV CODE- 250/636: Performed by: EMERGENCY MEDICINE

## 2019-07-03 PROCEDURE — 94761 N-INVAS EAR/PLS OXIMETRY MLT: CPT

## 2019-07-03 PROCEDURE — 74011250636 HC RX REV CODE- 250/636: Performed by: NURSE PRACTITIONER

## 2019-07-03 PROCEDURE — 82962 GLUCOSE BLOOD TEST: CPT

## 2019-07-03 RX ORDER — SODIUM CHLORIDE FOR INHALATION 3 %
4 VIAL, NEBULIZER (ML) INHALATION 2 TIMES DAILY
Status: DISCONTINUED | OUTPATIENT
Start: 2019-07-03 | End: 2019-07-03 | Stop reason: HOSPADM

## 2019-07-03 RX ORDER — ALBUTEROL SULFATE 0.83 MG/ML
2.5 SOLUTION RESPIRATORY (INHALATION)
Status: DISCONTINUED | OUTPATIENT
Start: 2019-07-03 | End: 2019-07-03 | Stop reason: HOSPADM

## 2019-07-03 RX ORDER — FLUTICASONE FUROATE AND VILANTEROL 100; 25 UG/1; UG/1
1 POWDER RESPIRATORY (INHALATION) DAILY
Qty: 1 INHALER | Refills: 0 | Status: SHIPPED | OUTPATIENT
Start: 2019-07-03 | End: 2019-08-26

## 2019-07-03 RX ADMIN — ASPIRIN 81 MG 81 MG: 81 TABLET ORAL at 08:13

## 2019-07-03 RX ADMIN — METOPROLOL TARTRATE 200 MG: 50 TABLET ORAL at 08:13

## 2019-07-03 RX ADMIN — LISINOPRIL 40 MG: 10 TABLET ORAL at 08:13

## 2019-07-03 RX ADMIN — HEPARIN SODIUM 5000 UNITS: 5000 INJECTION INTRAVENOUS; SUBCUTANEOUS at 13:11

## 2019-07-03 RX ADMIN — ALBUTEROL SULFATE 2.5 MG: 2.5 SOLUTION RESPIRATORY (INHALATION) at 14:07

## 2019-07-03 RX ADMIN — FAMOTIDINE 20 MG: 20 TABLET, FILM COATED ORAL at 08:14

## 2019-07-03 RX ADMIN — INSULIN LISPRO 2 UNITS: 100 INJECTION, SOLUTION INTRAVENOUS; SUBCUTANEOUS at 13:11

## 2019-07-03 RX ADMIN — CLONIDINE HYDROCHLORIDE 0.2 MG: 0.1 TABLET ORAL at 17:17

## 2019-07-03 RX ADMIN — CEFTRIAXONE SODIUM 2 G: 2 INJECTION, POWDER, FOR SOLUTION INTRAMUSCULAR; INTRAVENOUS at 13:11

## 2019-07-03 RX ADMIN — PRAVASTATIN SODIUM 40 MG: 20 TABLET ORAL at 08:14

## 2019-07-03 RX ADMIN — GUAIFENESIN 1200 MG: 600 TABLET, EXTENDED RELEASE ORAL at 08:13

## 2019-07-03 RX ADMIN — VERAPAMIL HYDROCHLORIDE 180 MG: 180 TABLET, FILM COATED, EXTENDED RELEASE ORAL at 13:12

## 2019-07-03 RX ADMIN — ALBUTEROL SULFATE 2.5 MG: 2.5 SOLUTION RESPIRATORY (INHALATION) at 07:34

## 2019-07-03 RX ADMIN — BUDESONIDE 500 MCG: 0.5 INHALANT RESPIRATORY (INHALATION) at 07:33

## 2019-07-03 RX ADMIN — SODIUM CHLORIDE SOLN NEBU 3% 4 ML: 3 NEBU SOLN at 18:23

## 2019-07-03 RX ADMIN — ALBUTEROL SULFATE 2.5 MG: 2.5 SOLUTION RESPIRATORY (INHALATION) at 03:30

## 2019-07-03 RX ADMIN — AZITHROMYCIN MONOHYDRATE 500 MG: 500 INJECTION, POWDER, LYOPHILIZED, FOR SOLUTION INTRAVENOUS at 13:39

## 2019-07-03 RX ADMIN — CLONIDINE HYDROCHLORIDE 0.2 MG: 0.1 TABLET ORAL at 08:13

## 2019-07-03 NOTE — PROGRESS NOTES
Problem: Mobility Impaired (Adult and Pediatric)  Goal: *Acute Goals and Plan of Care (Insert Text)  Description  Physical Therapy Goals  Initiated 7/2/2019 and to be accomplished within 7 day(s)     1. Patient will transfer from bed to chair and chair to bed with independence using the least restrictive device. 2.  Patient will perform sit to stand with independence. 3.  Patient will ambulate with independence for 200 feet with the least restrictive device. 4.  Patient will ascend/descend 1 step with no handrail(s) with supervision/set-up. To prepare pt for home mobility. PLOF:  Pt lives alone in a 1 story apartment with 1 step to enter. Pt has a daughter and son who stop by frequently. Prior to this hospitalization , pt had been ambulating independently in her apartment and to [place] with no AD. Outcome: Progressing Towards Goal    PHYSICAL THERAPY TREATMENT    Patient: Elizabeth Alfred (26 y.o. female)  Date: 7/3/2019  Diagnosis: CAP (community acquired pneumonia) [J18.9]  Hypoxia [R09.02] CAP (community acquired pneumonia)       Precautions:    PLOF: see above    ASSESSMENT:  Pt cleared for PT treatment per nursing; RN stating pt has been more unsteady today  compared to yesterday. Pt received sitting EOB, daughter at bedside, agreeable to PT session. Pt performed sit<>stands with SUPV and appropriate safety awareness. Pt ambulated 200 ft in [place]cock with no AD, SBA as she demos mild path deviations secondary to looking around the unit. Pt demos good static standing balance >2 min with SUPV to have a conversation with a visitor. Pt had increased coughing post session, vital signs WFL. Pt left sitting EOB, daughter at beside, and all needs met/within reach. Progression toward goals:   ?      Improving appropriately and progressing toward goals  ? Improving slowly and progressing toward goals  ?       Not making progress toward goals and plan of care will be adjusted PLAN:  Patient continues to benefit from skilled intervention to address the above impairments. Continue treatment per established plan of care. Discharge Recommendations:  None  Further Equipment Recommendations for Discharge:  N/A     SUBJECTIVE:   Patient stated ? I guess I'll go on and do what you want. ?    OBJECTIVE DATA SUMMARY:   Critical Behavior:  Neurologic State: Alert  Orientation Level: Oriented X4  Cognition: Follows commands, Decreased attention/concentration, Appropriate safety awareness  Safety/Judgement: Fall prevention  Functional Mobility Training:  Bed Mobility:  Pt sitting EOB upon arrival.   Transfers:  Sit to Stand: Supervision  Stand to Sit: Supervision  Balance:  Sitting: Intact  Standing: Intact  Standing - Static: Good  Standing - Dynamic : Fair(+)  Ambulation/Gait Training:  Distance (ft): 200 Feet (ft)  Assistive Device: (No AD)  Ambulation - Level of Assistance: Stand-by assistance  Gait Abnormalities: Decreased step clearance; Path deviations; Other(ER legs)  Base of Support: Widened  Speed/Jennifer: Slow    Pain:  Pain level pre-treatment: 0/10  Pain level post-treatment: 0/10     Activity Tolerance:   Good-, secondary to cough and SOB upon exertion  Please refer to the flowsheet for vital signs taken during this treatment. After treatment:   ? Patient left in no apparent distress sitting up in chair  ? Patient left in no apparent distress in bed: sitting EOB  ? Call bell left within reach  ? Nursing notified  ? Caregiver present: daughter at bedside  ? Bed alarm activated  ? SCDs applied      COMMUNICATION/EDUCATION:   ?         Role of Physical Therapy in the acute care setting. ?         Fall prevention education was provided and the patient/caregiver indicated understanding. ? Patient/family have participated as able in working toward goals and plan of care. ?         Patient/family agree to work toward stated goals and plan of care.   ?         Patient understands intent and goals of therapy, but is neutral about his/her participation. ? Patient is unable to participate in stated goals/plan of care: ongoing with therapy staff.   ?         Other:        Christiano Novak, NICOLAS   Time Calculation: 12 mins

## 2019-07-03 NOTE — PROGRESS NOTES
Bedside shift change report given to Lavell Sierra RN (oncoming nurse) by Caity Olivarez RN (offgoing nurse). Report included the following information SBAR, Kardex and MAR.

## 2019-07-03 NOTE — CONSULTS
[facility]  Pulmonary, Critical Care, and Sleep Medicine    Name: Ron Russell MRN: [id number]   : 1934 Hospital: [address] [place] [address]   Date: 7/3/2019        Pulmonary Medicine- Initial Patient Consult      IMPRESSION:   · Pneumonia- RML- clinically improving on therapy- possible endobronchial process- no clear mass on CT. As patient is currently improving would not pursue bronchoscopy at this time. Would treat with a course of antibiotics and monitor for response. I have discussed with the patient the need for follow up with me as an OP and to repeat CXR, if no improvement then we would need to discuss bronchoscopy with airway inspection and possible biopsy if indicated. The patient and daughter expressed agreement with this plan  ·   · Cough - most likely related to infiltrate- may have asthma component. Patient is also on an ACE-inhibitor  ·   · Wheezing- all lung fields- no know history of asthma- does report coughing up some mucus plugs  ·   · Dyspnea >1 year with 1 week of acute worsening  ·   · Hypertension-On several agents, including beta blcoker  ·   · Hyperlipidemia  ·       RECOMMENDATIONS:   · Keep HOB elevated  · SpO2 goal: >92%  · Continue Singulair 10mg Q day- Continue as OP  · Start Breo at time of discharge  · Continue with Pepcid as OP  · Albuterol MDI 2-4 puffs PRN at time of discharge  · Continue DuoNeb Q 6  · Continue with Pulmicort  · Start a trial of 3% hypertonic Neb to see if this aids with expectoration  · Consider changing out lisinopril for an ARB or other agent  · Continue with mucinex  · Agree with current antibiotics  · Plan for OP follow up  · Ambulate patient prior to discharge to assess SpO2     Subjective/History: This patient has been seen and evaluated at the request of Dr. Akosua Van for pneumonia. Patient is a 80 y.o. female who was admitted to the hospital last  for dyspnea and pneumonia.     The patient reports more than one year of dyspnea which worsened about one week ago. Patient with increased work of breathing. CXR notable for RML pneumonia. Admitted for IV antibiotics. CT scan notable for RML with atelectasis and possible endobronchial process    At the time of my evaluation the patients adult daughter was present. Both feel that the patient is clinically improving. No hemoptysis. She does report routinely coughing up clear mucus with what sounds like round mucus plugs    She denies any B-Symptoms, no early satiety. No night sweats. No unintentional weight loss     She denies chocking or any aspiration events. She reports that she is careful with her food and swallowing. No fever or chills. No bladder/bowel changes. No chest pain, + intermittent cough. Past Medical History:   Diagnosis Date    Bronchitis     Diabetes ([address]     Hyperlipemia     Hypertension       Past Surgical History:   Procedure Laterality Date    COLONOSCOPY N/A 6/7/2017    COLONOSCOPY / polypectomy performed by Aleksey Camacho MD at [facility] ENDOSCOPY    HX CATARACT REMOVAL      HX COLONOSCOPY      2011      Prior to Admission medications    Medication Sig Start Date End Date Taking? Authorizing Provider   loperamide (IMODIUM) 2 mg capsule Take 1 Cap by mouth every four (4) hours as needed for Diarrhea for up to 10 days. 7/2/19 7/12/19 Yes Clint Gonzalez NP   guaiFENesin ER (MUCINEX) 1,200 mg Ta12 ER tablet Take 1 Tab by mouth every twelve (12) hours for 5 days. 7/2/19 7/7/19 Yes Clint Gonzalez NP   famotidine (PEPCID) 20 mg tablet Take 1 Tab by mouth daily. 7/3/19  Yes Nino Mckeon, NP   albuterol (PROVENTIL HFA, VENTOLIN HFA, PROAIR HFA) 90 mcg/actuation inhaler Take 2 Puffs by inhalation every six (6) hours as needed for Wheezing. 7/2/19  Yes Clint Gonzalez NP   azithromycin (ZITHROMAX) 500 mg tab Take 1 Tab by mouth daily for 3 days.  7/2/19 7/5/19 Yes Clint Gonzalez NP   amoxicillin-clavulanate (AUGMENTIN) 875-125 mg per tablet Take 1 Tab by mouth every twelve (12) hours for 7 days. 7/2/19 7/9/19 Yes Mal Guerrero NP   aspirin 81 mg chewable tablet Take 81 mg by mouth daily. Linda Cruz MD   CETIRIZINE HCL (CETIRIZINE PO) Take  by mouth. Provider, Historical   MONTELUKAST SODIUM (SINGULAIR PO) Take  by mouth. Provider, Historical   fluticasone-salmeterol (ADVAIR DISKUS) 100-50 mcg/dose diskus inhaler Take 1 Puff by inhalation every twelve (12) hours. Provider, Historical   NEBULIZER by Does Not Apply route. Provider, Historical   Cholecalciferol, Vitamin D3, (VITAMIN D3) 1,000 unit cap Take  by mouth. Linda Cruz MD   cloNIDine (CATAPRESS) 0.2 mg tablet Take 0.2 mg by mouth two (2) times a day. Linda Cruz MD   glimepiride (AMARYL) 4 mg tablet Take 4 mg by mouth daily. Linda Cruz MD   lisinopril (PRINIVIL, ZESTRIL) 20 mg tablet Take 40 mg by mouth daily. Linda Cruz MD   metoprolol (LOPRESSOR) 100 mg tablet Take 200 mg by mouth daily. Linda Cruz MD   pravastatin (PRAVACHOL) 40 mg tablet Take 40 mg by mouth daily. Linda Cruz MD   verapamil SR (CALAN-SR) 180 mg CR tablet Take 180 mg by mouth daily.       Linda Cruz MD     Current Facility-Administered Medications   Medication Dose Route Frequency    albuterol (PROVENTIL VENTOLIN) nebulizer solution 2.5 mg  2.5 mg Nebulization Q6H RT    sodium chloride 3% hypertonic nebulizer soln  4 mL Nebulization BID    cloNIDine HCl (CATAPRES) tablet 0.2 mg  0.2 mg Oral BID    insulin lispro (HUMALOG) injection   SubCUTAneous AC&HS    verapamil ER (CALAN-SR) tablet 180 mg  180 mg Oral DAILY    heparin (porcine) injection 5,000 Units  5,000 Units SubCUTAneous Q8H    famotidine (PEPCID) tablet 20 mg  20 mg Oral DAILY    budesonide (PULMICORT) 500 mcg/2 ml nebulizer suspension  500 mcg Nebulization BID RT    cefTRIAXone (ROCEPHIN) 2 g in sterile water (preservative free) 20 mL IV syringe  2 g IntraVENous Q24H  guaiFENesin ER (MUCINEX) tablet 1,200 mg  1,200 mg Oral Q12H    azithromycin (ZITHROMAX) 500 mg in  mL  500 mg IntraVENous Q24H    aspirin chewable tablet 81 mg  81 mg Oral DAILY    loratadine (CLARITIN) tablet 10 mg  10 mg Oral QHS    lisinopril (PRINIVIL, ZESTRIL) tablet 40 mg  40 mg Oral DAILY    metoprolol tartrate (LOPRESSOR) tablet 200 mg  200 mg Oral DAILY    montelukast (SINGULAIR) tablet 10 mg  10 mg Oral QHS    pravastatin (PRAVACHOL) tablet 40 mg  40 mg Oral DAILY     No Known Allergies   Social History     Tobacco Use    Smoking status: Never Smoker    Smokeless tobacco: Never Used   Substance Use Topics    Alcohol use: No      Family History   Problem Relation Age of Onset    Heart Disease Mother     Diabetes Father     Breast Cancer Daughter         Review of Systems:  Pertinent items are noted in HPI. Objective:   Vital Signs:    Visit Vitals  /74 (BP 1 Location: Left arm, BP Patient Position: Sitting)   Pulse 77   Temp 97.4 °F (36.3 °C)   Resp 18   Ht 5' 4\" (1.626 m)   Wt 75.6 kg (166 lb 9.6 oz)   SpO2 95%   BMI 28.60 kg/m²       O2 Device: Room air   O2 Flow Rate (L/min): 0 l/min   Temp (24hrs), Av.2 °F (36.8 °C), Min:97.4 °F (36.3 °C), Max:99.2 °F (37.3 °C)       Intake/Output:   Last shift:      701 -  190  In: -   Out: 700 [Urine:700]  Last 3 shifts: 1901 -  0700  In: 0   Out: 1900 [Urine:1900]    Intake/Output Summary (Last 24 hours) at 7/3/2019 1641  Last data filed at 7/3/2019 0919  Gross per 24 hour   Intake 0 ml   Output 1300 ml   Net -1300 ml            Physical Exam:    General:  Alert, cooperative, no distress, appears stated age. Head:  Normocephalic, without obvious abnormality, atraumatic. Eyes:  Conjunctivae/corneas clear. PERRL, EOMs intact. Nose: Nares normal. Septum midline. Mucosa normal. No drainage or sinus tenderness.    Throat: Lips, mucosa, and tongue normal. No exudates   Neck: Supple, symmetrical, trachea midline, no adenopathy, thyroid: no enlargment/tenderness/nodules, no carotid bruit and no JVD. Back:   Symmetric, no curvature. ROM normal.   Lungs:   Diffuse- mild inspiratory and expiratory wheezing   Chest wall:  No tenderness or deformity. Heart:  Regular rate and rhythm, S1, S2 normal, no murmur, click, rub or gallop. Abdomen:   Soft, non-tender. Bowel sounds normal. No masses,  No organomegaly. Extremities: Extremities normal, atraumatic, no cyanosis or edema. Pulses: 2+ and symmetric all extremities. Skin: Skin color, texture, turgor normal. No rashes or lesions   Lymph nodes:      Cervical, supraclavicular - LN unremarkable   Neurologic: Grossly nonfocal       Data:     Recent Results (from the past 24 hour(s))   CULTURE, BLOOD    Collection Time: 07/02/19  4:51 PM   Result Value Ref Range    Special Requests: NO SPECIAL REQUESTS      Culture result: NO GROWTH AFTER 13 HOURS     CULTURE, BLOOD    Collection Time: 07/02/19  4:54 PM   Result Value Ref Range    Special Requests: NO SPECIAL REQUESTS      Culture result: NO GROWTH AFTER 13 HOURS     GLUCOSE, POC    Collection Time: 07/02/19  8:03 PM   Result Value Ref Range    Glucose (POC) 100 70 - 110 mg/dL   GLUCOSE, POC    Collection Time: 07/03/19  7:47 AM   Result Value Ref Range    Glucose (POC) 92 70 - 110 mg/dL   GLUCOSE, POC    Collection Time: 07/03/19 10:33 AM   Result Value Ref Range    Glucose (POC) 170 (H) 70 - 110 mg/dL   GLUCOSE, POC    Collection Time: 07/03/19  3:59 PM   Result Value Ref Range    Glucose (POC) 109 70 - 110 mg/dL           No results for input(s): FIO2I, IFO2, HCO3I, IHCO3, HCOPOC, PCO2I, PCOPOC, IPHI, PHI, PHPOC, PO2I, PO2POC in the last 72 hours.     No lab exists for component: IPOC2       Imaging:  I have personally reviewed the patients radiographs and have reviewed the reports:      CXR Results  (Last 48 hours)    None        CT Results (most recent):            Results from [facility] Encounter encounter on 06/30/19   CT CHEST W CONT    Narrative EXAM: CT CHEST W CONT    CLINICAL HISTORY/INDICATIONS: dyspnea    TECHNIQUE:  Contiguous  5 mm axial images were obtained from thoracic inlet  through the adrenal glands after uneventful IV contrast administration. Contrast Used: 75 cc Isovue 300    CT scans at this facility are performed using dose optimization technique as  appropriate with performed exam, to include automated exposure control,  adjustment of mA and/or kV according to patient's size (including appropriate  matching for site-specific examinations), or use of iterative reconstruction  technique. COMPARISON: Chest x-ray 6/30/2019, CT scan chest report 10/24/2013    FINDINGS:  Mediastinum:  Abundant CAD noted. Thoracic aorta density ASCVD was otherwise  normal caliber without aneurysm or dissection. No obvious large pulmonary  embolus identified. No significant pleural effusion. Small 7 mm pretracheal  mediastinal node is noted. There are scattered calcified mediastinal and hilar  lymph nodes likely from old granulomatous disease. Lungs: There is atelectasis of the right middle lobe with collapse air  bronchograms and volume loss. There is partial filling defect identified in  right lower lobe bronchus at the branch point of the right middle lobe and right  lower lobe bronchus. Measures approximately 7 x 5 x 7 mm. This may be  contributing to the atelectasis of right middle lobe. Cannot confirm mass. Possibility of mucosal debris not excluded. Consider bronchoscopy to evaluate  this patient further. Left lung remains clear. Chest wall/ soft tissues:  Unremarkable    Upper abdomen: There is a nodule in the left adrenal gland measuring 2.1 x 1.4  cm. Etiology uncertain. Cannot confirm as an adenoma. Bones:  Degenerative change of the thoracic spine with old compression fracture  of the T11 vertebra again noted.           Impression IMPRESSION[de-identified]    1.  Consolidative atelectasis of right middle lobe with volume loss and  collapse. Air bronchograms are present. 2. Heterogeneous filling defect in the  right lower lobe bronchus at the  bifurcation of right middle lobe bronchus. Cannot confirm mass. Possible mucus  or debris. Consider bronchoscopy to evaluate this further. 3. ASCVD. CAD. 4.  2.1 cm left adrenal nodule of uncertain etiology.     Note: Report called to floor at 8:55 AM on 7/3/2019  Thank you for this referral.                Total clinical care time exclusive of procedures: 40 minutes- Discussed with primary team

## 2019-07-03 NOTE — PROGRESS NOTES
Pt discharge instructions and prescriptions given to pt and pts daughter, both stated understanding. Pt IV was d/c'd with tip still intact. Pt personal belongings gathered and given to pts daughter. Pt wheeled down to front lobby via wheelchair and discharged to daughter in stable condition.

## 2019-07-04 ENCOUNTER — HOME CARE VISIT (OUTPATIENT)
Dept: HOME HEALTH SERVICES | Facility: HOME HEALTH | Age: 84
End: 2019-07-04

## 2019-07-04 NOTE — DISCHARGE SUMMARY
[facility]  [facility]       Patient: Ron Rusesll Age: 80 y.o. : 1934 MR#: [id number] SSN: [id number]  PCP on record: Jacob Yusuf MD  Admit date: 2019  Discharge date: 2019    Disposition:    []Home   [x]Home with Home Health   []SNF/NH   []Rehab   []Home with family   []Alternate Facility:____________________    Admission Diagnoses:  CAP (community acquired pneumonia) [J18.9]  Hypoxia [R09.02]    Discharge Diagnoses:            1. Hypoxia d/t CAP. Hypoxia now resolved. 2. CAP  3. Cough suspect multifactorial d/t CAP, GERD. If unresolved in 2 weeks, PCP to f/u with CT chest   4. Essential HTN  5. DM2  6. HLD  7. GERD  8. Coagulase negative staphylococci Bacteremia X 1 aerobic bottle. Suspect contamination. Discharge Medications:     Current Discharge Medication List      START taking these medications    Details          guaiFENesin ER (MUCINEX) 1,200 mg Ta12 ER tablet Take 1 Tab by mouth every twelve (12) hours for 5 days. Qty: 10 Tab, Refills: 0      famotidine (PEPCID) 20 mg tablet Take 1 Tab by mouth daily. Qty: 60 Tab, Refills: 0      albuterol (PROVENTIL HFA, VENTOLIN HFA, PROAIR HFA) 90 mcg/actuation inhaler Take 2 Puffs by inhalation every six (6) hours as needed for Wheezing. Qty: 1 Inhaler, Refills: 0      azithromycin (ZITHROMAX) 500 mg tab Take 1 Tab by mouth daily for 3 days. Qty: 3 Tab, Refills: 0      amoxicillin-clavulanate (AUGMENTIN) 875-125 mg per tablet Take 1 Tab by mouth every twelve (12) hours for 7 days. Qty: 14 Tab, Refills: 0         CONTINUE these medications which have NOT CHANGED    Details   aspirin 81 mg chewable tablet Take 81 mg by mouth daily. CETIRIZINE HCL (CETIRIZINE PO) Take  by mouth. MONTELUKAST SODIUM (SINGULAIR PO) Take  by mouth.       fluticasone-salmeterol (ADVAIR DISKUS) 100-50 mcg/dose diskus inhaler Take 1 Puff by inhalation every twelve (12) hours. NEBULIZER by Does Not Apply route. Cholecalciferol, Vitamin D3, (VITAMIN D3) 1,000 unit cap Take  by mouth.      cloNIDine (CATAPRESS) 0.2 mg tablet Take 0.2 mg by mouth two (2) times a day. glimepiride (AMARYL) 4 mg tablet Take 4 mg by mouth daily. lisinopril (PRINIVIL, ZESTRIL) 20 mg tablet Take 40 mg by mouth daily. metoprolol (LOPRESSOR) 100 mg tablet Take 200 mg by mouth daily. pravastatin (PRAVACHOL) 40 mg tablet Take 40 mg by mouth daily. verapamil SR (CALAN-SR) 180 mg CR tablet Take 180 mg by mouth daily. Consults:    - PT/OT/SLP  - Case Management     Procedures:  -   NONE    Significant Diagnostic Studies:   -  Study Result     CHEST PA AND LATERAL:     INDICATIONS: Cough and wheezing     COMPARISONS: 2/2/2019     FINDINGS:      Lungs: Newly developed right middle lobe atelectasis.      Cardiac silhouette and mediastinal contours: Normal.     Pleural spaces: No pneumothorax or pleural effusion evident.     Bones and soft tissues: Chronic lower thoracic compression deformity.     Support lines/catheters: None.     IMPRESSION  IMPRESSION:         Newly developed atelectatic right middle lobe. Hospital Course by Problem   HPI per admitting MD  \"Rose Marie Ortiz is a 80 y.o. female with a history of diabetes, hypertension, and hyperlipidemia. She has not been hospitalized since she gave childbirth 53 years ago. Patient developed increased cough and shortness of breath the past 3 days and today started wheezing and came to the ER at [facility]. In the ED Ms. Bacilio Hammonds was found to have an increased oxygen requirement, wheezing and shortness of breath. Chest film with new RML infiltrate. She had lactic acid 0.55. She is admitted for Community acquired pneumonia. \"    1. Hypoxia d/t CAP. In patient with subacute cough suspect multifactorial d/t CAP, GERD.  Patient was initiated on IV abx, nebs, bronchial hygiene protocol and showed improvement. She was subsequently weaned off oxygen with oxygenation maintained >95% on room air, no SOB. Acute vs subacute cough was treated with robitussin syrup, mucinex. Patient education provided to patient and daughter specifically if cough does not improve within 2 weeks. On 7/2 patient was to be discharged however during 6 minute walk, she became dyspneic, unable to complete sentences. No use of accessory muscles. Oxygenation maintained at 90% on room air. HR increased to 120-130s. with bilateral wheeze on exam. Stat CT chest, Stat RT for nebs ordered. CT showed consolidative atelectasis of right middle lobe with volume loss and collapse. Air bronchograms are present. Heterogeneous filling defect in the  right lower lobe bronchus at the bifurcation of right middle lobe bronchus. Cannot confirm mass. Possible mucus or debris. Pulmonology was consulted, per pulm note, no clear mass on CT, will not pursue bronchoscopy at this time as patient is currently improving with treatment course. No further inpatient interventions warranted. Exam otherwise reassuring. Discharged home with Singulair, Breo, Pepcid, Albuterol, mucinex, oral ABX, HHC. F/u with PCP in 5-7 days. F/u pulm in 1 week. 2. Essential HTN- patient was resumed on home regimen medications including clonidine at time of discharge. BP noted to respond appropriately, no further inpatient interventions warranted. Exam otherwise reassuring. OP f/u PCP in 5-7 days. 3. DM2. No acute issues. Initiated on SSI during this admission. May resume oral management at home. OP F/U PCP. 4. HLD- STATIN     5. GERD. Initiated on Pepcid. No further inpatient interventions warranted. OP f/u PCP. 6. Coagulase negative staphylococci Bacteremia X 1 aerobic bottle. Suspect contamination. Patient non toxic appearing. VS stable. States feels much better, ambulating about room. Tolerating oral diet. Repeat blood cultures NGTD.  No further inpatient interventions warranted. Will follow blood cultures with plan to call daughter pending results if warranted. Daughter verbalized understanding of plan. Today's examination of the patient revealed:     Subjective:   Alert and oriented. Sitting in bedside chair, cough improved. Daughter at bedside. Reporting patient had X 1 episode nauseas s/p IV hydralazine. No vomiting. Tolerating meals. RN reported X 1 episode diarrhea. No fevers, chills, abd pain. PNR imodium added to regimen. Objective:   VS:   Visit Vitals  /74 (BP 1 Location: Left arm, BP Patient Position: Sitting)   Pulse 77   Temp 97.4 °F (36.3 °C)   Resp 18   Ht 5' 4\" (1.626 m)   Wt 75.6 kg (166 lb 9.6 oz)   SpO2 95%   BMI 28.60 kg/m²      Tmax/24hrs: Temp (24hrs), Av.7 °F (36.5 °C), Min:97.4 °F (36.3 °C), Max:98 °F (36.7 °C)     Input/Output:     Intake/Output Summary (Last 24 hours) at 2019 0802  Last data filed at 7/3/2019 0919  Gross per 24 hour   Intake    Output 700 ml   Net -700 ml       General:  Alert, NAD  Cardiovascular:  RRR  Pulmonary:  LSC throughout; respiratory effort WNL  GI:  +BS in all four quadrants, soft, non-tender  Extremities:  No edema; 2+ dorsalis pedis pulses bilaterally  Additional:      Labs:    Recent Results (from the past 24 hour(s))   GLUCOSE, POC    Collection Time: 19 10:33 AM   Result Value Ref Range    Glucose (POC) 170 (H) 70 - 110 mg/dL   GLUCOSE, POC    Collection Time: 19  3:59 PM   Result Value Ref Range    Glucose (POC) 109 70 - 110 mg/dL       ADDITIONAL INFORMATION: If you experience any of the following symptoms but not limited to Fever, chills, nausea, vomiting, diarrhea, change in mentation, falling, bleeding, shortness of breath, chest pain, please call your primary care physician or return to the emergency room if you cannot get hold of your doctor:     FOLLOW UP CARE:  Follow-up with 1.  Physician at [facility] in 1-2 days with Cbc with diff, bmp, mg.        Condition: Stable  Follow-up Appointments: Follow-up Appointments   Procedures    FOLLOW UP VISIT Appointment in: Other ([address]) 1. Follow up with PCP Dr. Hitesh Vitale in 5-7 days. If cough not improved in 2 weeks, patient will need CT chest.     1. Follow up with PCP Dr. Hitesh Vitale in 5-7 days. If cough not improved in 2 weeks, patient will need CT chest.     Standing Status:   Standing     Number of Occurrences:   1     Order Specific Question:   Appointment in     Answer: Other (Specify)     2.  Follow up with Pulmonology Dr. Vincent Calvert in 1 week.     >30 minutes spent coordinating this discharge (review instructions/follow-up, prescriptions, preparing report for sign off)    Signed:  Emily Nuñez NP  7/4/2019  4:07 PM

## 2019-07-05 NOTE — HOME CARE
Patient discharged 7/3 in PM, [facility] referral processed on 7/4 by [facility] central intake for [facility] for PNA. ASHUTOSH OCAMPO.

## 2019-07-06 ENCOUNTER — HOME CARE VISIT (OUTPATIENT)
Dept: HOME HEALTH SERVICES | Facility: HOME HEALTH | Age: 84
End: 2019-07-06

## 2019-07-06 LAB
BACTERIA SPEC CULT: NORMAL
SERVICE CMNT-IMP: NORMAL

## 2019-07-08 ENCOUNTER — HOME CARE VISIT (OUTPATIENT)
Dept: SCHEDULING | Facility: HOME HEALTH | Age: 84
End: 2019-07-08

## 2019-07-08 LAB
BACTERIA SPEC CULT: NORMAL
BACTERIA SPEC CULT: NORMAL
SERVICE CMNT-IMP: NORMAL
SERVICE CMNT-IMP: NORMAL

## 2019-07-08 PROCEDURE — G0299 HHS/HOSPICE OF RN EA 15 MIN: HCPCS

## 2019-07-11 ENCOUNTER — OFFICE VISIT (OUTPATIENT)
Dept: PULMONOLOGY | Age: 84
End: 2019-07-11

## 2019-07-11 VITALS
RESPIRATION RATE: 18 BRPM | HEART RATE: 72 BPM | TEMPERATURE: 98.5 F | DIASTOLIC BLOOD PRESSURE: 54 MMHG | WEIGHT: 168.5 LBS | HEIGHT: 64 IN | BODY MASS INDEX: 28.77 KG/M2 | OXYGEN SATURATION: 95 % | SYSTOLIC BLOOD PRESSURE: 108 MMHG

## 2019-07-11 DIAGNOSIS — J45.20 MILD INTERMITTENT REACTIVE AIRWAY DISEASE WITHOUT COMPLICATION: ICD-10-CM

## 2019-07-11 DIAGNOSIS — J18.9 COMMUNITY ACQUIRED PNEUMONIA OF RIGHT MIDDLE LOBE OF LUNG: Primary | ICD-10-CM

## 2019-07-11 DIAGNOSIS — I10 ESSENTIAL HYPERTENSION: ICD-10-CM

## 2019-07-11 DIAGNOSIS — J98.11 ATELECTASIS: ICD-10-CM

## 2019-07-11 RX ORDER — FLUTICASONE FUROATE AND VILANTEROL 100; 25 UG/1; UG/1
1 POWDER RESPIRATORY (INHALATION) DAILY
Qty: 1 INHALER | Refills: 0 | Status: SHIPPED | COMMUNITY
Start: 2019-07-11 | End: 2019-08-26 | Stop reason: SDUPTHER

## 2019-07-11 RX ORDER — METOPROLOL SUCCINATE 200 MG/1
TABLET, EXTENDED RELEASE ORAL
COMMUNITY
Start: 2019-06-18 | End: 2019-07-11

## 2019-07-11 NOTE — PROGRESS NOTES
22 Josue Randall presents today for   Chief Complaint   Patient presents with   [facility] Follow Up     from 6/30-7/3/2019 [facility] for Community acquired pneumonia    Other     CXR 6/30/2019, CT 7/2/2019       Is someone accompanying this pt? Yes. Daughter    Is the patient using any DME equipment during [address]? Yes. nebulizer    -DME Company N/A    Depression Screening:  3 most recent PHQ Screens 7/11/2019   Little interest or pleasure in doing things Not at all   Feeling down, depressed, irritable, or hopeless Not at all   Total Score PHQ 2 0       Learning Assessment:  Learning Assessment 11/15/2016   PRIMARY LEARNER Patient   BARRIERS PRIMARY LEARNER NONE   PRIMARY LANGUAGE ENGLISH   LEARNER PREFERENCE PRIMARY DEMONSTRATION     LISTENING   ANSWERED BY self   RELATIONSHIP SELF       Abuse Screening:  Abuse Screening Questionnaire 7/11/2019   Do you ever feel afraid of your partner? N   Are you in a relationship with someone who physically or mentally threatens you? N   Is it safe for you to go home? Y       Fall Risk  Fall Risk Assessment, last 12 mths 7/11/2019   Able to walk? Yes   Fall in past 12 months? No   Fall with injury? -   Number of falls in past 12 months -   Fall Risk Score -         Coordination of Care:  1. Have you been to the ER, urgent care clinic since your last visit? Hospitalized since your last visit? Yes; Where: [facility] ED & [facility], When: 2/2/2019 & 6/30-7/3/2019-Bronchitis, elevated BP reading & Community acquired pneumonia    2. Have you seen or consulted any other health care providers outside of the [address] [place] [address] since your last visit? Include any pap smears or colon screening. Yes.  Dr. Juventino Tinajero, PCP

## 2019-07-11 NOTE — PROGRESS NOTES
[employer] [facility]  Pulmonary, Critical Care, and Sleep Medicine     Office Progress Note- Hospital Follow Up      Primary Care Physician: Nyasia Wise MD     Reason for Visit:  Evaluation for hospital follow up for Community Acquired Pneumonia with right middle lobes atelectasis  DOA: 6/30/19-07/03/19    Assessment:  · Pneumonia- L- clinically improving on therapy- possible endobronchial process- no clear mass on CT. As patient is currently improving would not pursue bronchoscopy at this time. Would treat with a course of antibiotics and monitor for response. I have discussed with the patient the need for follow up with me as an OP and to repeat CXR, if no improvement then we would need to discuss bronchoscopy with airway inspection and possible biopsy if indicated. The patient and daughter expressed agreement with this plan  ·    · Cough - most likely related to infiltrate- may have asthma component. Patient is also on an ACE-inhibitor  ·    · Wheezing- all lung fields- no know history of asthma- does report coughing up some mucus plugs  ·    · Dyspnea >1 year with 1 week of acute worsening  ·    · Hypertension-On several agents, including beta blcoker  ·    · Hyperlipidemia    Plan:  · Start Breo- 2 weeks sample given. If tolerates will continue therapy  · Continue with PRN albuterol  · Continue with mucinex  · Restart Aerobika several times during the jayden  · Continue Singulair 10mg daily  · Inhaler and Valentina education given to patient and daughter. I also told daughter that she could look up videos on line for review if needed. · Further recommendations pending follow up CXR. · Follow up with Primary Care Provider (PCP) as directed and for routine health care maintenance. · Return to clinic after CXR completed or sooner if needed.            History of Present Illness: Ms. Lexa Gaines is a 80 y.o. female patient who presents for follow up of [facility] acquired penumonia. The history was provided by the patient and adult daughter    At the time of hospital patient's pneumonia was also noted to have an area of atelectasis without clear mask. The patient has reports symptoms suggestive of  asthma and mucus plugging. She was discharged with an order for [facility] but patient never got this medication. It was on discharge paperwork. She is taking albuterol. She also took home her [place] Mar[facility] but the patient has not been using. Both daughter and patient were unsure how to use it. Patient reports she has been feeling much better. She is still coughing up thick mucus. No fever or chills. No hemoptysis. No chest pain. She feels that her dyspnea is much improved but not fully resolved. Tolerating PO. No swallowing difficulties. She has seen her OP- PCP who has already ordered a follow up CXR             3 most recent PHQ Screens 7/11/2016   Little interest or pleasure in doing things Not at all   Feeling down, depressed, irritable, or hopeless Not at all   Total Score PHQ 2 0       There is no immunization history for the selected administration types on file for this patient.     Past Medical History:  Past Medical History:   Diagnosis Date    Bronchitis     Diabetes ([facility] [address]     Hyperlipemia     Hypertension        Past Surgical History:  Past Surgical History:   Procedure Laterality Date    COLONOSCOPY N/A 6/7/2017    COLONOSCOPY / polypectomy performed by Meek [facility] MD at [facility] ENDOSCOPY    HX CATARACT REMOVAL      HX COLONOSCOPY      2011       Family History:  Family History   Problem Relation Age of Onset    Heart Disease Mother     Diabetes Father     Breast Cancer Daughter        Social History:  Social History     Tobacco Use    Smoking status: Never Smoker    Smokeless tobacco: Never Used   Substance Use Topics    Alcohol use: No    Drug use: No        Medications:  Current Outpatient Medications on File Prior to Visit   Medication Sig Dispense Refill    guaiFENesin (MUCUS RELIEF ER) 1,200 mg Ta12 ER tablet Take 1,200 mg by mouth two (2) times a day.  fluticasone furoate-vilanterol (BREO ELLIPTA) 100-25 mcg/dose inhaler Take 1 Puff by inhalation daily. 1 Inhaler 0    loperamide (IMODIUM) 2 mg capsule Take 1 Cap by mouth every four (4) hours as needed for Diarrhea for up to 10 days. 60 Cap 0    famotidine (PEPCID) 20 mg tablet Take 1 Tab by mouth daily. 60 Tab 0    albuterol (PROVENTIL HFA, VENTOLIN HFA, PROAIR HFA) 90 mcg/actuation inhaler Take 2 Puffs by inhalation every six (6) hours as needed for Wheezing. 1 Inhaler 0    aspirin 81 mg chewable tablet Take 81 mg by mouth daily.  CETIRIZINE HCL (CETIRIZINE PO) Take 10 mg by mouth nightly.  MONTELUKAST SODIUM (SINGULAIR PO) Take 10 mg by mouth daily.  NEBULIZER by Does Not Apply route.  Cholecalciferol, Vitamin D3, (VITAMIN D3) 1,000 unit cap Take  by mouth.  cloNIDine (CATAPRESS) 0.2 mg tablet Take 0.2 mg by mouth two (2) times a day.  glimepiride (AMARYL) 4 mg tablet Take 2 mg by mouth daily.  lisinopril (PRINIVIL, ZESTRIL) 20 mg tablet Take 40 mg by mouth daily.  metoprolol (LOPRESSOR) 100 mg tablet Take 200 mg by mouth daily.  pravastatin (PRAVACHOL) 40 mg tablet Take 40 mg by mouth daily.  verapamil SR (CALAN-SR) 180 mg CR tablet Take 180 mg by mouth daily. No current facility-administered medications on file prior to visit.          Allergy:  No Known Allergies    Review of Systems  General ROS: positive for  - fatigue but mproved  negative for - chills, fever, hot flashes, malaise, night sweats or sleep disturbance  ENT ROS: negative for - epistaxis, headaches, hearing change, nasal congestion, nasal discharge, nasal polyps, oral lesions, sinus pain, sneezing, sore throat, tinnitus, vertigo, visual changes or vocal changes  Hematological and Lymphatic ROS: negative for - bleeding problems, blood clots, bruising, jaundice, pallor or swollen lymph nodes  Endocrine ROS: negative for - polydipsia/polyuria, skin changes, temperature intolerance or unexpected weight changes  Respiratory ROS: positive for - cough and shortness of breath  negative for - hemoptysis, orthopnea, pleuritic pain, stridor, tachypnea or wheezing  Cardiovascular ROS: no chest pain or dyspnea on exertion  Gastrointestinal ROS: no abdominal pain, change in bowel habits, or black or bloody stools  Genito-Urinary ROS: no dysuria, trouble voiding, or hematuria  Musculoskeletal ROS: positive for - joint stiffness  Neurological ROS: no TIA or stroke symptoms  Dermatological ROS: negative for - pruritus, rash or skin lesion changes   Psychological ROS: negative   Otherwise negative. Physical Exam:     Resp. rate 18, height 5' 4\" (1.626 m), weight 76.4 kg (168 lb 8 oz). on RA, Body mass index is 28.92 kg/m².      General: No distress, appears stated age, pleasant and cooperative  HEENT: PERRL, EOMI, throat without erythema or exudate, Tongue- normal Uvula- midline,   Neck: Supple,  no abnormally enlarged lymph nodes, thyroid is not enlarged, non-tender, No JVD  Chest: normal- no gross abnormalities  Lungs: moderate air entry, clear to auscultation bilaterally, - NO wheezing, no crackles, no rhonchi  Heart:  Regular rate and rhythm, S1S2 present, without murmur  Abdomen: protuberant, bowel sounds normoactive, abdomen is soft without significant tenderness, or guarding  Extremity: negative for edema, cyanosis or clubbing  Skin: Skin color, texture, turgor normal. No rashes or lesions  Neuro: CN2-12 intact, normal muscle tone, alert and oriented    Data Reviewed:     CBC:   Lab Results   Component Value Date/Time    WBC 11.0 07/02/2019 02:45 AM    HGB 13.2 07/02/2019 02:45 AM    HCT 39.8 07/02/2019 02:45 AM    PLATELET 525 84/29/9246 02:45 AM    MCV 81.6 07/02/2019 02:45 AM       BMP:   Lab Results   Component Value Date/Time    Sodium 143 07/02/2019 02:45 AM    Potassium 3.5 07/02/2019 02:45 AM    Chloride 107 07/02/2019 02:45 AM    CO2 30 07/02/2019 02:45 AM    Anion gap 6 07/02/2019 02:45 AM    Glucose 106 (H) 07/02/2019 02:45 AM    BUN 20 (H) 07/02/2019 02:45 AM    Creatinine 1.03 07/02/2019 02:45 AM    BUN/Creatinine ratio 19 07/02/2019 02:45 AM    GFR est AA >60 07/02/2019 02:45 AM    GFR est non-AA 51 (L) 07/02/2019 02:45 AM    Calcium 9.3 07/02/2019 02:45 AM        Imaging:  [x]I have personally reviewed the patients radiographs section   Results from Hospital Encounter encounter on 06/30/19   XR CHEST PA LAT    Narrative CHEST PA AND LATERAL:    INDICATIONS: Cough and wheezing    COMPARISONS: 2/2/2019    FINDINGS:     Lungs: Newly developed right middle lobe atelectasis. Cardiac silhouette and mediastinal contours: Normal.    Pleural spaces: No pneumothorax or pleural effusion evident. Bones and soft tissues: Chronic lower thoracic compression deformity. Support lines/catheters: None. Impression IMPRESSION:        Newly developed atelectatic right middle lobe. Results from [place] encounter on 06/30/19   CT CHEST W CONT    Narrative EXAM: CT CHEST W CONT    CLINICAL HISTORY/INDICATIONS: dyspnea    TECHNIQUE:  Contiguous  5 mm axial images were obtained from thoracic inlet  through the adrenal glands after uneventful IV contrast administration. Contrast Used: 75 cc Isovue 300    CT scans at this facility are performed using dose optimization technique as  appropriate with performed exam, to include automated exposure control,  adjustment of mA and/or kV according to patient's size (including appropriate  matching for site-specific examinations), or use of iterative reconstruction  technique. COMPARISON: Chest x-ray 6/30/2019, CT scan chest report 10/24/2013    FINDINGS:  Mediastinum:  Abundant CAD noted. Thoracic aorta density ASCVD was otherwise  normal caliber without aneurysm or dissection.  No obvious large pulmonary  embolus identified. No significant pleural effusion. Small 7 mm pretracheal  mediastinal node is noted. There are scattered calcified mediastinal and hilar  lymph nodes likely from old granulomatous disease. Lungs: There is atelectasis of the right middle lobe with collapse air  bronchograms and volume loss. There is partial filling defect identified in  right lower lobe bronchus at the branch point of the right middle lobe and right  lower lobe bronchus. Measures approximately 7 x 5 x 7 mm. This may be  contributing to the atelectasis of right middle lobe. Cannot confirm mass. Possibility of mucosal debris not excluded. Consider bronchoscopy to evaluate  this patient further. Left lung remains clear. Chest wall/ soft tissues:  Unremarkable    Upper abdomen: There is a nodule in the left adrenal gland measuring 2.1 x 1.4  cm. Etiology uncertain. Cannot confirm as an adenoma. Bones:  Degenerative change of the thoracic spine with old compression fracture  of the T11 vertebra again noted. Impression IMPRESSION[de-identified]    1.  Consolidative atelectasis of right middle lobe with volume loss and  collapse. Air bronchograms are present. 2. Heterogeneous filling defect in the  right lower lobe bronchus at the  bifurcation of right middle lobe bronchus. Cannot confirm mass. Possible mucus  or debris. Consider bronchoscopy to evaluate this further. 3. ASCVD. CAD. 4.  2.1 cm left adrenal nodule of uncertain etiology. Note: Report called to floor at 8:55 AM on 7/3/2019  Thank you for this referral.          Pulmonary Function Study Trends:  Date FVC (L) % Pred. FeV1(L) % Pred. FeV1/FVC % Pred. TLC (L) % Pred. DLCO % Pred.                                                          6-Minute Walk Summary Trends:  Date Dyspnea Scale Distance (m) %- Su SpO2 Reuben  HR max(bpm) O2  LPM                                    Tami Messina DO, [facility]  Pulmonary, Sleep and Critical Care Medicine

## 2019-07-11 NOTE — LETTER
7/11/19 Patient: [facility] YOB: 1934 Date of Visit: 7/11/2019 241 Oneal Harrison MD 
[address] [place] [address] VIA Facsimile: [phone] Dear 241 Oneal Harrison MD, Thank you for referring Ms. Rose Marie Garrison to [address] [place] [address] for evaluation. My notes for this consultation are attached. If you have questions, please do not hesitate to call me. I look forward to following your patient along with you.  
 
 
Sincerely, 
 
Lonny Damian, DO

## 2019-08-03 ENCOUNTER — HOSPITAL ENCOUNTER (OUTPATIENT)
Dept: GENERAL RADIOLOGY | Age: 84
Discharge: HOME | End: 2019-08-03
Payer: MEDICARE

## 2019-08-03 DIAGNOSIS — J18.9 PNEUMONIA: ICD-10-CM

## 2019-08-03 PROCEDURE — 71046 X-RAY EXAM CHEST 2 VIEWS: CPT

## 2019-08-26 ENCOUNTER — OFFICE VISIT (OUTPATIENT)
Dept: PULMONOLOGY | Age: 84
End: 2019-08-26

## 2019-08-26 VITALS
SYSTOLIC BLOOD PRESSURE: 136 MMHG | HEART RATE: 63 BPM | WEIGHT: 171.2 LBS | RESPIRATION RATE: 16 BRPM | BODY MASS INDEX: 29.23 KG/M2 | OXYGEN SATURATION: 96 % | HEIGHT: 64 IN | TEMPERATURE: 98.1 F | DIASTOLIC BLOOD PRESSURE: 62 MMHG

## 2019-08-26 DIAGNOSIS — J98.11 ATELECTASIS: ICD-10-CM

## 2019-08-26 DIAGNOSIS — R06.2 WHEEZING: ICD-10-CM

## 2019-08-26 DIAGNOSIS — J18.9 COMMUNITY ACQUIRED PNEUMONIA OF RIGHT MIDDLE LOBE OF LUNG: Primary | ICD-10-CM

## 2019-08-26 RX ORDER — LISINOPRIL 40 MG/1
TABLET ORAL
COMMUNITY
Start: 2019-06-18 | End: 2019-08-26

## 2019-08-26 RX ORDER — GLIMEPIRIDE 2 MG/1
TABLET ORAL
COMMUNITY
Start: 2019-06-18 | End: 2019-08-26

## 2019-08-26 RX ORDER — CETIRIZINE HCL 10 MG
TABLET ORAL
Refills: 4 | COMMUNITY
Start: 2019-07-22 | End: 2019-08-26 | Stop reason: SDUPTHER

## 2019-08-26 RX ORDER — VERAPAMIL HYDROCHLORIDE 180 MG/1
CAPSULE, EXTENDED RELEASE ORAL
COMMUNITY
Start: 2019-08-21 | End: 2019-08-26 | Stop reason: SDUPTHER

## 2019-08-26 RX ORDER — MONTELUKAST SODIUM 10 MG/1
TABLET ORAL
COMMUNITY
Start: 2019-07-25 | End: 2019-08-26 | Stop reason: SDUPTHER

## 2019-08-26 NOTE — PROGRESS NOTES
[address] [place] [address] Pulmonary Specialist  Pulmonary, Critical Care, and Sleep Medicine     Office Progress Note      Primary Care Physician: Mata Malhotra MD     Reason for Visit:  Follow up evaluation for  Community Acquired Pneumonia with right middle lobes atelectasis  DOA: 6/30/19-07/03/19    Assessment:  · Pneumonia- RML- resolved on CXR 8/3/19. I have reviewed CXR images with patient and her daughter today. ·    · Cough - now resolved, most likely related to pneumonia with bronchitis. ·    · Wheezing- currently resolving  ·    · Dyspnea - now improved  ·    · Hypertension-On several agents, including beta blcoker  ·    · Hyperlipidemia    Plan:  · Continue to Hold Breo for now  · Continue with PRN albuterol. I discussed with the patient and her daughter about only using PRN and not as a maintenance. We discussed side effects of excess beta agonist use  · Continue with mucinex- PRN  · D/C Singulair 10mg daily and monitor for response. If any dyspnea and/or wheezing return, restart Singulair and call PCP and/or out clinic. · Monitor respiratory symptoms through seasonal changes  · No further Pulmonary follow up needed unless respiratory symptoms return- I encourage patient and daughter to call our clinic if any questions or concerns. · Update immunizations as needed. I dicussed with patient and daughter for patient to get flu shot when available. · Follow up with Primary Care Provider (PCP) as directed and for routine health care maintenance. History of Present Illness: Ms. Elmira Faria is a 80 y.o. female patient who presents for follow up of RML community acquired pneumonia (Hospitalized 6/30-7/3/19)  . The history was provided by the patient and adult daughter    At the time of hospital patient's pneumonia was also noted to have an area of atelectasis without clear evidence of obstructing mass. The patient has reports symptoms suggestive of  asthma and mucus plugging.   At last visit, the patient was noted to have persistent wheezing. Somehow the patient did not get started on Breo after hospital discharge. She was started on Breo at our last clinic visit and given a sample. Once the patient, finished her sample she did not renew that medication. She has been taking PRN albuterol. Upon further discussion, the patient has been taking in the morning and in the evening regardless of symptoms. She has also been taking Singulair 10mg daily and Zyrtec daily. Today she reports she has been feeling much better. She does not have the dyspnea she previously had at time of discharge. Her exercise tolerance has improved. She is able to walk to her [employer] on most Sunday's. If not, someone from [employer] will pick her up. No fever or chills. No audible wheezing noted by patient or her adult daughter. Some scant morning phlegm production. No other constitutional symptoms. Overall she is feeling much better. She had a repeat CXR performed on 8/3/19 which was notable for resolution of infiltrate. 3 most recent PHQ Screens 8/26/2019   Little interest or pleasure in doing things Not at all   Feeling down, depressed, irritable, or hopeless Not at all   Total Score PHQ 2 0       There is no immunization history for the selected administration types on file for this patient.     Past Medical History:  Past Medical History:   Diagnosis Date    Bronchitis     Diabetes ([address]     Hyperlipemia     Hypertension     Pneumonia Jun-Jul 2019       Past Surgical History:  Past Surgical History:   Procedure Laterality Date    COLONOSCOPY N/A 6/7/2017    COLONOSCOPY / polypectomy performed by [facility] at [facility]COPY    HX CATARACT REMOVAL      HX COLONOSCOPY      2011       Family History:  Family History   Problem Relation Age of Onset    Heart Disease Mother     Diabetes Father     Breast Cancer Daughter        Social History:  Social History     Tobacco Use    Smoking status: Never Smoker    Smokeless tobacco: Never Used   Substance Use Topics    Alcohol use: No    Drug use: No        Medications:  Current Outpatient Medications on File Prior to Visit   Medication Sig Dispense Refill    guaiFENesin (MUCUS RELIEF ER) 1,200 mg Ta12 ER tablet Take 1,200 mg by mouth two (2) times a day.  fluticasone furoate-vilanterol (BREO ELLIPTA) 100-25 mcg/dose inhaler Take 1 Puff by inhalation daily. 1 Inhaler 0    famotidine (PEPCID) 20 mg tablet Take 1 Tab by mouth daily. (Patient taking differently: Take 20 mg by mouth daily as needed.) 60 Tab 0    albuterol (PROVENTIL HFA, VENTOLIN HFA, PROAIR HFA) 90 mcg/actuation inhaler Take 2 Puffs by inhalation every six (6) hours as needed for Wheezing. 1 Inhaler 0    aspirin 81 mg chewable tablet Take 81 mg by mouth daily.  CETIRIZINE HCL (CETIRIZINE PO) Take 10 mg by mouth nightly.  MONTELUKAST SODIUM (SINGULAIR PO) Take 10 mg by mouth daily.  NEBULIZER by Does Not Apply route.  Cholecalciferol, Vitamin D3, (VITAMIN D3) 1,000 unit cap Take 1,000 Units by mouth daily.  cloNIDine (CATAPRESS) 0.2 mg tablet Take 0.2 mg by mouth two (2) times a day.  glimepiride (AMARYL) 4 mg tablet Take 2 mg by mouth daily.  lisinopril (PRINIVIL, ZESTRIL) 20 mg tablet Take 40 mg by mouth daily.  metoprolol (LOPRESSOR) 100 mg tablet Take 200 mg by mouth daily.  pravastatin (PRAVACHOL) 40 mg tablet Take 40 mg by mouth daily.  verapamil SR (CALAN-SR) 180 mg CR tablet Take 180 mg by mouth daily.  cetirizine (ZYRTEC) 10 mg tablet TAKE 1 TABLET BY MOUTH AT BEDTIME  4    glimepiride (AMARYL) 2 mg tablet       lisinopril (PRINIVIL, ZESTRIL) 40 mg tablet       montelukast (SINGULAIR) 10 mg tablet       verapamil ER (VERELAN) 180 mg CR capsule        No current facility-administered medications on file prior to visit.          Allergy:  No Known Allergies    Review of Systems  General ROS: positive for  - fatigue but much improved  negative for - chills, fever, hot flashes, malaise, night sweats or sleep disturbance  ENT ROS: negative for - epistaxis, headaches, hearing change, nasal congestion, nasal discharge, nasal polyps, oral lesions, sinus pain, sneezing, sore throat, tinnitus, vertigo, visual changes or vocal changes  Hematological and Lymphatic ROS: negative for - bleeding problems, blood clots, bruising, jaundice, pallor or swollen lymph nodes  Endocrine ROS: negative for - polydipsia/polyuria, skin changes, temperature intolerance or unexpected weight changes  Respiratory ROS: negative for - cough and shortness of breath, hemoptysis, orthopnea, pleuritic pain, stridor, tachypnea or wheezing  Cardiovascular ROS: no chest pain or dyspnea on exertion  Gastrointestinal ROS: no abdominal pain, change in bowel habits, or black or bloody stools  Genito-Urinary ROS: no dysuria, trouble voiding, or hematuria  Musculoskeletal ROS: positive for - joint stiffness- stable  Neurological ROS: no TIA or stroke symptoms  Dermatological ROS: negative for - pruritus, rash or skin lesion changes   Psychological ROS: negative   Otherwise negative. Physical Exam:     Blood pressure 136/62, pulse 63, temperature 98.1 °F (36.7 °C), temperature source Oral, resp. rate 16, height 5' 4\" (1.626 m), weight 77.7 kg (171 lb 3.2 oz), SpO2 96 %. on RA, Body mass index is 29.39 kg/m².      General: No distress, appears stated age, pleasant and cooperative  HEENT: PERRL, EOMI, throat without erythema or exudate, Tongue- normal Uvula- midline,   Neck: Supple,  no abnormally enlarged lymph nodes, thyroid is not enlarged, non-tender, No JVD  Chest: normal- no gross abnormalities  Lungs: moderate air entry, clear to auscultation bilaterally, - NO wheezing, no crackles, no rhonchi  Heart:  Regular rate and rhythm, S1S2 present, without murmur  Abdomen: protuberant, bowel sounds normoactive, abdomen is soft without significant tenderness, or guarding  Extremity: negative for edema, cyanosis or clubbing  Skin: Skin color, texture, turgor normal. No rashes or lesions  Neuro: CN2-12 intact, normal muscle tone, alert and oriented    Data Reviewed:     CBC:   Lab Results   Component Value Date/Time    WBC 11.0 07/02/2019 02:45 AM    HGB 13.2 07/02/2019 02:45 AM    HCT 39.8 07/02/2019 02:45 AM    PLATELET 179 58/32/1492 02:45 AM    MCV 81.6 07/02/2019 02:45 AM       BMP:   Lab Results   Component Value Date/Time    Sodium 143 07/02/2019 02:45 AM    Potassium 3.5 07/02/2019 02:45 AM    Chloride 107 07/02/2019 02:45 AM    CO2 30 07/02/2019 02:45 AM    Anion gap 6 07/02/2019 02:45 AM    Glucose 106 (H) 07/02/2019 02:45 AM    BUN 20 (H) 07/02/2019 02:45 AM    Creatinine 1.03 07/02/2019 02:45 AM    BUN/Creatinine ratio 19 07/02/2019 02:45 AM    GFR est AA >60 07/02/2019 02:45 AM    GFR est non-AA 51 (L) 07/02/2019 02:45 AM    Calcium 9.3 07/02/2019 02:45 AM        Imaging:  [x]I have personally reviewed the patients radiographs section   Results from Hospital Encounter encounter on 08/03/19   XR CHEST PA LAT    Narrative CHEST TWO VIEWS    CPT CODE: 88535    INDICATION: Pneumonia follow-up. .    COMPARISON: Plain film 6/30/2019. FINDINGS:   Heart size normal. Aortic arch not enlarged. Atherosclerotic calcification in  the arch. No pneumothorax. No overt pulmonary edema. Previous identified right perihilar to middle lobe atelectasis/infiltrate has  resolved in the interim. No new infiltrate, consolidation or significant  effusion. Degenerative change involving multiple levels of the thoracic spine with similar  mild wedge compression deformity of lower thoracic vertebrae likely T11. Impression IMPRESSION:    1. Interval resolution of previous right middle lobe atelectasis. 2. No acute cardiopulmonary disease.         8/3/19                                                                                                   6/30/19      Results from Hospital Encounter encounter on 06/30/19   CT CHEST W CONT    Narrative EXAM: CT CHEST W CONT    CLINICAL HISTORY/INDICATIONS: dyspnea    TECHNIQUE:  Contiguous  5 mm axial images were obtained from thoracic inlet  through the adrenal glands after uneventful IV contrast administration. Contrast Used: 75 cc Isovue 300    CT scans at this facility are performed using dose optimization technique as  appropriate with performed exam, to include automated exposure control,  adjustment of mA and/or kV according to patient's size (including appropriate  matching for site-specific examinations), or use of iterative reconstruction  technique. COMPARISON: Chest x-ray 6/30/2019, CT scan chest report 10/24/2013    FINDINGS:  Mediastinum:  Abundant CAD noted. Thoracic aorta density ASCVD was otherwise  normal caliber without aneurysm or dissection. No obvious large pulmonary  embolus identified. No significant pleural effusion. Small 7 mm pretracheal  mediastinal node is noted. There are scattered calcified mediastinal and hilar  lymph nodes likely from old granulomatous disease. Lungs: There is atelectasis of the right middle lobe with collapse air  bronchograms and volume loss. There is partial filling defect identified in  right lower lobe bronchus at the branch point of the right middle lobe and right  lower lobe bronchus. Measures approximately 7 x 5 x 7 mm. This may be  contributing to the atelectasis of right middle lobe. Cannot confirm mass. Possibility of mucosal debris not excluded. Consider bronchoscopy to evaluate  this patient further. Left lung remains clear. Chest wall/ soft tissues:  Unremarkable    Upper abdomen: There is a nodule in the left adrenal gland measuring 2.1 x 1.4  cm. Etiology uncertain. Cannot confirm as an adenoma. Bones:  Degenerative change of the thoracic spine with old compression fracture  of the T11 vertebra again noted.           Impression IMPRESSION[de-identified]    1.  Consolidative atelectasis of right middle lobe with volume loss and  collapse. Air bronchograms are present. 2. Heterogeneous filling defect in the  right lower lobe bronchus at the  bifurcation of right middle lobe bronchus. Cannot confirm mass. Possible mucus  or debris. Consider bronchoscopy to evaluate this further. 3. ASCVD. CAD. 4.  2.1 cm left adrenal nodule of uncertain etiology.     Note: Report called to floor at 8:55 AM on 7/3/2019  Thank you for this referral.                 Bhaskar Munoz DO, FCCP  Pulmonary, Sleep and Critical Care Medicine

## 2019-08-26 NOTE — LETTER
8/26/19 Patient: [facility] YOB: 1934 Date of Visit: 8/26/2019 [address] One[address] Harrison MD 
[address] [place] [address] VIA Facsimile: [phone] Dear 241 Oneal Harrison MD, Thank you for referring Ms. Rose Marie Garrison to [address] [place] [address] for evaluation. My notes for this consultation are attached. If you have questions, please do not hesitate to call me. I look forward to following your patient along with you.  
 
 
Sincerely, 
 
Debbie Guillory, DO

## 2019-10-15 ENCOUNTER — HOSPITAL ENCOUNTER (OUTPATIENT)
Dept: MAMMOGRAPHY | Age: 84
Discharge: HOME | End: 2019-10-15
Attending: FAMILY MEDICINE
Payer: MEDICARE

## 2019-10-15 DIAGNOSIS — Z12.31 VISIT FOR SCREENING MAMMOGRAM: ICD-10-CM

## 2019-10-15 PROCEDURE — 77063 BREAST TOMOSYNTHESIS BI: CPT

## 2019-12-18 ENCOUNTER — HOSPITAL ENCOUNTER (EMERGENCY)
Age: 84
Discharge: HOME | End: 2019-12-18
Attending: EMERGENCY MEDICINE
Payer: MEDICARE

## 2019-12-18 ENCOUNTER — APPOINTMENT (OUTPATIENT)
Dept: GENERAL RADIOLOGY | Age: 84
End: 2019-12-18
Attending: NURSE PRACTITIONER
Payer: MEDICARE

## 2019-12-18 VITALS
RESPIRATION RATE: 18 BRPM | BODY MASS INDEX: 29.02 KG/M2 | TEMPERATURE: 98.7 F | HEART RATE: 80 BPM | SYSTOLIC BLOOD PRESSURE: 169 MMHG | HEIGHT: 64 IN | DIASTOLIC BLOOD PRESSURE: 102 MMHG | OXYGEN SATURATION: 93 % | WEIGHT: 170 LBS

## 2019-12-18 DIAGNOSIS — J20.9 ACUTE BRONCHITIS, UNSPECIFIED ORGANISM: ICD-10-CM

## 2019-12-18 DIAGNOSIS — J10.1 INFLUENZA A: Primary | ICD-10-CM

## 2019-12-18 LAB
ANION GAP SERPL CALC-SCNC: 5 MMOL/L (ref 3–18)
APPEARANCE UR: CLEAR
BACTERIA URNS QL MICRO: NEGATIVE /HPF
BASOPHILS # BLD: 0 K/UL (ref 0–0.1)
BASOPHILS NFR BLD: 0 % (ref 0–2)
BILIRUB UR QL: NEGATIVE
BUN SERPL-MCNC: 21 MG/DL (ref 7–18)
BUN/CREAT SERPL: 21 (ref 12–20)
CALCIUM SERPL-MCNC: 9.4 MG/DL (ref 8.5–10.1)
CHLORIDE SERPL-SCNC: 109 MMOL/L (ref 100–111)
CO2 SERPL-SCNC: 30 MMOL/L (ref 21–32)
COLOR UR: YELLOW
CREAT SERPL-MCNC: 1.01 MG/DL (ref 0.6–1.3)
DIFFERENTIAL METHOD BLD: ABNORMAL
EOSINOPHIL # BLD: 0.8 K/UL (ref 0–0.4)
EOSINOPHIL NFR BLD: 5 % (ref 0–5)
EPITH CASTS URNS QL MICRO: NORMAL /LPF (ref 0–5)
ERYTHROCYTE [DISTWIDTH] IN BLOOD BY AUTOMATED COUNT: 13.7 % (ref 11.6–14.5)
FLUAV AG NPH QL IA: POSITIVE
FLUBV AG NOSE QL IA: NEGATIVE
GLUCOSE SERPL-MCNC: 162 MG/DL (ref 74–99)
GLUCOSE UR STRIP.AUTO-MCNC: NEGATIVE MG/DL
HCT VFR BLD AUTO: 39.5 % (ref 35–45)
HGB BLD-MCNC: 12.7 G/DL (ref 12–16)
HGB UR QL STRIP: ABNORMAL
KETONES UR QL STRIP.AUTO: NEGATIVE MG/DL
LEUKOCYTE ESTERASE UR QL STRIP.AUTO: ABNORMAL
LYMPHOCYTES # BLD: 1.7 K/UL (ref 0.9–3.6)
LYMPHOCYTES NFR BLD: 10 % (ref 21–52)
MCH RBC QN AUTO: 27 PG (ref 24–34)
MCHC RBC AUTO-ENTMCNC: 32.2 G/DL (ref 31–37)
MCV RBC AUTO: 84 FL (ref 74–97)
MONOCYTES # BLD: 0.9 K/UL (ref 0.05–1.2)
MONOCYTES NFR BLD: 5 % (ref 3–10)
NEUTS SEG # BLD: 13.4 K/UL (ref 1.8–8)
NEUTS SEG NFR BLD: 80 % (ref 40–73)
NITRITE UR QL STRIP.AUTO: NEGATIVE
PH UR STRIP: 5 [PH] (ref 5–8)
PLATELET # BLD AUTO: 228 K/UL (ref 135–420)
PMV BLD AUTO: 10.6 FL (ref 9.2–11.8)
POTASSIUM SERPL-SCNC: 3.8 MMOL/L (ref 3.5–5.5)
PROT UR STRIP-MCNC: ABNORMAL MG/DL
RBC # BLD AUTO: 4.7 M/UL (ref 4.2–5.3)
RBC #/AREA URNS HPF: NORMAL /HPF (ref 0–5)
SODIUM SERPL-SCNC: 144 MMOL/L (ref 136–145)
SP GR UR REFRACTOMETRY: 1.01 (ref 1–1.03)
UROBILINOGEN UR QL STRIP.AUTO: 0.2 EU/DL (ref 0.2–1)
WBC # BLD AUTO: 16.9 K/UL (ref 4.6–13.2)
WBC URNS QL MICRO: NORMAL /HPF (ref 0–4)

## 2019-12-18 PROCEDURE — 71046 X-RAY EXAM CHEST 2 VIEWS: CPT

## 2019-12-18 PROCEDURE — 99285 EMERGENCY DEPT VISIT HI MDM: CPT

## 2019-12-18 PROCEDURE — 80048 BASIC METABOLIC PNL TOTAL CA: CPT

## 2019-12-18 PROCEDURE — 85025 COMPLETE CBC W/AUTO DIFF WBC: CPT

## 2019-12-18 PROCEDURE — 94640 AIRWAY INHALATION TREATMENT: CPT

## 2019-12-18 PROCEDURE — 87804 INFLUENZA ASSAY W/OPTIC: CPT

## 2019-12-18 PROCEDURE — 74011000250 HC RX REV CODE- 250: Performed by: NURSE PRACTITIONER

## 2019-12-18 PROCEDURE — 81001 URINALYSIS AUTO W/SCOPE: CPT

## 2019-12-18 PROCEDURE — 74011636637 HC RX REV CODE- 636/637: Performed by: NURSE PRACTITIONER

## 2019-12-18 RX ORDER — BENZONATATE 100 MG/1
100 CAPSULE ORAL
Qty: 20 CAP | Refills: 0 | Status: SHIPPED | OUTPATIENT
Start: 2019-12-18 | End: 2019-12-25

## 2019-12-18 RX ORDER — IPRATROPIUM BROMIDE AND ALBUTEROL SULFATE 2.5; .5 MG/3ML; MG/3ML
3 SOLUTION RESPIRATORY (INHALATION)
Status: COMPLETED | OUTPATIENT
Start: 2019-12-18 | End: 2019-12-18

## 2019-12-18 RX ORDER — PREDNISONE 20 MG/1
60 TABLET ORAL
Status: COMPLETED | OUTPATIENT
Start: 2019-12-18 | End: 2019-12-18

## 2019-12-18 RX ORDER — AZITHROMYCIN 500 MG/1
TABLET, FILM COATED ORAL
Qty: 3 TAB | Refills: 0 | Status: SHIPPED | OUTPATIENT
Start: 2019-12-18 | End: 2020-02-20

## 2019-12-18 RX ORDER — PREDNISONE 20 MG/1
20 TABLET ORAL DAILY
Qty: 3 TAB | Refills: 0 | Status: SHIPPED | OUTPATIENT
Start: 2019-12-19 | End: 2019-12-22

## 2019-12-18 RX ORDER — ALBUTEROL SULFATE 90 UG/1
2 AEROSOL, METERED RESPIRATORY (INHALATION)
Qty: 1 INHALER | Refills: 0 | Status: SHIPPED | OUTPATIENT
Start: 2019-12-18 | End: 2020-12-20

## 2019-12-18 RX ORDER — BENZONATATE 100 MG/1
100 CAPSULE ORAL
Qty: 20 CAP | Refills: 0 | Status: SHIPPED | OUTPATIENT
Start: 2019-12-18 | End: 2019-12-18

## 2019-12-18 RX ADMIN — PREDNISONE 60 MG: 20 TABLET ORAL at 10:41

## 2019-12-18 RX ADMIN — IPRATROPIUM BROMIDE AND ALBUTEROL SULFATE 3 ML: .5; 3 SOLUTION RESPIRATORY (INHALATION) at 12:05

## 2019-12-18 RX ADMIN — IPRATROPIUM BROMIDE AND ALBUTEROL SULFATE 3 ML: .5; 3 SOLUTION RESPIRATORY (INHALATION) at 10:41

## 2019-12-18 NOTE — DISCHARGE INSTRUCTIONS
Patient Education        Bronchitis: Care Instructions  Your Care Instructions  Bronchitis is inflammation of the bronchial tubes, which carry air to the lungs. The tubes swell and produce mucus, or phlegm. The mucus and inflamed bronchial tubes make you cough. You may have trouble breathing. Most cases of bronchitis are caused by viruses like those that cause colds. Antibiotics usually do not help and they may be harmful. Bronchitis usually develops rapidly and lasts about 2 to 3 weeks in otherwise healthy people. Follow-up care is a key part of your treatment and safety. Be sure to make and go to all appointments, and call your doctor if you are having problems. It's also a good idea to know your test results and keep a list of the medicines you take. How can you care for yourself at home? · Take all medicines exactly as prescribed. Call your doctor if you think you are having a problem with your medicine. · Get some extra rest.  · Take an over-the-counter pain medicine, such as acetaminophen (Tylenol), ibuprofen (Advil, Motrin), or naproxen (Aleve) to reduce fever and relieve body aches. Read and follow all instructions on the label. · Do not take two or more pain medicines at the same time unless the doctor told you to. Many pain medicines have acetaminophen, which is Tylenol. Too much acetaminophen (Tylenol) can be harmful. · Take an over-the-counter cough medicine that contains dextromethorphan to help quiet a dry, hacking cough so that you can sleep. Avoid cough medicines that have more than one active ingredient. Read and follow all instructions on the label. · Breathe moist air from a humidifier, hot shower, or sink filled with hot water. The heat and moisture will thin mucus so you can cough it out. · Do not smoke. Smoking can make bronchitis worse. If you need help quitting, talk to your doctor about stop-smoking programs and medicines. These can increase your chances of quitting for good.   When should you call for help? Call 911 anytime you think you may need emergency care. For example, call if:    · You have severe trouble breathing.    Call your doctor now or seek immediate medical care if:    · You have new or worse trouble breathing.     · You cough up dark brown or bloody mucus (sputum).     · You have a new or higher fever.     · You have a new rash.    Watch closely for changes in your health, and be sure to contact your doctor if:    · You cough more deeply or more often, especially if you notice more mucus or a change in the color of your mucus.     · You are not getting better as expected. Where can you learn more? Go to http://ranjith-pia.info/. Enter H333 in the search box to learn more about \"Bronchitis: Care Instructions. \"  Current as of: June 9, 2019  Content Version: 12.2  © 1969-3509 [employer]. Care instructions adapted under license by [employer] (which disclaims liability or warranty for this information). If you have questions about a medical condition or this instruction, always ask your healthcare professional. [place] [address] any warranty or liability for your use of this information. Patient Education        Influenza (Flu): Care Instructions  Your Care Instructions    Influenza (flu) is an infection in the lungs and breathing passages. It is caused by the influenza virus. There are different strains, or types, of the flu virus from year to year. Unlike the common cold, the flu comes on suddenly and the symptoms, such as a cough, congestion, fever, chills, fatigue, aches, and pains, are more severe. These symptoms may last up to 10 days. Although the flu can make you feel very sick, it usually doesn't cause serious health problems. Home treatment is usually all you need for flu symptoms.  But your doctor may prescribe antiviral medicine to prevent other health problems, such as pneumonia, from developing. Older people and those who have a long-term health condition, such as lung disease, are most at risk for having pneumonia or other health problems. Follow-up care is a key part of your treatment and safety. Be sure to make and go to all appointments, and call your doctor if you are having problems. It's also a good idea to know your test results and keep a list of the medicines you take. How can you care for yourself at home? · Get plenty of rest.  · Drink plenty of fluids, enough so that your urine is light yellow or clear like water. If you have kidney, heart, or liver disease and have to limit fluids, talk with your doctor before you increase the amount of fluids you drink. · Take an over-the-counter pain medicine if needed, such as acetaminophen (Tylenol), ibuprofen (Advil, Motrin), or naproxen (Aleve), to relieve fever, headache, and muscle aches. Read and follow all instructions on the label. No one younger than 20 should take aspirin. It has been linked to Reye syndrome, a serious illness. · Do not smoke. Smoking can make the flu worse. If you need help quitting, talk to your doctor about stop-smoking programs and medicines. These can increase your chances of quitting for good. · Breathe moist air from a hot shower or from a sink filled with hot water to help clear a stuffy nose. · Before you use cough and cold medicines, check the label. These medicines may not be safe for young children or for people with certain health problems. · If the skin around your nose and lips becomes sore, put some petroleum jelly on the area. · To ease coughing:  ? Drink fluids to soothe a scratchy throat. ? Suck on cough drops or plain hard candy. ? Take an over-the-counter cough medicine that contains dextromethorphan to help you get some sleep. Read and follow all instructions on the label. ? Raise your head at night with an extra pillow. This may help you rest if coughing keeps you awake.   · Take any prescribed medicine exactly as directed. Call your doctor if you think you are having a problem with your medicine. To avoid spreading the flu  · Wash your hands regularly, and keep your hands away from your face. · Stay home from school, work, and other public places until you are feeling better and your fever has been gone for at least 24 hours. The fever needs to have gone away on its own without the help of medicine. · Ask people living with you to talk to their doctors about preventing the flu. They may get antiviral medicine to keep from getting the flu from you. · To prevent the flu in the future, get a flu vaccine every fall. Encourage people living with you to get the vaccine. · Cover your mouth when you cough or sneeze. When should you call for help? Call 911 anytime you think you may need emergency care. For example, call if:    · You have severe trouble breathing.    Call your doctor now or seek immediate medical care if:    · You have new or worse trouble breathing.     · You seem to be getting much sicker.     · You feel very sleepy or confused.     · You have a new or higher fever.     · You get a new rash.    Watch closely for changes in your health, and be sure to contact your doctor if:    · You begin to get better and then get worse.     · You are not getting better after 1 week. Where can you learn more? Go to http://ranjith-pia.info/. Enter K936 in the search box to learn more about \"Influenza (Flu): Care Instructions. \"  Current as of: June 9, 2019  Content Version: 12.2  © 5225-7731 [employer]. Care instructions adapted under license by [employer] (which disclaims liability or warranty for this information). If you have questions about a medical condition or this instruction, always ask your healthcare professional. [place] [address] any warranty or liability for your use of this information.        [employer] Activation    Thank you for requesting access to sciencebite. Please follow the instructions below to securely access and download your online medical record. sciencebite allows you to send messages to your doctor, view your test results, renew your prescriptions, schedule appointments, and more. How Do I Sign Up? 1. In your internet browser, go to [email or web address]  2. Click on the First Time User? Click Here link in the Sign In box. You will be redirect to the New Member Sign Up page. 3. Enter your sciencebite Access Code exactly as it appears below. You will not need to use this code after youve completed the sign-up process. If you do not sign up before the expiration date, you must request a new code. sciencebite Access Code: [email or web address]  Expires: 1/15/2020 12:19 PM (This is the date your sciencebite access code will )    4. Enter the last four digits of your Social Security Number (xxxx) and Date of Birth (mm/dd/yyyy) as indicated and click Submit. You will be taken to the next sign-up page. 5. Create a sciencebite ID. This will be your sciencebite login ID and cannot be changed, so think of one that is secure and easy to remember. 6. Create a sciencebite password. You can change your password at any time. 7. Enter your Password Reset Question and Answer. This can be used at a later time if you forget your password. 8. Enter your e-mail address. You will receive e-mail notification when new information is available in [address]. 9. Click Sign Up. You can now view and download portions of your medical record. 10. Click the Download Summary menu link to download a portable copy of your medical information. Additional Information    If you have questions, please visit the Frequently Asked Questions section of the sciencebite website at [email or web address]. "[employer]/. Remember, sciencebite is NOT to be used for urgent needs. For medical emergencies, dial 911.

## 2019-12-18 NOTE — ED NOTES
Patient armband removed and shreddedI have reviewed discharge instructions with the patient. The patient verbalized understanding.  rx x 4 given;

## 2019-12-18 NOTE — ED PROVIDER NOTES
EMERGENCY DEPARTMENT HISTORY AND PHYSICAL EXAM    10:23 AM      Date: 12/18/2019  Patient Name: [facility]    History of Presenting Illness     Chief Complaint   Patient presents with    Flu Like Symptoms    Wheezing    Cough         History Provided By: Patient      Additional History (Context): [facility] is a 80 y.o. female with a PMHx hypertension, hyperlipidemia, diabetes, bronchitis, and pneumonia arrived to ER c/o runny nose, chills, fatigue, body ache, occasional hoarse voice, cough, congestion, and wheezing x5 days. She reports has been coughing up clear phlegm. Patient states she has been tolerating po fluids and solids well. Denies any recent travels or any other family members ill in the home. Denies fever, headache, neck pain/stiffness, visual disturbance, dizziness, ear pain, sore throat, difficulty swallowing, CP, SOB, at rest or on exertion, abdominal pain, N/V/D, flank pain, back pain, urinary sx's, or any other concerns. PCP: Bereket Del Toro MD    Chief Complaint: runny nose, chills, fatigue, body ache, occasional hoarse voice, cough, congestion, and wheezing  Duration:  Days  Timing:  Constant  Location: respiratory; ent  Quality: c/o bodyache  Severity: Moderate  Modifying Factors: none  Associated Symptoms: denies any other associated signs or symptoms    Current Outpatient Medications   Medication Sig Dispense Refill    albuterol (PROVENTIL HFA, VENTOLIN HFA, PROAIR HFA) 90 mcg/actuation inhaler Take 2 Puffs by inhalation every four (4) hours as needed for Wheezing. 1 Inhaler 0    azithromycin (ZITHROMAX TRI-JACLYN) 500 mg tab Take as directed until completion 3 Tab 0    [START ON 12/19/2019] predniSONE (DELTASONE) 20 mg tablet Take 20 mg by mouth daily for 3 days. With Breakfast 3 Tab 0    benzonatate (TESSALON PERLES) 100 mg capsule Take 1 Cap by mouth three (3) times daily as needed for Cough for up to 7 days.  20 Cap 0    aspirin 81 mg chewable tablet Take 81 mg by mouth daily.  CETIRIZINE HCL (CETIRIZINE PO) Take 10 mg by mouth nightly.  Cholecalciferol, Vitamin D3, (VITAMIN D3) 1,000 unit cap Take 1,000 Units by mouth daily.  cloNIDine (CATAPRESS) 0.2 mg tablet Take 0.2 mg by mouth two (2) times a day.  glimepiride (AMARYL) 4 mg tablet Take 2 mg by mouth daily.  lisinopril (PRINIVIL, ZESTRIL) 20 mg tablet Take 40 mg by mouth daily.  metoprolol (LOPRESSOR) 100 mg tablet Take 200 mg by mouth daily.  pravastatin (PRAVACHOL) 40 mg tablet Take 40 mg by mouth daily.  verapamil SR (CALAN-SR) 180 mg CR tablet Take 180 mg by mouth daily.  guaiFENesin (MUCUS RELIEF ER) 1,200 mg Ta12 ER tablet Take 1,200 mg by mouth two (2) times a day.  famotidine (PEPCID) 20 mg tablet Take 1 Tab by mouth daily. (Patient taking differently: Take 20 mg by mouth daily as needed.) 60 Tab 0    NEBULIZER by Does Not Apply route. Past History     Past Medical History:  Past Medical History:   Diagnosis Date    Bronchitis     Diabetes ([address]     Hyperlipemia     Hypertension     Pneumonia Jun-Jul 2019       Past Surgical History:  Past Surgical History:   Procedure Laterality Date    COLONOSCOPY N/A 6/7/2017    COLONOSCOPY / polypectomy performed by Juliana Pepper MD at [facility] ENDOSCOPY    HX CATARACT REMOVAL      HX COLONOSCOPY      2011       Family History:  Family History   Problem Relation Age of Onset    Heart Disease Mother     Diabetes Father     Breast Cancer Daughter        Social History:  Social History     Tobacco Use    Smoking status: Never Smoker    Smokeless tobacco: Never Used   Substance Use Topics    Alcohol use: No    Drug use: No       Allergies:  No Known Allergies      Review of Systems       Review of Systems   Constitutional: Positive for chills and fatigue. Negative for activity change, appetite change, diaphoresis and fever.    HENT: Positive for rhinorrhea and voice change (c/o occasional hoarse voice). Negative for ear pain, sinus pressure, sinus pain and trouble swallowing. Respiratory: Positive for cough and wheezing. Negative for choking, chest tightness, shortness of breath and stridor. Cardiovascular: Negative for chest pain, palpitations and leg swelling. Gastrointestinal: Negative for abdominal distention, abdominal pain, blood in stool, constipation, diarrhea, nausea and vomiting. Genitourinary: Negative for difficulty urinating, dysuria, flank pain, frequency and hematuria. Musculoskeletal: Negative for back pain, neck pain and neck stiffness. Generalize bodyache   Skin: Negative for rash. Neurological: Negative for dizziness, tremors, seizures, syncope, speech difficulty, light-headedness and headaches. Psychiatric/Behavioral: Negative for sleep disturbance and suicidal ideas. All other systems reviewed and are negative. Physical Exam     Visit Vitals  BP (!) 169/102 Comment: room air   Pulse 80   Temp 98.7 °F (37.1 °C)   Resp 18   Ht 5' 4\" (1.626 m)   Wt 77.1 kg (170 lb)   SpO2 93%   BMI 29.18 kg/m²         Physical Exam  Vitals signs and nursing note reviewed. Constitutional:       General: She is not in acute distress. Appearance: Normal appearance. She is normal weight. She is not ill-appearing, toxic-appearing or diaphoretic. HENT:      Right Ear: Hearing, tympanic membrane, ear canal and external ear normal.      Left Ear: Hearing, tympanic membrane, ear canal and external ear normal.      Nose: Congestion and rhinorrhea present. Mouth/Throat:      Mouth: Mucous membranes are moist.   Neck:      Musculoskeletal: Normal range of motion and neck supple. Cardiovascular:      Rate and Rhythm: Normal rate and regular rhythm. Pulses: Normal pulses. Heart sounds: Normal heart sounds. No murmur. No friction rub. No gallop. Pulmonary:      Effort: No accessory muscle usage or respiratory distress.       Breath sounds: Examination of the right-lower field reveals rhonchi. Examination of the left-lower field reveals rhonchi. Wheezing (scattered inspiratory and expiratory wheezes to all lobes) and rhonchi (very faint) present. Abdominal:      General: Abdomen is flat. There is no distension. Palpations: Abdomen is soft. There is no mass. Tenderness: There is no tenderness. There is no right CVA tenderness, left CVA tenderness, guarding or rebound. Hernia: No hernia is present. Musculoskeletal: Normal range of motion. Lymphadenopathy:      Cervical: No cervical adenopathy. Skin:     General: Skin is warm and dry. Neurological:      Mental Status: She is alert and oriented to person, place, and time. Sensory: Sensation is intact. Motor: Motor function is intact. Coordination: Coordination is intact. Coordination normal.      Gait: Gait normal.      Deep Tendon Reflexes: Reflexes normal.   Psychiatric:         Attention and Perception: Attention and perception normal.         Mood and Affect: Mood normal.         Speech: Speech normal.         Behavior: Behavior normal. Behavior is cooperative. Thought Content:  Thought content normal.         Cognition and Memory: Cognition and memory normal.         Judgment: Judgment normal.           Diagnostic Study Results     Labs -  Recent Results (from the past 12 hour(s))   INFLUENZA A & B AG (RAPID TEST)    Collection Time: 12/18/19 10:21 AM   Result Value Ref Range    Influenza A Antigen POSITIVE (A) NEG      Influenza B Antigen NEGATIVE  NEG     CBC WITH AUTOMATED DIFF    Collection Time: 12/18/19 10:49 AM   Result Value Ref Range    WBC 16.9 (H) 4.6 - 13.2 K/uL    RBC 4.70 4.20 - 5.30 M/uL    HGB 12.7 12.0 - 16.0 g/dL    HCT 39.5 35.0 - 45.0 %    MCV 84.0 74.0 - 97.0 FL    MCH 27.0 24.0 - 34.0 PG    MCHC 32.2 31.0 - 37.0 g/dL    RDW 13.7 11.6 - 14.5 %    PLATELET 250 427 - 296 K/uL    MPV 10.6 9.2 - 11.8 FL    NEUTROPHILS 80 (H) 40 - 73 %    LYMPHOCYTES 10 (L) 21 - 52 %    MONOCYTES 5 3 - 10 %    EOSINOPHILS 5 0 - 5 %    BASOPHILS 0 0 - 2 %    ABS. NEUTROPHILS 13.4 (H) 1.8 - 8.0 K/UL    ABS. LYMPHOCYTES 1.7 0.9 - 3.6 K/UL    ABS. MONOCYTES 0.9 0.05 - 1.2 K/UL    ABS. EOSINOPHILS 0.8 (H) 0.0 - 0.4 K/UL    ABS. BASOPHILS 0.0 0.0 - 0.1 K/UL    DF AUTOMATED     METABOLIC PANEL, BASIC    Collection Time: 12/18/19 10:49 AM   Result Value Ref Range    Sodium 144 136 - 145 mmol/L    Potassium 3.8 3.5 - 5.5 mmol/L    Chloride 109 100 - 111 mmol/L    CO2 30 21 - 32 mmol/L    Anion gap 5 3.0 - 18 mmol/L    Glucose 162 (H) 74 - 99 mg/dL    BUN 21 (H) 7.0 - 18 MG/DL    Creatinine 1.01 0.6 - 1.3 MG/DL    BUN/Creatinine ratio 21 (H) 12 - 20      GFR est AA >60 >60 ml/min/1.73m2    GFR est non-AA 52 (L) >60 ml/min/1.73m2    Calcium 9.4 8.5 - 10.1 MG/DL   URINALYSIS W/ RFLX MICROSCOPIC    Collection Time: 12/18/19 10:49 AM   Result Value Ref Range    Color YELLOW      Appearance CLEAR      Specific gravity 1.015 1.005 - 1.030      pH (UA) 5.0 5.0 - 8.0      Protein TRACE (A) NEG mg/dL    Glucose NEGATIVE  NEG mg/dL    Ketone NEGATIVE  NEG mg/dL    Bilirubin NEGATIVE  NEG      Blood TRACE (A) NEG      Urobilinogen 0.2 0.2 - 1.0 EU/dL    Nitrites NEGATIVE  NEG      Leukocyte Esterase MODERATE (A) NEG     URINE MICROSCOPIC ONLY    Collection Time: 12/18/19 10:49 AM   Result Value Ref Range    WBC 4 to 10 0 - 4 /hpf    RBC 0 to 3 0 - 5 /hpf    Epithelial cells FEW 0 - 5 /lpf    Bacteria NEGATIVE  NEG /hpf       Radiologic Studies -   XR CHEST PA LAT   Final Result   IMPRESSION:      Stable chest with no acute process. Medical Decision Making     It should be noted that JUAN C LOU NP will be the provider of record for this patient. I reviewed the vital signs, available nursing notes, past medical history, past surgical history, family history and social history. Vital Signs-Reviewed the patient's vital signs. Records Reviewed:  Old Medical Records (Time of Review: 10:23 AM)    ED Course: Progress Notes, Reevaluation, and Consults:    11:20 AM:  Patient stable condition. I have re-examined. Wheezing has decreased to all lobes. 12:40 PM:  Patient stable condition and she reports feeling better. I have re-examined. No further wheezing. Provider Notes (Medical Decision Making):   DDx  URI  Bronchitis  Pneumonia  COPD  Asthma Exacerbation  Influenza    Clinical Impression/Plan:  Patient afebrile, nontachycardic, and stable condition. Reviewed diagnostic results with patient and answered questions. Case also discussed with Dr. Mliss Felty including all lab results. Dr. Mliss Felty has re-evaluated patient and cleared patient to be d/c home. Patient is five days out from prescribing tamiflu. Encourage patient to increase fluid intake, rest, may take OTC tylenol as directed. Will prescribe Z-pack , albuterol inhaler, prednisone, and tessalon perles. Follow up with PCP in 2-4 days. Immediately return to ER for any worsening symptoms or concerns. Patient verbalizes d/c instructions. Diagnosis     Clinical Impression:   1. Influenza A    2. Acute bronchitis, unspecified organism        Disposition: Home    Follow-up Information     Follow up With Specialties Details Why Contact Info    Jackie Bojorquez MD General Practice Schedule an appointment as soon as possible for a visit in 2 days  [address]  [phone]      Return to ER immediately for any worsening symptoms or concerns        Antonia Teran DO Pulmonary Disease, Critical Care Medicine Schedule an appointment as soon as possible for a visit in 2 days  [address] [id number] [phone]             Patient's Medications   Start Taking    ALBUTEROL (PROVENTIL HFA, VENTOLIN HFA, PROAIR HFA) 90 MCG/ACTUATION INHALER    Take 2 Puffs by inhalation every four (4) hours as needed for Wheezing.     AZITHROMYCIN (ZITHROMAX TRI-JACLYN) 500 MG TAB    Take as directed until completion    BENZONATATE (TESSALON PERLES) 100 MG CAPSULE    Take 1 Cap by mouth three (3) times daily as needed for Cough for up to 7 days. PREDNISONE (DELTASONE) 20 MG TABLET    Take 20 mg by mouth daily for 3 days. With Breakfast   Continue Taking    ASPIRIN 81 MG CHEWABLE TABLET    Take 81 mg by mouth daily. CETIRIZINE HCL (CETIRIZINE PO)    Take 10 mg by mouth nightly. CHOLECALCIFEROL, VITAMIN D3, (VITAMIN D3) 1,000 UNIT CAP    Take 1,000 Units by mouth daily. CLONIDINE (CATAPRESS) 0.2 MG TABLET    Take 0.2 mg by mouth two (2) times a day. FAMOTIDINE (PEPCID) 20 MG TABLET    Take 1 Tab by mouth daily. GLIMEPIRIDE (AMARYL) 4 MG TABLET    Take 2 mg by mouth daily. GUAIFENESIN (MUCUS RELIEF ER) 1,200 MG TA12 ER TABLET    Take 1,200 mg by mouth two (2) times a day. LISINOPRIL (PRINIVIL, ZESTRIL) 20 MG TABLET    Take 40 mg by mouth daily. METOPROLOL (LOPRESSOR) 100 MG TABLET    Take 200 mg by mouth daily. NEBULIZER    by Does Not Apply route. PRAVASTATIN (PRAVACHOL) 40 MG TABLET    Take 40 mg by mouth daily. VERAPAMIL SR (CALAN-SR) 180 MG CR TABLET    Take 180 mg by mouth daily. These Medications have changed    No medications on file   Stop Taking    ALBUTEROL (PROVENTIL HFA, VENTOLIN HFA, PROAIR HFA) 90 MCG/ACTUATION INHALER    Take 2 Puffs by inhalation every six (6) hours as needed for Wheezing. MONTELUKAST SODIUM (SINGULAIR PO)    Take 10 mg by mouth daily.      _______________________________

## 2019-12-18 NOTE — ED NOTES
Dr. Deedra Bernheim and PA at bedside to speak with pt regarding discharge plan of care, rx etc  Pt looking in her purse for phone number to call for ride home

## 2019-12-18 NOTE — ED NOTES
Pt just made aware by provider that she is positive for the flu, new discharge papers being printed by provider

## 2019-12-18 NOTE — ED NOTES
Pt discharged to home with new paperwork adding flu diagnosis, all questions answered, pt is difficult to discharge, she is very talkative and asking a lot of questions about insurance and her doctor and [employer] etc.

## 2020-02-20 ENCOUNTER — HOSPITAL ENCOUNTER (EMERGENCY)
Age: 85
Discharge: HOME | End: 2020-02-20
Attending: EMERGENCY MEDICINE
Payer: MEDICARE

## 2020-02-20 ENCOUNTER — APPOINTMENT (OUTPATIENT)
Dept: GENERAL RADIOLOGY | Age: 85
End: 2020-02-20
Attending: EMERGENCY MEDICINE
Payer: MEDICARE

## 2020-02-20 VITALS
DIASTOLIC BLOOD PRESSURE: 84 MMHG | OXYGEN SATURATION: 96 % | HEIGHT: 64 IN | HEART RATE: 69 BPM | TEMPERATURE: 98.1 F | RESPIRATION RATE: 22 BRPM | WEIGHT: 160 LBS | SYSTOLIC BLOOD PRESSURE: 179 MMHG | BODY MASS INDEX: 27.31 KG/M2

## 2020-02-20 DIAGNOSIS — R09.81 NASAL CONGESTION: ICD-10-CM

## 2020-02-20 DIAGNOSIS — R06.2 WHEEZING: ICD-10-CM

## 2020-02-20 DIAGNOSIS — J20.9 ACUTE BRONCHITIS, UNSPECIFIED ORGANISM: Primary | ICD-10-CM

## 2020-02-20 LAB
ANION GAP SERPL CALC-SCNC: 5 MMOL/L (ref 3–18)
BASOPHILS # BLD: 0 K/UL (ref 0–0.06)
BASOPHILS NFR BLD: 0 % (ref 0–3)
BUN SERPL-MCNC: 22 MG/DL (ref 7–18)
BUN/CREAT SERPL: 23 (ref 12–20)
CALCIUM SERPL-MCNC: 9.8 MG/DL (ref 8.5–10.1)
CHLORIDE SERPL-SCNC: 109 MMOL/L (ref 100–111)
CK MB CFR SERPL CALC: 1.3 % (ref 0–4)
CK MB SERPL-MCNC: 1.3 NG/ML (ref 5–25)
CK SERPL-CCNC: 100 U/L (ref 26–192)
CO2 SERPL-SCNC: 28 MMOL/L (ref 21–32)
CREAT SERPL-MCNC: 0.95 MG/DL (ref 0.6–1.3)
DIFFERENTIAL METHOD BLD: ABNORMAL
EOSINOPHIL # BLD: 2 K/UL (ref 0–0.4)
EOSINOPHIL NFR BLD: 17 % (ref 0–5)
ERYTHROCYTE [DISTWIDTH] IN BLOOD BY AUTOMATED COUNT: 13.8 % (ref 11.6–14.5)
GLUCOSE SERPL-MCNC: 83 MG/DL (ref 74–99)
HCT VFR BLD AUTO: 41.7 % (ref 35–45)
HGB BLD-MCNC: 13.6 G/DL (ref 12–16)
LYMPHOCYTES # BLD: 3.9 K/UL (ref 0.8–3.5)
LYMPHOCYTES NFR BLD: 34 % (ref 20–51)
MCH RBC QN AUTO: 27.3 PG (ref 24–34)
MCHC RBC AUTO-ENTMCNC: 32.6 G/DL (ref 31–37)
MCV RBC AUTO: 83.6 FL (ref 74–97)
MONOCYTES # BLD: 0.5 K/UL (ref 0–1)
MONOCYTES NFR BLD: 4 % (ref 2–9)
NEUTS SEG # BLD: 5.2 K/UL (ref 1.8–8)
NEUTS SEG NFR BLD: 45 % (ref 42–75)
PLATELET # BLD AUTO: 280 K/UL (ref 135–420)
PLATELET COMMENTS,PCOM: ABNORMAL
PMV BLD AUTO: 10.7 FL (ref 9.2–11.8)
POTASSIUM SERPL-SCNC: 4.7 MMOL/L (ref 3.5–5.5)
RBC # BLD AUTO: 4.99 M/UL (ref 4.2–5.3)
RBC MORPH BLD: ABNORMAL
SODIUM SERPL-SCNC: 142 MMOL/L (ref 136–145)
TROPONIN I SERPL-MCNC: <0.02 NG/ML (ref 0–0.04)
WBC # BLD AUTO: 11.6 K/UL (ref 4.6–13.2)

## 2020-02-20 PROCEDURE — 94640 AIRWAY INHALATION TREATMENT: CPT

## 2020-02-20 PROCEDURE — 99282 EMERGENCY DEPT VISIT SF MDM: CPT

## 2020-02-20 PROCEDURE — 85025 COMPLETE CBC W/AUTO DIFF WBC: CPT

## 2020-02-20 PROCEDURE — 80048 BASIC METABOLIC PNL TOTAL CA: CPT

## 2020-02-20 PROCEDURE — 93005 ELECTROCARDIOGRAM TRACING: CPT

## 2020-02-20 PROCEDURE — 71046 X-RAY EXAM CHEST 2 VIEWS: CPT

## 2020-02-20 PROCEDURE — 82550 ASSAY OF CK (CPK): CPT

## 2020-02-20 PROCEDURE — 74011000250 HC RX REV CODE- 250: Performed by: EMERGENCY MEDICINE

## 2020-02-20 RX ORDER — PREDNISONE 20 MG/1
40 TABLET ORAL DAILY
Qty: 6 TAB | Refills: 0 | Status: SHIPPED | OUTPATIENT
Start: 2020-02-20 | End: 2020-02-23

## 2020-02-20 RX ORDER — IPRATROPIUM BROMIDE AND ALBUTEROL SULFATE 2.5; .5 MG/3ML; MG/3ML
3 SOLUTION RESPIRATORY (INHALATION)
Status: COMPLETED | OUTPATIENT
Start: 2020-02-20 | End: 2020-02-20

## 2020-02-20 RX ORDER — BENZONATATE 100 MG/1
200 CAPSULE ORAL
Qty: 30 CAP | Refills: 0 | Status: SHIPPED | OUTPATIENT
Start: 2020-02-20 | End: 2020-02-27

## 2020-02-20 RX ORDER — AZITHROMYCIN 250 MG/1
TABLET, FILM COATED ORAL
Qty: 6 TAB | Refills: 0 | Status: SHIPPED | OUTPATIENT
Start: 2020-02-20 | End: 2020-02-25

## 2020-02-20 RX ADMIN — IPRATROPIUM BROMIDE AND ALBUTEROL SULFATE 3 ML: .5; 3 SOLUTION RESPIRATORY (INHALATION) at 15:40

## 2020-02-20 NOTE — DISCHARGE INSTRUCTIONS
Patient Education        Bronchitis: Care Instructions  Your Care Instructions    Bronchitis is inflammation of the bronchial tubes, which carry air to the lungs. The tubes swell and produce mucus, or phlegm. The mucus and inflamed bronchial tubes make you cough. You may have trouble breathing. Most cases of bronchitis are caused by viruses like those that cause colds. Antibiotics usually do not help and they may be harmful. Bronchitis usually develops rapidly and lasts about 2 to 3 weeks in otherwise healthy people. Follow-up care is a key part of your treatment and safety. Be sure to make and go to all appointments, and call your doctor if you are having problems. It's also a good idea to know your test results and keep a list of the medicines you take. How can you care for yourself at home? · Take all medicines exactly as prescribed. Call your doctor if you think you are having a problem with your medicine. · Get some extra rest.  · Take an over-the-counter pain medicine, such as acetaminophen (Tylenol), ibuprofen (Advil, Motrin), or naproxen (Aleve) to reduce fever and relieve body aches. Read and follow all instructions on the label. · Do not take two or more pain medicines at the same time unless the doctor told you to. Many pain medicines have acetaminophen, which is Tylenol. Too much acetaminophen (Tylenol) can be harmful. · Take an over-the-counter cough medicine that contains dextromethorphan to help quiet a dry, hacking cough so that you can sleep. Avoid cough medicines that have more than one active ingredient. Read and follow all instructions on the label. · Breathe moist air from a humidifier, hot shower, or sink filled with hot water. The heat and moisture will thin mucus so you can cough it out. · Do not smoke. Smoking can make bronchitis worse. If you need help quitting, talk to your doctor about stop-smoking programs and medicines.  These can increase your chances of quitting for good.  When should you call for help? Call 911 anytime you think you may need emergency care. For example, call if:    · You have severe trouble breathing.    Call your doctor now or seek immediate medical care if:    · You have new or worse trouble breathing.     · You cough up dark brown or bloody mucus (sputum).     · You have a new or higher fever.     · You have a new rash.    Watch closely for changes in your health, and be sure to contact your doctor if:    · You cough more deeply or more often, especially if you notice more mucus or a change in the color of your mucus.     · You are not getting better as expected. Where can you learn more? Go to http://ranjith-pia.info/. Enter H333 in the search box to learn more about \"Bronchitis: Care Instructions. \"  Current as of: June 9, 2019  Patient Education        Wheezing or Bronchoconstriction: Care Instructions  Your Care Instructions  Wheezing is a whistling noise made during breathing. It occurs when the small airways, or bronchial tubes, that lead to your lungs swell or contract (spasm) and become narrow. This narrowing is called bronchoconstriction. When your airways constrict, it is hard for air to pass through and this makes it hard for you to breathe. Wheezing and bronchoconstriction can be caused by many problems, including:  · An infection such as the flu or a cold. · Allergies such as hay fever. · Diseases such as asthma or chronic obstructive pulmonary disease. · Smoking. Treatment for your wheezing depends on what is causing the problem. Your wheezing may get better without treatment. But you may need to pay attention to things that cause your wheezing and avoid them. Or you may need medicine to help treat the wheezing and to reduce the swelling or to relieve spasms in your lungs. Follow-up care is a key part of your treatment and safety.  Be sure to make and go to all appointments, and call your doctor if you are having problems. It is also a good idea to know your test results and keep a list of the medicines you take. How can you care for yourself at home? · Take your medicine exactly as prescribed. Call your doctor if you think you are having a problem with your medicine. You will get more details on the specific medicine your doctor prescribes. · If your doctor prescribed antibiotics, take them as directed. Do not stop taking them just because you feel better. You need to take the full course of antibiotics. · Breathe moist air from a humidifier, hot shower, or sink filled with hot water. This may help ease your symptoms and make it easier for you to breathe. · If you have congestion in your nose and throat, drinking plenty of fluids, especially hot fluids, may help relieve your symptoms. If you have kidney, heart, or liver disease and have to limit fluids, talk with your doctor before you increase the amount of fluids you drink. · If you have mucus in your airways, it may help to breathe deeply and cough. · Do not smoke or allow others to smoke around you. Smoking can make your wheezing worse. If you need help quitting, talk to your doctor about stop-smoking programs and medicines. These can increase your chances of quitting for good. · Avoid things that may cause your wheezing. These may include colds, smoke, air pollution, dust, pollen, pets, cockroaches, stress, and cold air. When should you call for help? Call 911 anytime you think you may need emergency care.  For example, call if:    · You have severe trouble breathing.     · You passed out (lost consciousness).    Call your doctor now or seek immediate medical care if:    · You cough up yellow, dark brown, or bloody mucus (sputum).     · You have new or worse shortness of breath.     · Your wheezing is not getting better or it gets worse after you start taking your medicine.    Watch closely for changes in your health, and be sure to contact your doctor if:    · You do not get better as expected. Where can you learn more? Go to http://ranjith-pia.info/. Enter 454 [id number] in the search box to learn more about \"Wheezing or Bronchoconstriction: Care Instructions. \"  Current as of: June 9, 2019  Content Version: 12.2  © 8883-6933 [employer]. Care instructions adapted under license by [employer] (which disclaims liability or warranty for this information). If you have questions about a medical condition or this instruction, always ask your healthcare professional. [address] any warranty or liability for your use of this information. Content Version: 12.2  © 8894-4415 [employer]. Care instructions adapted under license by [employer] (which disclaims liability or warranty for this information). If you have questions about a medical condition or this instruction, always ask your healthcare professional. [address] any warranty or liability for your use of this information.

## 2020-02-20 NOTE — ED PROVIDER NOTES
EMERGENCY DEPARTMENT HISTORY AND PHYSICAL EXAM    3:11 PM      Date: 2/20/2020  Patient Name: [facility]    History of Presenting Illness     Chief Complaint   Patient presents with    Cough    Shortness of Breath         History Provided By: Patient    Additional History (Context): [facility] is a 80 y.o. female with Past medical history of pneumonia, hypertension, diabetes, bronchitis, hyperlipidemia who presents with chief complaint of productive cough for the past week. Associated symptoms are runny nose, wheezing and shortness of breath. He does report that she saw her PCP and was started on some cough syrup recently she states that this medicine is gone and her symptoms have persisted. Denies chest pain, dizziness, vomiting, fever, body aches, sore throat, recent travel, sick contacts, leg swelling or leg pain, and no other complaint. PCP: Petra Hall MD        Past History     Past Medical History:  Past Medical History:   Diagnosis Date    Bronchitis     Diabetes ([address]     Hyperlipemia     Hypertension     Pneumonia Jun-Jul 2019       Past Surgical History:  Past Surgical History:   Procedure Laterality Date    COLONOSCOPY N/A 6/7/2017    COLONOSCOPY / polypectomy performed by Jacklin Halsted, MD at [facility] ENDOSCOPY    HX CATARACT REMOVAL      HX COLONOSCOPY      2011       Family History:  Family History   Problem Relation Age of Onset    Heart Disease Mother     Diabetes Father     Breast Cancer Daughter        Social History:  Social History     Tobacco Use    Smoking status: Never Smoker    Smokeless tobacco: Never Used   Substance Use Topics    Alcohol use: No    Drug use: No       Allergies:  No Known Allergies      Review of Systems       Review of Systems   Constitutional: Negative for chills and fever. HENT: Positive for congestion and rhinorrhea. Negative for sore throat and trouble swallowing.     Eyes: Negative for visual disturbance. Respiratory: Positive for cough, shortness of breath and wheezing. Cardiovascular: Negative for chest pain, palpitations and leg swelling. Gastrointestinal: Negative for abdominal pain, nausea and vomiting. Endocrine: Negative for polyuria. Genitourinary: Negative for difficulty urinating and dysuria. Musculoskeletal: Negative for arthralgias and neck stiffness. Skin: Negative for pallor and rash. Neurological: Negative for dizziness, weakness, numbness and headaches. Hematological: Does not bruise/bleed easily. Psychiatric/Behavioral: Negative for confusion and dysphoric mood. All other systems reviewed and are negative. Physical Exam     Visit Vitals  /84 (BP 1 Location: Left arm, BP Patient Position: At rest)   Pulse 69   Temp 98.1 °F (36.7 °C)   Resp 22   Ht 5' 4\" (1.626 m)   Wt 72.6 kg (160 lb)   SpO2 96%   BMI 27.46 kg/m²         Physical Exam  Vitals signs and nursing note reviewed. Constitutional:       General: She is not in acute distress. Appearance: She is well-developed. She is not toxic-appearing or diaphoretic. HENT:      Head: Normocephalic and atraumatic. Eyes:      General: No scleral icterus. Conjunctiva/sclera: Conjunctivae normal.      Pupils: Pupils are equal, round, and reactive to light. Neck:      Musculoskeletal: Normal range of motion and neck supple. Cardiovascular:      Rate and Rhythm: Normal rate. Heart sounds: Normal heart sounds. Comments: Capillary refill < 3 seconds  Pulmonary:      Effort: Pulmonary effort is normal. No respiratory distress. Breath sounds: Wheezing present. No decreased breath sounds, rhonchi or rales. Comments: Some scattered wheezes in both lungs  Active cough at the bedside  Abdominal:      General: Bowel sounds are normal. There is no distension. Palpations: Abdomen is soft. Tenderness: There is no abdominal tenderness. Musculoskeletal: Normal range of motion. Lymphadenopathy:      Cervical: No cervical adenopathy. Skin:     General: Skin is warm and dry. Coloration: Skin is not pale. Neurological:      General: No focal deficit present. Mental Status: She is alert and oriented to person, place, and time. Cranial Nerves: No cranial nerve deficit. Motor: No weakness. Psychiatric:         Mood and Affect: Mood normal.           Diagnostic Study Results     Labs -  Recent Results (from the past 12 hour(s))   EKG, 12 LEAD, INITIAL    Collection Time: 02/20/20  3:25 PM   Result Value Ref Range    Ventricular Rate 59 BPM    Atrial Rate 59 BPM    P-R Interval 196 ms    QRS Duration 72 ms    Q-T Interval 418 ms    QTC Calculation (Bezet) 413 ms    Calculated P Axis 72 degrees    Calculated R Axis 67 degrees    Calculated T Axis 70 degrees    Diagnosis       Sinus bradycardia  Otherwise normal ECG  When compared with ECG of 30-JUN-2019 11:26,  No significant change was found     CBC WITH AUTOMATED DIFF    Collection Time: 02/20/20  3:39 PM   Result Value Ref Range    WBC 11.6 4.6 - 13.2 K/uL    RBC 4.99 4.20 - 5.30 M/uL    HGB 13.6 12.0 - 16.0 g/dL    HCT 41.7 35.0 - 45.0 %    MCV 83.6 74.0 - 97.0 FL    MCH 27.3 24.0 - 34.0 PG    MCHC 32.6 31.0 - 37.0 g/dL    RDW 13.8 11.6 - 14.5 %    PLATELET 497 589 - 981 K/uL    MPV 10.7 9.2 - 11.8 FL    NEUTROPHILS 45 42 - 75 %    LYMPHOCYTES 34 20 - 51 %    MONOCYTES 4 2 - 9 %    EOSINOPHILS 17 (H) 0 - 5 %    BASOPHILS 0 0 - 3 %    ABS. NEUTROPHILS 5.2 1.8 - 8.0 K/UL    ABS. LYMPHOCYTES 3.9 (H) 0.8 - 3.5 K/UL    ABS. MONOCYTES 0.5 0 - 1.0 K/UL    ABS. EOSINOPHILS 2.0 (H) 0.0 - 0.4 K/UL    ABS.  BASOPHILS 0.0 0.0 - 0.06 K/UL    DF MANUAL      PLATELET COMMENTS ADEQUATE PLATELETS      RBC COMMENTS NORMOCYTIC, NORMOCHROMIC     METABOLIC PANEL, BASIC    Collection Time: 02/20/20  3:39 PM   Result Value Ref Range    Sodium 142 136 - 145 mmol/L    Potassium 4.7 3.5 - 5.5 mmol/L    Chloride 109 100 - 111 mmol/L    CO2 28 21 - 32 mmol/L    Anion gap 5 3.0 - 18 mmol/L    Glucose 83 74 - 99 mg/dL    BUN 22 (H) 7.0 - 18 MG/DL    Creatinine 0.95 0.6 - 1.3 MG/DL    BUN/Creatinine ratio 23 (H) 12 - 20      GFR est AA >60 >60 ml/min/1.73m2    GFR est non-AA 56 (L) >60 ml/min/1.73m2    Calcium 9.8 8.5 - 10.1 MG/DL   CARDIAC PANEL,(CK, CKMB & TROPONIN)    Collection Time: 02/20/20  3:39 PM   Result Value Ref Range     26 - 192 U/L    CK - MB 1.3 <3.6 ng/ml    CK-MB Index 1.3 0.0 - 4.0 %    Troponin-I, QT <0.02 0.0 - 0.045 NG/ML       Radiologic Studies -   XR CHEST PA LAT   Final Result   IMPRESSION:   1. No acute infiltrate or effusion. 2.  Osteopenia and old thoracic compression deformity. Medical Decision Making   I am the first provider for this patient. I reviewed the vital signs, available nursing notes, past medical history, past surgical history, family history and social history. Vital Signs-Reviewed the patient's vital signs. EKG: Interpreted by the EP Dr. Percival Brittle. Time Interpreted: 1525   Rate: 59   Rhythm: Sinus bradycardia   Interpretation: Normal QRS duration, normal QTC, normal axis, no ST elevation, no ST depressions     Records Reviewed: Nursing Notes and Old Medical Records (Time of Review: 3:11 PM)    Provider Notes (Medical Decision Making): DDX: Acute bronchitis, pneumonia, CHF, other cardiac, allergic rhinitis, viral URI, COPD    Labs, chest x-ray, DuoNeb, EKG      MDM    Medications   albuterol-ipratropium (DUO-NEB) 2.5 MG-0.5 MG/3 ML (3 mL Nebulization Given 2/20/20 1540)           ED Course: Progress Notes, Reevaluation, and Consults:  WBC within normal limits  Creatinine within normal limits  Troponin negative  Labs reassuring    I have reassessed the patient. I have discussed the workup, results and plan with the patient and patient is in agreement. Patient is feeling better. Patient will be prescribed saline nasal spray, Z-Mateusz, Tessalon Perles, prednisone.   Patient was discharge in stable condition. Patient was given outpatient follow up. Patient is to return to emergency department if any new or worsening condition. Diagnosis     Clinical Impression:   1. Acute bronchitis, unspecified organism    2. Wheezing    3. Nasal congestion        Disposition: Discharged    Follow-up Information     Follow up With Specialties Details Why Contact Info    Ricardo Bloch, MD General Practice Schedule an appointment as soon as possible for a visit in 2 days  [address]      [id number] [facility] EMERGENCY DEPT Emergency Medicine  As needed, If symptoms worsen [address] [facility]  [phone]           Patient's Medications   Start Taking    AZITHROMYCIN (ZITHROMAX Z-JACLYN) 250 MG TABLET    Take as written    BENZONATATE (TESSALON PERLES) 100 MG CAPSULE    Take 2 Caps by mouth three (3) times daily as needed for Cough for up to 7 days. Indications: cough    PREDNISONE (DELTASONE) 20 MG TABLET    Take 40 mg by mouth daily for 3 days. With Breakfast    SODIUM CHLORIDE (SALINE MIST) 0.65 % NASAL SQUEEZE BOTTLE    0.1 mL by Both Nostrils route as needed for Congestion. Indications: stuffy nose   Continue Taking    ALBUTEROL (PROVENTIL HFA, VENTOLIN HFA, PROAIR HFA) 90 MCG/ACTUATION INHALER    Take 2 Puffs by inhalation every four (4) hours as needed for Wheezing. ASPIRIN 81 MG CHEWABLE TABLET    Take 81 mg by mouth daily. CETIRIZINE HCL (CETIRIZINE PO)    Take 10 mg by mouth nightly. CHOLECALCIFEROL, VITAMIN D3, (VITAMIN D3) 1,000 UNIT CAP    Take 1,000 Units by mouth daily. CLONIDINE (CATAPRESS) 0.2 MG TABLET    Take 0.2 mg by mouth two (2) times a day. FAMOTIDINE (PEPCID) 20 MG TABLET    Take 1 Tab by mouth daily. GLIMEPIRIDE (AMARYL) 4 MG TABLET    Take 2 mg by mouth daily. GUAIFENESIN (MUCUS RELIEF ER) 1,200 MG TA12 ER TABLET    Take 1,200 mg by mouth two (2) times a day.     LISINOPRIL (PRINIVIL, ZESTRIL) 20 MG TABLET    Take 40 mg by mouth daily. METOPROLOL (LOPRESSOR) 100 MG TABLET    Take 200 mg by mouth daily. NEBULIZER    by Does Not Apply route. PRAVASTATIN (PRAVACHOL) 40 MG TABLET    Take 40 mg by mouth daily. VERAPAMIL SR (CALAN-SR) 180 MG CR TABLET    Take 180 mg by mouth daily. These Medications have changed    No medications on file   Stop Taking    AZITHROMYCIN (ZITHROMAX TRI-JACLYN) 500 MG TAB    Take as directed until completion         DO Wil Silveiraon medical dictation software was used for portions of this report. Unintended transcription errors may occur. My signature above authenticates this document and my orders, the final    diagnosis (es), discharge prescription (s), and instructions in the Epic    record.

## 2020-02-20 NOTE — ED TRIAGE NOTES
Pt reports cough productive of white sputum and exertional dyspnea that began last week.   H/o bronchitis & pneumonia

## 2020-02-21 LAB
ATRIAL RATE: 59 BPM
CALCULATED P AXIS, ECG09: 72 DEGREES
CALCULATED R AXIS, ECG10: 67 DEGREES
CALCULATED T AXIS, ECG11: 70 DEGREES
DIAGNOSIS, 93000: NORMAL
P-R INTERVAL, ECG05: 196 MS
Q-T INTERVAL, ECG07: 418 MS
QRS DURATION, ECG06: 72 MS
QTC CALCULATION (BEZET), ECG08: 413 MS
VENTRICULAR RATE, ECG03: 59 BPM

## 2020-04-12 ENCOUNTER — APPOINTMENT (OUTPATIENT)
Dept: CT IMAGING | Age: 85
DRG: 192 | End: 2020-04-12
Attending: EMERGENCY MEDICINE
Payer: MEDICARE

## 2020-04-12 ENCOUNTER — HOSPITAL ENCOUNTER (INPATIENT)
Age: 85
LOS: 2 days | Discharge: HOME HEALTH | DRG: 192 | End: 2020-04-18
Attending: EMERGENCY MEDICINE | Admitting: INTERNAL MEDICINE
Payer: MEDICARE

## 2020-04-12 ENCOUNTER — APPOINTMENT (OUTPATIENT)
Dept: GENERAL RADIOLOGY | Age: 85
DRG: 192 | End: 2020-04-12
Attending: EMERGENCY MEDICINE
Payer: MEDICARE

## 2020-04-12 DIAGNOSIS — J44.1 COPD EXACERBATION (HCC): Primary | ICD-10-CM

## 2020-04-12 DIAGNOSIS — R09.02 HYPOXEMIA: ICD-10-CM

## 2020-04-12 DIAGNOSIS — J20.9 ACUTE BRONCHITIS, UNSPECIFIED ORGANISM: ICD-10-CM

## 2020-04-12 PROBLEM — J18.9 CAP (COMMUNITY ACQUIRED PNEUMONIA): Status: RESOLVED | Noted: 2019-06-30 | Resolved: 2020-04-12

## 2020-04-12 LAB
ANION GAP SERPL CALC-SCNC: 7 MMOL/L (ref 3–18)
ARTERIAL PATENCY WRIST A: YES
BASE EXCESS BLD CALC-SCNC: 3 MMOL/L
BASOPHILS # BLD: 0.1 K/UL (ref 0–0.1)
BASOPHILS NFR BLD: 1 % (ref 0–2)
BDY SITE: ABNORMAL
BODY TEMPERATURE: 99.1
BUN SERPL-MCNC: 15 MG/DL (ref 7–18)
BUN/CREAT SERPL: 15 (ref 12–20)
CALCIUM SERPL-MCNC: 10.2 MG/DL (ref 8.5–10.1)
CHLORIDE SERPL-SCNC: 105 MMOL/L (ref 100–111)
CK MB CFR SERPL CALC: 3 % (ref 0–4)
CK MB SERPL-MCNC: 5 NG/ML (ref 5–25)
CK SERPL-CCNC: 165 U/L (ref 26–192)
CO2 SERPL-SCNC: 30 MMOL/L (ref 21–32)
CREAT SERPL-MCNC: 1.01 MG/DL (ref 0.6–1.3)
CRP SERPL-MCNC: 0.6 MG/DL (ref 0–0.3)
D DIMER PPP FEU-MCNC: 1.02 UG/ML(FEU)
DIFFERENTIAL METHOD BLD: NORMAL
EOSINOPHIL # BLD: 0.4 K/UL (ref 0–0.4)
EOSINOPHIL NFR BLD: 5 % (ref 0–5)
ERYTHROCYTE [DISTWIDTH] IN BLOOD BY AUTOMATED COUNT: 13.5 % (ref 11.6–14.5)
FERRITIN SERPL-MCNC: 194 NG/ML (ref 8–388)
FLUAV AG NPH QL IA: NEGATIVE
FLUBV AG NOSE QL IA: NEGATIVE
GAS FLOW.O2 O2 DELIVERY SYS: ABNORMAL L/MIN
GLUCOSE SERPL-MCNC: 109 MG/DL (ref 74–99)
HCO3 BLD-SCNC: 28.5 MMOL/L (ref 22–26)
HCT VFR BLD AUTO: 42.4 % (ref 35–45)
HGB BLD-MCNC: 13.8 G/DL (ref 12–16)
LACTATE BLD-SCNC: 0.96 MMOL/L (ref 0.4–2)
LACTATE SERPL-SCNC: 1.8 MMOL/L (ref 0.4–2)
LDH SERPL L TO P-CCNC: 260 U/L (ref 81–234)
LYMPHOCYTES # BLD: 2.3 K/UL (ref 0.9–3.6)
LYMPHOCYTES NFR BLD: 25 % (ref 21–52)
MCH RBC QN AUTO: 27.4 PG (ref 24–34)
MCHC RBC AUTO-ENTMCNC: 32.5 G/DL (ref 31–37)
MCV RBC AUTO: 84.3 FL (ref 74–97)
MONOCYTES # BLD: 0.9 K/UL (ref 0.05–1.2)
MONOCYTES NFR BLD: 9 % (ref 3–10)
NEUTS SEG # BLD: 5.7 K/UL (ref 1.8–8)
NEUTS SEG NFR BLD: 60 % (ref 40–73)
PCO2 BLD: 46.2 MMHG (ref 35–45)
PH BLD: 7.4 [PH] (ref 7.35–7.45)
PLATELET # BLD AUTO: 231 K/UL (ref 135–420)
PMV BLD AUTO: 11.2 FL (ref 9.2–11.8)
PO2 BLD: 80 MMHG (ref 80–100)
POTASSIUM SERPL-SCNC: 3.8 MMOL/L (ref 3.5–5.5)
RBC # BLD AUTO: 5.03 M/UL (ref 4.2–5.3)
SAO2 % BLD: 95 % (ref 92–97)
SERVICE CMNT-IMP: ABNORMAL
SODIUM SERPL-SCNC: 142 MMOL/L (ref 136–145)
SPECIMEN TYPE: ABNORMAL
TOTAL RESP. RATE, ITRR: 20
TROPONIN I SERPL-MCNC: <0.02 NG/ML (ref 0–0.04)
WBC # BLD AUTO: 9.4 K/UL (ref 4.6–13.2)

## 2020-04-12 PROCEDURE — 80048 BASIC METABOLIC PNL TOTAL CA: CPT

## 2020-04-12 PROCEDURE — 65390000012 HC CONDITION CODE 44 OBSERVATION

## 2020-04-12 PROCEDURE — 83605 ASSAY OF LACTIC ACID: CPT

## 2020-04-12 PROCEDURE — 87804 INFLUENZA ASSAY W/OPTIC: CPT

## 2020-04-12 PROCEDURE — 74011000250 HC RX REV CODE- 250: Performed by: EMERGENCY MEDICINE

## 2020-04-12 PROCEDURE — 36600 WITHDRAWAL OF ARTERIAL BLOOD: CPT

## 2020-04-12 PROCEDURE — 87040 BLOOD CULTURE FOR BACTERIA: CPT

## 2020-04-12 PROCEDURE — 83615 LACTATE (LD) (LDH) ENZYME: CPT

## 2020-04-12 PROCEDURE — 82803 BLOOD GASES ANY COMBINATION: CPT

## 2020-04-12 PROCEDURE — 93005 ELECTROCARDIOGRAM TRACING: CPT

## 2020-04-12 PROCEDURE — 71045 X-RAY EXAM CHEST 1 VIEW: CPT

## 2020-04-12 PROCEDURE — 86140 C-REACTIVE PROTEIN: CPT

## 2020-04-12 PROCEDURE — 85025 COMPLETE CBC W/AUTO DIFF WBC: CPT

## 2020-04-12 PROCEDURE — 84484 ASSAY OF TROPONIN QUANT: CPT

## 2020-04-12 PROCEDURE — 82728 ASSAY OF FERRITIN: CPT

## 2020-04-12 PROCEDURE — 74011636320 HC RX REV CODE- 636/320: Performed by: EMERGENCY MEDICINE

## 2020-04-12 PROCEDURE — 85379 FIBRIN DEGRADATION QUANT: CPT

## 2020-04-12 PROCEDURE — 99218 HC RM OBSERVATION: CPT

## 2020-04-12 PROCEDURE — 74011636637 HC RX REV CODE- 636/637: Performed by: EMERGENCY MEDICINE

## 2020-04-12 PROCEDURE — 94640 AIRWAY INHALATION TREATMENT: CPT

## 2020-04-12 PROCEDURE — 71275 CT ANGIOGRAPHY CHEST: CPT

## 2020-04-12 PROCEDURE — 99285 EMERGENCY DEPT VISIT HI MDM: CPT

## 2020-04-12 PROCEDURE — 0100U RESPIRATORY PANEL,PCR,NASOPHARYNGEAL: CPT

## 2020-04-12 RX ORDER — IPRATROPIUM BROMIDE AND ALBUTEROL SULFATE 2.5; .5 MG/3ML; MG/3ML
3 SOLUTION RESPIRATORY (INHALATION)
Status: COMPLETED | OUTPATIENT
Start: 2020-04-12 | End: 2020-04-12

## 2020-04-12 RX ORDER — ALBUTEROL SULFATE 0.83 MG/ML
2.5 SOLUTION RESPIRATORY (INHALATION) ONCE
Status: COMPLETED | OUTPATIENT
Start: 2020-04-12 | End: 2020-04-12

## 2020-04-12 RX ORDER — PREDNISONE 20 MG/1
40 TABLET ORAL
Status: COMPLETED | OUTPATIENT
Start: 2020-04-12 | End: 2020-04-12

## 2020-04-12 RX ADMIN — ALBUTEROL SULFATE 2.5 MG: 2.5 SOLUTION RESPIRATORY (INHALATION) at 17:26

## 2020-04-12 RX ADMIN — IOPAMIDOL 100 ML: 755 INJECTION, SOLUTION INTRAVENOUS at 20:04

## 2020-04-12 RX ADMIN — IPRATROPIUM BROMIDE AND ALBUTEROL SULFATE 3 ML: .5; 3 SOLUTION RESPIRATORY (INHALATION) at 15:50

## 2020-04-12 RX ADMIN — PREDNISONE 40 MG: 20 TABLET ORAL at 15:50

## 2020-04-12 NOTE — ROUTINE PROCESS
TRANSFER - IN REPORT: 
 
Verbal report received from Goldy Lawrence RN(name) on [place]  being received from [facility] (unit) for routine progression of care Report consisted of patients Situation, Background, Assessment and  
Recommendations(SBAR). Information from the following report(s) SBAR, Kardex, Intake/Output, MAR, Recent Results and Med Rec Status was reviewed with the receiving nurse. Opportunity for questions and clarification will be provided. Assessment will be completed upon patients arrival to unit and care assumed. [id number]  Dr Orquidea Stahl at bedside. Patient coughing a lot. Orders given to give Codeine Robitussin to patient.

## 2020-04-12 NOTE — ED NOTES
Dr. Ty Stone responded and discussed with him about having the patient get a CTA prior to transporting the patient to [place], Report was called to [facility] and they are confortable with the patient being transferred and performing the CTA there, however, they advised it may not be able to be performed tonight. CN notified also, Dr. Ty Stone stated he would like the CTA to be performed prior to transport to [facility], he was advised that per our radiology department, that he needs to call [facility] and discuss the CTA with Dr. Belle Zeng Radiologist prior to performing the scan here. Dr. Yonny Webber stated that was fine and he would call them and have Dr. Belle Zeng call [facility] radiology department. ALEX CARDENAS notified and aware.

## 2020-04-12 NOTE — ED PROVIDER NOTES
79-year-old female history of diabetes, hypertension, hyperlipidemia, COPD presents for evaluation of shortness of breath and cough. Patient has been having intermittent cough and congestion since December when she was diagnosed with influenza. Worsening symptoms over the last 48 hours with increasing dyspnea. Cough produces small amounts of white sputum. No history of fever. No chest pain.              Past Medical History:   Diagnosis Date    Bronchitis     Diabetes ([address]     Hyperlipemia     Hypertension     Pneumonia Jun-Jul 2019       Past Surgical History:   Procedure Laterality Date    COLONOSCOPY N/A 6/7/2017    COLONOSCOPY / polypectomy performed by Maggi Robbins MD at [facility] ENDOSCOPY    HX CATARACT REMOVAL      HX COLONOSCOPY      2011         Family History:   Problem Relation Age of Onset    Heart Disease Mother     Diabetes Father     Breast Cancer Daughter        Social History     Socioeconomic History    Marital status:      Spouse name: Not on file    Number of children: Not on file    Years of education: Not on file    Highest education level: Not on file   Occupational History    Not on file   Social Needs    Financial resource strain: Not on file    Food insecurity     Worry: Not on file     Inability: Not on file    Transportation needs     Medical: Not on file     Non-medical: Not on file   Tobacco Use    Smoking status: Never Smoker    Smokeless tobacco: Never Used   Substance and Sexual Activity    Alcohol use: No    Drug use: No    Sexual activity: Not on file   Lifestyle    Physical activity     Days per week: Not on file     Minutes per session: Not on file    Stress: Not on file   Relationships    Social connections     Talks on phone: Not on file     Gets together: Not on file     Attends Jainism service: Not on file     Active member of club or organization: Not on file     Attends meetings of clubs or organizations: Not on file     Relationship status: Not on file    Intimate partner violence     Fear of current or ex partner: Not on file     Emotionally abused: Not on file     Physically abused: Not on file     Forced sexual activity: Not on file   Other Topics Concern    Not on file   Social History Narrative    Not on file         ALLERGIES: Patient has no known allergies. Review of Systems   Constitutional: Negative for fever. Respiratory: Positive for cough, shortness of breath and wheezing. Cardiovascular: Negative for chest pain. Gastrointestinal: Negative for abdominal pain. All other systems reviewed and are negative. Vitals:    04/12/20 1425 04/12/20 1454 04/12/20 1500   BP: 154/90 149/79 162/81   Pulse: (!) 116 (!) 108 (!) 110   Resp: 30 24 18   Temp: 98.8 °F (37.1 °C)     SpO2: 95% 100% 100%   Weight: 72.6 kg (160 lb)     Height: 5' 4\" (1.626 m)              Physical Exam  Vitals signs and nursing note reviewed. Constitutional:       General: She is not in acute distress. Appearance: She is well-developed. HENT:      Head: Normocephalic and atraumatic. Eyes:      General: No scleral icterus. Cardiovascular:      Rate and Rhythm: Normal rate and regular rhythm. Pulmonary:      Comments: Mild to moderate tachypnea. Scattered expiratory wheezes bilaterally. No rales. Abdominal:      Tenderness: There is no abdominal tenderness. Musculoskeletal:      Right lower leg: No edema. Left lower leg: No edema. Skin:     General: Skin is warm and dry. Neurological:      Mental Status: She is alert and oriented to person, place, and time. Chest x-ray interpreted per myself as demonstrating mild interstitial changes with basilar scarring versus atelectasis. This is compared to prior chest x-ray dated 2/20/2020 and appears essentially unchanged. No focal infiltrate identified. EKG interpreted per myself at 1440 hrs. reveals a sinus tachycardia, rate 117. No ST segment elevations. Delayed transition. Recent Results (from the past 12 hour(s))   EKG, 12 LEAD, INITIAL    Collection Time: 04/12/20  2:38 PM   Result Value Ref Range    Ventricular Rate 117 BPM    Atrial Rate 117 BPM    P-R Interval 184 ms    QRS Duration 66 ms    Q-T Interval 310 ms    QTC Calculation (Bezet) 432 ms    Calculated P Axis 78 degrees    Calculated R Axis 74 degrees    Calculated T Axis 65 degrees    Diagnosis       Sinus tachycardia  Cannot rule out Anterior infarct , age undetermined  Abnormal ECG  When compared with ECG of 20-FEB-2020 15:25,  Vent. rate has increased BY  58 BPM  T wave amplitude has decreased in Lateral leads     CBC WITH AUTOMATED DIFF    Collection Time: 04/12/20  2:49 PM   Result Value Ref Range    WBC 9.4 4.6 - 13.2 K/uL    RBC 5.03 4.20 - 5.30 M/uL    HGB 13.8 12.0 - 16.0 g/dL    HCT 42.4 35.0 - 45.0 %    MCV 84.3 74.0 - 97.0 FL    MCH 27.4 24.0 - 34.0 PG    MCHC 32.5 31.0 - 37.0 g/dL    RDW 13.5 11.6 - 14.5 %    PLATELET 235 132 - 846 K/uL    MPV 11.2 9.2 - 11.8 FL    NEUTROPHILS 60 40 - 73 %    LYMPHOCYTES 25 21 - 52 %    MONOCYTES 9 3 - 10 %    EOSINOPHILS 5 0 - 5 %    BASOPHILS 1 0 - 2 %    ABS. NEUTROPHILS 5.7 1.8 - 8.0 K/UL    ABS. LYMPHOCYTES 2.3 0.9 - 3.6 K/UL    ABS. MONOCYTES 0.9 0.05 - 1.2 K/UL    ABS. EOSINOPHILS 0.4 0.0 - 0.4 K/UL    ABS.  BASOPHILS 0.1 0.0 - 0.1 K/UL    DF AUTOMATED     METABOLIC PANEL, BASIC    Collection Time: 04/12/20  2:49 PM   Result Value Ref Range    Sodium 142 136 - 145 mmol/L    Potassium 3.8 3.5 - 5.5 mmol/L    Chloride 105 100 - 111 mmol/L    CO2 30 21 - 32 mmol/L    Anion gap 7 3.0 - 18 mmol/L    Glucose 109 (H) 74 - 99 mg/dL    BUN 15 7.0 - 18 MG/DL    Creatinine 1.01 0.6 - 1.3 MG/DL    BUN/Creatinine ratio 15 12 - 20      GFR est AA >60 >60 ml/min/1.73m2    GFR est non-AA 52 (L) >60 ml/min/1.73m2    Calcium 10.2 (H) 8.5 - 10.1 MG/DL   INFLUENZA A & B AG (RAPID TEST)    Collection Time: 04/12/20  2:49 PM   Result Value Ref Range    Influenza A Antigen Negative NEG Influenza B Antigen Negative NEG     CARDIAC PANEL,(CK, CKMB & TROPONIN)    Collection Time: 04/12/20  2:49 PM   Result Value Ref Range     26 - 192 U/L    CK - MB 5.0 (H) <3.6 ng/ml    CK-MB Index 3.0 0.0 - 4.0 %    Troponin-I, QT <0.02 0.0 - 0.045 NG/ML   LACTIC ACID    Collection Time: 04/12/20  2:49 PM   Result Value Ref Range    Lactic acid 1.8 0.4 - 2.0 MMOL/L       MDM  Number of Diagnoses or Management Options  Acute bronchitis, unspecified organism:   COPD exacerbation ([facility]): Hypoxemia:   Diagnosis management comments: Impression: Acute dyspnea with wheezing likely COPD exacerbation. Also consider acute infectious bronchitis, COVID-19, pneumonia. Respiratory panel ordered. Treated with prednisone 40 mg orally and DuoNeb.    4:24 PM  Labs reviewed. Flu negative, lactic acid within normal limits, normal white blood cell count, normal cardiac enzyme. COVID-19 pending. Patient received several nebulizer treatments as well as prednisone 40 mg. She observed several hours in the ED and continued to have waxing waning oxygen saturations dipping into the mid 80s on room air increasing into the 98 to 99% range on 3 L nasal cannula. She continued to be mildly tachypneic and dyspneic. VBG obtained with normal pH. Case discussed with Dr. Aliya Hardin, hospitalist.  Usual and customary discussion of patient's presentation, lab results, chest x-ray results, ED course ensued. Patient will be admitted to observation pending COVID-19 results. CTA chest ordered per hospitalist request with result pending at this time. Procedures    Diagnosis:   1. COPD exacerbation ([facility]    2. Acute bronchitis, unspecified organism    3.  Hypoxemia          Disposition: admit    Follow-up Information    None         Patient's Medications   Start Taking    No medications on file   Continue Taking    ALBUTEROL (PROVENTIL HFA, VENTOLIN HFA, PROAIR HFA) 90 MCG/ACTUATION INHALER    Take 2 Puffs by inhalation every four (4) hours as needed for Wheezing. ASPIRIN 81 MG CHEWABLE TABLET    Take 81 mg by mouth daily. CETIRIZINE HCL (CETIRIZINE PO)    Take 10 mg by mouth nightly. CHOLECALCIFEROL, VITAMIN D3, (VITAMIN D3) 1,000 UNIT CAP    Take 1,000 Units by mouth daily. CLONIDINE (CATAPRESS) 0.2 MG TABLET    Take 0.2 mg by mouth two (2) times a day. FAMOTIDINE (PEPCID) 20 MG TABLET    Take 1 Tab by mouth daily. GLIMEPIRIDE (AMARYL) 4 MG TABLET    Take 2 mg by mouth daily. GUAIFENESIN (MUCUS RELIEF ER) 1,200 MG TA12 ER TABLET    Take 1,200 mg by mouth two (2) times a day. LISINOPRIL (PRINIVIL, ZESTRIL) 20 MG TABLET    Take 40 mg by mouth daily. METOPROLOL (LOPRESSOR) 100 MG TABLET    Take 200 mg by mouth daily. NEBULIZER    by Does Not Apply route. PRAVASTATIN (PRAVACHOL) 40 MG TABLET    Take 40 mg by mouth daily. SODIUM CHLORIDE (SALINE MIST) 0.65 % NASAL SQUEEZE BOTTLE    0.1 mL by Both Nostrils route as needed for Congestion. Indications: stuffy nose    VERAPAMIL SR (CALAN-SR) 180 MG CR TABLET    Take 180 mg by mouth daily.      These Medications have changed    No medications on file   Stop Taking    No medications on file

## 2020-04-12 NOTE — PROGRESS NOTES
Liaison for COVID Testing    Received call from Dr. Esau Tierney regarding requested outreach to [address] [place] [address]) given concern for coronavirus infection. [facility] request form completed. [address] does not meet criteria for priority testing through [address] [place] [address]. In accordance with these recommendations coronavirus testing sent to [facility] results under novel coronavirus. Dr. Esau Tierney aware.     Jose Eduardo Merchant TATUM-BC

## 2020-04-12 NOTE — ED TRIAGE NOTES
Patient c/o cough and shortness of breath. Patient noted to have labored breathing and shortness of breath at time of triage. Sats 95% on room air.

## 2020-04-13 PROBLEM — Z20.822 SUSPECTED COVID-19 VIRUS INFECTION: Status: ACTIVE | Noted: 2020-04-13

## 2020-04-13 LAB
ALBUMIN SERPL-MCNC: 2.9 G/DL (ref 3.4–5)
ALBUMIN/GLOB SERPL: 0.7 {RATIO} (ref 0.8–1.7)
ALP SERPL-CCNC: 115 U/L (ref 45–117)
ALT SERPL-CCNC: 17 U/L (ref 13–56)
ANION GAP SERPL CALC-SCNC: 7 MMOL/L (ref 3–18)
APTT PPP: 30.2 SEC (ref 23–36.4)
AST SERPL-CCNC: 24 U/L (ref 10–38)
ATRIAL RATE: 117 BPM
B PERT DNA SPEC QL NAA+PROBE: NOT DETECTED
BASOPHILS # BLD: 0 K/UL (ref 0–0.1)
BASOPHILS NFR BLD: 0 % (ref 0–2)
BILIRUB DIRECT SERPL-MCNC: 0.1 MG/DL (ref 0–0.2)
BILIRUB SERPL-MCNC: 0.4 MG/DL (ref 0.2–1)
BORDETELLA PARAPERTUSSIS PCR, BORPAR: NOT DETECTED
BUN SERPL-MCNC: 18 MG/DL (ref 7–18)
BUN/CREAT SERPL: 17 (ref 12–20)
C PNEUM DNA SPEC QL NAA+PROBE: NOT DETECTED
CALCIUM SERPL-MCNC: 8.8 MG/DL (ref 8.5–10.1)
CALCULATED P AXIS, ECG09: 78 DEGREES
CALCULATED R AXIS, ECG10: 74 DEGREES
CALCULATED T AXIS, ECG11: 65 DEGREES
CHLORIDE SERPL-SCNC: 105 MMOL/L (ref 100–111)
CK MB CFR SERPL CALC: 2.1 % (ref 0–4)
CK MB SERPL-MCNC: 3.7 NG/ML (ref 5–25)
CK SERPL-CCNC: 175 U/L (ref 26–192)
CO2 SERPL-SCNC: 29 MMOL/L (ref 21–32)
CREAT SERPL-MCNC: 1.09 MG/DL (ref 0.6–1.3)
CRP SERPL-MCNC: 0.6 MG/DL (ref 0–0.3)
DIAGNOSIS, 93000: NORMAL
DIFFERENTIAL METHOD BLD: ABNORMAL
EOSINOPHIL # BLD: 0 K/UL (ref 0–0.4)
EOSINOPHIL NFR BLD: 0 % (ref 0–5)
ERYTHROCYTE [DISTWIDTH] IN BLOOD BY AUTOMATED COUNT: 13.8 % (ref 11.6–14.5)
EST. AVERAGE GLUCOSE BLD GHB EST-MCNC: 134 MG/DL
FERRITIN SERPL-MCNC: 173 NG/ML (ref 8–388)
FLUAV H1 2009 PAND RNA SPEC QL NAA+PROBE: NOT DETECTED
FLUAV H1 RNA SPEC QL NAA+PROBE: NOT DETECTED
FLUAV H3 RNA SPEC QL NAA+PROBE: NOT DETECTED
FLUAV SUBTYP SPEC NAA+PROBE: NOT DETECTED
FLUBV RNA SPEC QL NAA+PROBE: NOT DETECTED
GLOBULIN SER CALC-MCNC: 4 G/DL (ref 2–4)
GLUCOSE BLD STRIP.AUTO-MCNC: 137 MG/DL (ref 70–110)
GLUCOSE BLD STRIP.AUTO-MCNC: 211 MG/DL (ref 70–110)
GLUCOSE BLD STRIP.AUTO-MCNC: 77 MG/DL (ref 70–110)
GLUCOSE BLD STRIP.AUTO-MCNC: 99 MG/DL (ref 70–110)
GLUCOSE SERPL-MCNC: 228 MG/DL (ref 74–99)
HADV DNA SPEC QL NAA+PROBE: NOT DETECTED
HBA1C MFR BLD: 6.3 % (ref 4.2–5.6)
HCOV 229E RNA SPEC QL NAA+PROBE: NOT DETECTED
HCOV HKU1 RNA SPEC QL NAA+PROBE: NOT DETECTED
HCOV NL63 RNA SPEC QL NAA+PROBE: NOT DETECTED
HCOV OC43 RNA SPEC QL NAA+PROBE: NOT DETECTED
HCT VFR BLD AUTO: 36.2 % (ref 35–45)
HGB BLD-MCNC: 11.9 G/DL (ref 12–16)
HMPV RNA SPEC QL NAA+PROBE: NOT DETECTED
HPIV1 RNA SPEC QL NAA+PROBE: NOT DETECTED
HPIV2 RNA SPEC QL NAA+PROBE: NOT DETECTED
HPIV3 RNA SPEC QL NAA+PROBE: NOT DETECTED
HPIV4 RNA SPEC QL NAA+PROBE: NOT DETECTED
INR PPP: 1.2 (ref 0.8–1.2)
LDH SERPL L TO P-CCNC: 205 U/L (ref 81–234)
LYMPHOCYTES # BLD: 1.5 K/UL (ref 0.9–3.6)
LYMPHOCYTES NFR BLD: 20 % (ref 21–52)
M PNEUMO DNA SPEC QL NAA+PROBE: NOT DETECTED
MAGNESIUM SERPL-MCNC: 1.5 MG/DL (ref 1.6–2.6)
MCH RBC QN AUTO: 27.9 PG (ref 24–34)
MCHC RBC AUTO-ENTMCNC: 32.9 G/DL (ref 31–37)
MCV RBC AUTO: 84.8 FL (ref 74–97)
MONOCYTES # BLD: 0.8 K/UL (ref 0.05–1.2)
MONOCYTES NFR BLD: 11 % (ref 3–10)
NEUTS SEG # BLD: 5.2 K/UL (ref 1.8–8)
NEUTS SEG NFR BLD: 69 % (ref 40–73)
P-R INTERVAL, ECG05: 184 MS
PHOSPHATE SERPL-MCNC: 3.2 MG/DL (ref 2.5–4.9)
PLATELET # BLD AUTO: 208 K/UL (ref 135–420)
PMV BLD AUTO: 10.9 FL (ref 9.2–11.8)
POTASSIUM SERPL-SCNC: 3.9 MMOL/L (ref 3.5–5.5)
PROT SERPL-MCNC: 6.9 G/DL (ref 6.4–8.2)
PROTHROMBIN TIME: 14.8 SEC (ref 11.5–15.2)
Q-T INTERVAL, ECG07: 310 MS
QRS DURATION, ECG06: 66 MS
QTC CALCULATION (BEZET), ECG08: 432 MS
RBC # BLD AUTO: 4.27 M/UL (ref 4.2–5.3)
RSV RNA SPEC QL NAA+PROBE: NOT DETECTED
RV+EV RNA SPEC QL NAA+PROBE: NOT DETECTED
SODIUM SERPL-SCNC: 141 MMOL/L (ref 136–145)
TROPONIN I SERPL-MCNC: <0.02 NG/ML (ref 0–0.04)
VENTRICULAR RATE, ECG03: 117 BPM
WBC # BLD AUTO: 7.5 K/UL (ref 4.6–13.2)

## 2020-04-13 PROCEDURE — 83615 LACTATE (LD) (LDH) ENZYME: CPT

## 2020-04-13 PROCEDURE — 74011250636 HC RX REV CODE- 250/636: Performed by: HOSPITALIST

## 2020-04-13 PROCEDURE — 80076 HEPATIC FUNCTION PANEL: CPT

## 2020-04-13 PROCEDURE — 85610 PROTHROMBIN TIME: CPT

## 2020-04-13 PROCEDURE — 36415 COLL VENOUS BLD VENIPUNCTURE: CPT

## 2020-04-13 PROCEDURE — 96372 THER/PROPH/DIAG INJ SC/IM: CPT

## 2020-04-13 PROCEDURE — 85730 THROMBOPLASTIN TIME PARTIAL: CPT

## 2020-04-13 PROCEDURE — 96374 THER/PROPH/DIAG INJ IV PUSH: CPT

## 2020-04-13 PROCEDURE — 96375 TX/PRO/DX INJ NEW DRUG ADDON: CPT

## 2020-04-13 PROCEDURE — 83036 HEMOGLOBIN GLYCOSYLATED A1C: CPT

## 2020-04-13 PROCEDURE — 80048 BASIC METABOLIC PNL TOTAL CA: CPT

## 2020-04-13 PROCEDURE — 82962 GLUCOSE BLOOD TEST: CPT

## 2020-04-13 PROCEDURE — 74011250637 HC RX REV CODE- 250/637: Performed by: HOSPITALIST

## 2020-04-13 PROCEDURE — 85025 COMPLETE CBC W/AUTO DIFF WBC: CPT

## 2020-04-13 PROCEDURE — 82728 ASSAY OF FERRITIN: CPT

## 2020-04-13 PROCEDURE — 86140 C-REACTIVE PROTEIN: CPT

## 2020-04-13 PROCEDURE — 99218 HC RM OBSERVATION: CPT

## 2020-04-13 PROCEDURE — 74011636637 HC RX REV CODE- 636/637: Performed by: HOSPITALIST

## 2020-04-13 PROCEDURE — 74011000250 HC RX REV CODE- 250: Performed by: HOSPITALIST

## 2020-04-13 PROCEDURE — 84100 ASSAY OF PHOSPHORUS: CPT

## 2020-04-13 PROCEDURE — 83735 ASSAY OF MAGNESIUM: CPT

## 2020-04-13 PROCEDURE — 82550 ASSAY OF CK (CPK): CPT

## 2020-04-13 RX ORDER — INSULIN LISPRO 100 [IU]/ML
INJECTION, SOLUTION INTRAVENOUS; SUBCUTANEOUS
Status: DISCONTINUED | OUTPATIENT
Start: 2020-04-13 | End: 2020-04-18 | Stop reason: HOSPADM

## 2020-04-13 RX ORDER — METOPROLOL TARTRATE 50 MG/1
200 TABLET ORAL DAILY
Status: DISCONTINUED | OUTPATIENT
Start: 2020-04-13 | End: 2020-04-18 | Stop reason: HOSPADM

## 2020-04-13 RX ORDER — ALBUTEROL SULFATE 90 UG/1
2 AEROSOL, METERED RESPIRATORY (INHALATION)
Status: DISCONTINUED | OUTPATIENT
Start: 2020-04-13 | End: 2020-04-18 | Stop reason: HOSPADM

## 2020-04-13 RX ORDER — CLONIDINE HYDROCHLORIDE 0.1 MG/1
0.2 TABLET ORAL 2 TIMES DAILY
Status: DISCONTINUED | OUTPATIENT
Start: 2020-04-13 | End: 2020-04-18 | Stop reason: HOSPADM

## 2020-04-13 RX ORDER — DEXTROSE 50 % IN WATER (D50W) INTRAVENOUS SYRINGE
25-50 AS NEEDED
Status: DISCONTINUED | OUTPATIENT
Start: 2020-04-13 | End: 2020-04-18 | Stop reason: HOSPADM

## 2020-04-13 RX ORDER — GUAIFENESIN 100 MG/5ML
81 LIQUID (ML) ORAL DAILY
Status: DISCONTINUED | OUTPATIENT
Start: 2020-04-13 | End: 2020-04-18 | Stop reason: HOSPADM

## 2020-04-13 RX ORDER — MELATONIN
2000 DAILY
Status: DISCONTINUED | OUTPATIENT
Start: 2020-04-13 | End: 2020-04-18 | Stop reason: HOSPADM

## 2020-04-13 RX ORDER — SODIUM CHLORIDE 0.9 % (FLUSH) 0.9 %
5-40 SYRINGE (ML) INJECTION AS NEEDED
Status: DISCONTINUED | OUTPATIENT
Start: 2020-04-13 | End: 2020-04-18 | Stop reason: HOSPADM

## 2020-04-13 RX ORDER — LANOLIN ALCOHOL/MO/W.PET/CERES
400 CREAM (GRAM) TOPICAL DAILY
Status: COMPLETED | OUTPATIENT
Start: 2020-04-13 | End: 2020-04-17

## 2020-04-13 RX ORDER — CODEINE PHOSPHATE AND GUAIFENESIN 10; 100 MG/5ML; MG/5ML
10 SOLUTION ORAL
Status: DISCONTINUED | OUTPATIENT
Start: 2020-04-13 | End: 2020-04-18 | Stop reason: HOSPADM

## 2020-04-13 RX ORDER — HEPARIN SODIUM 5000 [USP'U]/ML
5000 INJECTION, SOLUTION INTRAVENOUS; SUBCUTANEOUS EVERY 8 HOURS
Status: DISCONTINUED | OUTPATIENT
Start: 2020-04-13 | End: 2020-04-18 | Stop reason: HOSPADM

## 2020-04-13 RX ORDER — VERAPAMIL HYDROCHLORIDE 180 MG/1
180 TABLET, EXTENDED RELEASE ORAL DAILY
Status: DISCONTINUED | OUTPATIENT
Start: 2020-04-13 | End: 2020-04-18 | Stop reason: HOSPADM

## 2020-04-13 RX ORDER — ONDANSETRON 2 MG/ML
4 INJECTION INTRAMUSCULAR; INTRAVENOUS
Status: DISCONTINUED | OUTPATIENT
Start: 2020-04-13 | End: 2020-04-18 | Stop reason: HOSPADM

## 2020-04-13 RX ORDER — PRAVASTATIN SODIUM 20 MG/1
40 TABLET ORAL DAILY
Status: DISCONTINUED | OUTPATIENT
Start: 2020-04-13 | End: 2020-04-18 | Stop reason: HOSPADM

## 2020-04-13 RX ORDER — ACETAMINOPHEN 500 MG
1000 TABLET ORAL
Status: DISCONTINUED | OUTPATIENT
Start: 2020-04-13 | End: 2020-04-13

## 2020-04-13 RX ORDER — DIPHENHYDRAMINE HYDROCHLORIDE 50 MG/ML
25 INJECTION, SOLUTION INTRAMUSCULAR; INTRAVENOUS
Status: DISCONTINUED | OUTPATIENT
Start: 2020-04-13 | End: 2020-04-18 | Stop reason: HOSPADM

## 2020-04-13 RX ORDER — GUAIFENESIN 600 MG/1
1200 TABLET, EXTENDED RELEASE ORAL 2 TIMES DAILY
Status: DISCONTINUED | OUTPATIENT
Start: 2020-04-13 | End: 2020-04-18 | Stop reason: HOSPADM

## 2020-04-13 RX ORDER — BISACODYL 5 MG
10 TABLET, DELAYED RELEASE (ENTERIC COATED) ORAL DAILY PRN
Status: DISCONTINUED | OUTPATIENT
Start: 2020-04-13 | End: 2020-04-18 | Stop reason: HOSPADM

## 2020-04-13 RX ORDER — SODIUM CHLORIDE 0.9 % (FLUSH) 0.9 %
5-40 SYRINGE (ML) INJECTION EVERY 8 HOURS
Status: DISCONTINUED | OUTPATIENT
Start: 2020-04-13 | End: 2020-04-18 | Stop reason: HOSPADM

## 2020-04-13 RX ORDER — MAGNESIUM SULFATE 100 %
4 CRYSTALS MISCELLANEOUS AS NEEDED
Status: DISCONTINUED | OUTPATIENT
Start: 2020-04-13 | End: 2020-04-18 | Stop reason: HOSPADM

## 2020-04-13 RX ORDER — FAMOTIDINE 20 MG/1
20 TABLET, FILM COATED ORAL DAILY
Status: DISCONTINUED | OUTPATIENT
Start: 2020-04-13 | End: 2020-04-18 | Stop reason: HOSPADM

## 2020-04-13 RX ORDER — MELATONIN
1000 DAILY
Status: DISCONTINUED | OUTPATIENT
Start: 2020-04-13 | End: 2020-04-13

## 2020-04-13 RX ORDER — ZINC SULFATE 50(220)MG
1 CAPSULE ORAL DAILY
Status: DISCONTINUED | OUTPATIENT
Start: 2020-04-13 | End: 2020-04-15

## 2020-04-13 RX ORDER — ACETAMINOPHEN 325 MG/1
650 TABLET ORAL
Status: DISCONTINUED | OUTPATIENT
Start: 2020-04-13 | End: 2020-04-18 | Stop reason: HOSPADM

## 2020-04-13 RX ORDER — NALOXONE HYDROCHLORIDE 0.4 MG/ML
0.4 INJECTION, SOLUTION INTRAMUSCULAR; INTRAVENOUS; SUBCUTANEOUS AS NEEDED
Status: DISCONTINUED | OUTPATIENT
Start: 2020-04-13 | End: 2020-04-18 | Stop reason: HOSPADM

## 2020-04-13 RX ADMIN — CHOLECALCIFEROL TAB 25 MCG (1000 UNIT) 2 TABLET: 25 TAB at 08:34

## 2020-04-13 RX ADMIN — Medication 10 ML: at 22:05

## 2020-04-13 RX ADMIN — GUAIFENESIN AND CODEINE PHOSPHATE 10 ML: 100; 10 SOLUTION ORAL at 03:38

## 2020-04-13 RX ADMIN — PRAVASTATIN SODIUM 40 MG: 20 TABLET ORAL at 08:34

## 2020-04-13 RX ADMIN — VERAPAMIL HYDROCHLORIDE 180 MG: 180 TABLET ORAL at 12:01

## 2020-04-13 RX ADMIN — AZITHROMYCIN MONOHYDRATE 500 MG: 500 INJECTION, POWDER, LYOPHILIZED, FOR SOLUTION INTRAVENOUS at 08:36

## 2020-04-13 RX ADMIN — HEPARIN SODIUM 5000 UNITS: 5000 INJECTION INTRAVENOUS; SUBCUTANEOUS at 05:33

## 2020-04-13 RX ADMIN — CLONIDINE HYDROCHLORIDE 0.2 MG: 0.1 TABLET ORAL at 17:44

## 2020-04-13 RX ADMIN — METOPROLOL TARTRATE 200 MG: 50 TABLET, FILM COATED ORAL at 08:34

## 2020-04-13 RX ADMIN — Medication 400 MG: at 08:34

## 2020-04-13 RX ADMIN — CLONIDINE HYDROCHLORIDE 0.2 MG: 0.1 TABLET ORAL at 08:34

## 2020-04-13 RX ADMIN — INSULIN LISPRO 4 UNITS: 100 INJECTION, SOLUTION INTRAVENOUS; SUBCUTANEOUS at 11:30

## 2020-04-13 RX ADMIN — FAMOTIDINE 20 MG: 20 TABLET ORAL at 08:34

## 2020-04-13 RX ADMIN — HEPARIN SODIUM 5000 UNITS: 5000 INJECTION INTRAVENOUS; SUBCUTANEOUS at 22:03

## 2020-04-13 RX ADMIN — Medication 10 ML: at 05:34

## 2020-04-13 RX ADMIN — GUAIFENESIN 1200 MG: 600 TABLET, EXTENDED RELEASE ORAL at 22:02

## 2020-04-13 RX ADMIN — Medication 10 ML: at 15:00

## 2020-04-13 RX ADMIN — HEPARIN SODIUM 5000 UNITS: 5000 INJECTION INTRAVENOUS; SUBCUTANEOUS at 14:39

## 2020-04-13 RX ADMIN — GUAIFENESIN 1200 MG: 600 TABLET, EXTENDED RELEASE ORAL at 08:34

## 2020-04-13 RX ADMIN — CEFTRIAXONE SODIUM 1 G: 1 INJECTION, POWDER, FOR SOLUTION INTRAMUSCULAR; INTRAVENOUS at 05:33

## 2020-04-13 RX ADMIN — ZINC SULFATE 220 MG (50 MG) CAPSULE 1 CAPSULE: CAPSULE at 08:34

## 2020-04-13 RX ADMIN — ASPIRIN 81 MG CHEWABLE TABLET 81 MG: 81 TABLET CHEWABLE at 08:34

## 2020-04-13 NOTE — PROGRESS NOTES
Could not reach pt's daughter  Cecilia Scott who is pt's emergency contact. Called pt's home [phone] and pt's son Zoraida Magana picked up and stated Baylee Cadet changed her phone number and he does not have the new number. He stated he will get in touch with Baylee Cadet and ask her to call us. Reason for Admission:  COPD exacerbation ([address] [J44.1]  COPD exacerbation ([address] [J44.1]                 RRAT Score:    15           Plan for utilizing home health: To be determined                    Likelihood of Readmission:   LOW                         Transition of Care Plan:              Initial assessment completed with patient's son Zoraida Magana. Cognitive status of patient: unable to determine. Face sheet information confirmed:  yes. The patient's daughter Cecilia Scott and pt's son Zoraida Magana [phone] participate in her discharge plan and to receive any needed information. This patient lives in a duplex home with son. Patient is able to navigate steps as needed. Prior to hospitalization, patient was considered to be independent with ADLs/IADLS : yes . Patient has a current ACP document on file: no  The son and daughter will be available to transport patient home upon discharge. The patient already has no medical equipment available in the home. Patient is not currently active with home health. Patient has not stayed in a skilled nursing facility or rehab. Was  stay within last 60 days : no. This patient is on dialysis :no    Currently, the discharge plan is to be determined    The patient states that she can obtain her medications from the pharmacy, and take her medications as directed. Patient's current insurance is [facility] Medicare      Care Management Interventions  PCP Verified by CM: Yes(per pt's son she does not know when pt saw her pcp last)  Palliative Care Criteria Met (RRAT>21 & CHF Dx)?: No  Mode of Transport at Discharge:  Other (see comment)(family)  Transition of Care Consult (CM Consult): Discharge Planning  Physical Therapy Consult: No  Occupational Therapy Consult: No  Speech Therapy Consult: No  Current Support Network:  Other(pt's son lives with her)  Confirm Follow Up Transport: Family  Discharge Location  Discharge Placement: Unable to determine at this time        AMAYA Gold RN  Care Management  Pager: [phone]

## 2020-04-13 NOTE — ED NOTES
Pt. Reassessed and states she has to use the bathroom, a bedside commode was given to the patient and the patient voided ambulatory with no complications, Pt was transported to CT scan for CTA study, pt returned and in the POC at this time. Per Dr. Salbador Green he spoke to the Radiologist Dr. Jerald Person and both agreed to perform the CTA here at [facility] prior to transfer to rule out PE. Full gown, gloves, mask, face shield, booties and scrub cap worn while caring for the patient and patient is wearing her mask. MD notified and will continue to monitor.

## 2020-04-13 NOTE — PROGRESS NOTES
Called Daughter Germain Mac [phone] to help with initial assessment of patient due to Isolation precautions of Covid-19 pending patient. Recording states the number is not in order. No answer at work number either. No other numbers on the demographics.       Goyo Mathews, RN BSN  Care Manager  [phone]

## 2020-04-13 NOTE — PROGRESS NOTES
Patient is not available to be assessed at this time. No family at bedside.     [facility] Certified [address] [place] [address]   [phone]

## 2020-04-13 NOTE — PROGRESS NOTES
Problem: Falls - Risk of  Goal: *Absence of Falls  Description: Document [place] Flow Fall Risk and appropriate interventions in the flowsheet.   Note: Fall Risk Interventions:

## 2020-04-13 NOTE — H&P
History & Physical    Patient: Doug Duque MRN: [id number]  CSN: [id number]    YOB: 1934  Age: 80 y.o. Sex: female      DOA: 4/12/2020       HPI:     Doug Duque is a 80 y.o. female with a history of COPD, DM type 2 on Amaryl, hypertension and hyperlipidemia. June 2019 hospitalized at [facility] for CAP and followed until clear with Dr Valery Hawkins. She had a flu shot 9/2019 and had ER visit for Influenza A 12/18/2019. She developed worsening cough with shortness of breath over 2 days and presented to [facility] ER. In the ED she had increased oxygen requirement O2 3l to improve tachycardia 116, pulse ox 95% RA RR 30 /90 to pulse ox 99%, RR 16 to 18. She removed O2 to use the bathroom and declined to 88% on RA. Xray with RLL infiltrate. D-Dimer 1.02;   CTA negative for definite PE, + bronchial wall thickening suggestive of acute or chronic bronchitis. She received prednisone 40 mg PO, Duo-neb and albuterol neb in the ER and was admitted to [facility] telemetry with suspicion for Covid 19. Swab was sent by the ED. Past Medical History:   Diagnosis Date    Bronchitis     Diabetes ([address]     Hyperlipemia     Hypertension     Pneumonia Jun-Jul 2019       Past Surgical History:   Procedure Laterality Date    COLONOSCOPY N/A 6/7/2017    COLONOSCOPY / polypect[facility] ENDOSCOPY    HX CATARACT REMOVAL      HX COLONOSCOPY      2011       Family History   Problem Relation Age of Onset    Heart Disease Mother     Diabetes Father     Breast Cancer Daughter        Social History     Socioeconomic History    Marital status:      Spouse name: Not on file    Number of children: Not on file    Years of education: Not on file    Highest education level: Not on file   Tobacco Use    Smoking status: Never Smoker    Smokeless tobacco: Never Used   Substance and Sexual Activity    Alcohol use: No    Drug use:  No Prior to Admission medications    Medication Sig Start Date End Date Taking? Authorizing Provider   sodium chloride (SALINE MIST) 0.65 % nasal squeeze bottle 0.1 mL by Both Nostrils route as needed for Congestion. Indications: stuffy nose 2/20/20   Tomy Hanna,    albuterol (PROVENTIL HFA, VENTOLIN HFA, PROAIR HFA) 90 mcg/actuation inhaler Take 2 Puffs by inhalation every four (4) hours as needed for Wheezing. 12/18/19   Dwayne Elliott NP   guaiFENesin (MUCUS RELIEF ER) 1,200 mg Ta12 ER tablet Take 1,200 mg by mouth two (2) times a day. Provider, Historical   famotidine (PEPCID) 20 mg tablet Take 1 Tab by mouth daily. Patient taking differently: Take 20 mg by mouth daily as needed. 7/3/19   Lee Oconnell NP   aspirin 81 mg chewable tablet Take 81 mg by mouth daily. Linda Cruz MD   CETIRIZINE HCL (CETIRIZINE PO) Take 10 mg by mouth nightly. Provider, Historical   NEBULIZER by Does Not Apply route. Provider, Historical   Cholecalciferol, Vitamin D3, (VITAMIN D3) 1,000 unit cap Take 1,000 Units by mouth daily. Linda Cruz MD   cloNIDine (CATAPRESS) 0.2 mg tablet Take 0.2 mg by mouth two (2) times a day. Linda Cruz MD   glimepiride (AMARYL) 4 mg tablet Take 2 mg by mouth daily. Linda Cruz MD   lisinopril (PRINIVIL, ZESTRIL) 20 mg tablet Take 40 mg by mouth daily. Linda Cruz MD   metoprolol (LOPRESSOR) 100 mg tablet Take 200 mg by mouth daily. Linda Cruz MD   pravastatin (PRAVACHOL) 40 mg tablet Take 40 mg by mouth daily. Linda Cruz MD   verapamil SR (CALAN-SR) 180 mg CR tablet Take 180 mg by mouth daily. Linda Cruz MD       No Known Allergies    Review of Systems:  A 9 point Review of systems was performed with pertinent positives as per HPI.        Physical Exam:      Visit Vitals  BP (!) 193/91 (BP 1 Location: Left arm)   Pulse (!) 107   Temp 98 °F (36.7 °C)   Resp 20   Ht 5' 4\" (1.626 m)   Wt 72.6 kg (160 lb)   SpO2 98%   Breastfeeding No   BMI 27.46 kg/m²       Physical Exam:  GENERAL: fatigued, cooperative, mild distress  HEENT head atraumatic, speech clear interupted with episodes of successive coughing and expectorating thin white mucous. Conjunctiva clear, anicteric, neck supple. Chest Lungs with coarse breath sounds and posterior rhonchi bilaterally.  regular  Abdome soft, BS+, non tender  Extremities ambulated to bathroom with SOB,  Moving all 4 extremities well, no edema    Lab/Data Review:  Labs: Results:       Chemistry Recent Labs     04/12/20  1449   *      K 3.8      CO2 30   BUN 15   CREA 1.01   CA 10.2*   AGAP 7   BUCR 15      CBC w/Diff Recent Labs     04/12/20  1449   WBC 9.4   RBC 5.03   HGB 13.8   HCT 42.4      GRANS 60   LYMPH 25   EOS 5      Coagulation No results for input(s): PTP, INR, APTT, INREXT in the last 72 hours. Iron/Ferritin No results for input(s): IRON in the last 72 hours. No lab exists for component: TIBCCALC   BNP No results for input(s): BNPP in the last 72 hours. Cardiac Enzymes Recent Labs     04/12/20  1449      CKND1 3.0      Liver Enzymes No results for input(s): TP, ALB, TBIL, AP, SGOT, GPT in the last 72 hours.     No lab exists for component: DBIL   Thyroid Studies No results found for: T4, T3U, TSH, TSHEXT       All Micro Results     Procedure Component Value Units Date/Time    CULTURE, BLOOD [[id number]] Collected:  04/12/20 1456    Order Status:  Completed Specimen:  Blood Updated:  04/12/20 1928    CULTURE, BLOOD [[id number]] Collected:  04/12/20 1449    Order Status:  Completed Specimen:  Blood Updated:  04/12/20 1928    EMERGENT DISEASE PANEL [[id number]] Collected:  04/12/20 1845    Order Status:  Canceled     EMERGENT DISEASE PANEL [[id number]] Collected:  04/12/20 1449    Order Status:  Completed Updated:  04/12/20 1526    INFLUENZA A & B AG (RAPID TEST) [[id number]] Collected:  04/12/20 1449    Order Status:  Completed Specimen:  Nasopharyngeal from Nasal washing Updated:  04/12/20 1526     Influenza A Antigen Negative        Comment: A negative result does not exclude influenza virus infection, seasonal or H1N1 due to suboptimal sensitivity. If influenza is circulating in your community, a diagnosis of influenza should be considered based on a patients clinical presentation and empiric antiviral treatment should be considered, if indicated. Influenza B Antigen Negative       RESPIRATORY PANEL,PCR,NASOPHARYNGEAL [[id number]] Collected:  04/12/20 1449    Order Status:  Completed Specimen:  NASOPHARYNGEAL SWAB Updated:  04/12/20 1512          Imaging Reviewed:  XR Results (most recent):  Results from Hospital Encounter encounter on 04/12/20   XR CHEST PORT    Narrative EXAMINATION: Chest single view    INDICATION: Shortness of breath, cough    COMPARISON: 2/20/2020    FINDINGS: Single frontal view the chest obtained. Underpenetration slightly  limits evaluation. Mediastinal silhouette and pulmonary vasculature  unremarkable. Aortic arch calcifications. No confluent consolidation. Right lung  base streaky densities. No evidence of pneumothorax. No obvious acute osseous  findings. Impression IMPRESSION:    No clearly acute findings. Right lung base coarse streaky densities, likely  atelectasis versus scar. CT Results (most recent):  Results from Hospital Encounter encounter on 04/12/20   CTA CHEST W OR W WO CONT    Narrative EXAM: CTA CHEST W OR W WO CONT    CLINICAL INDICATION/HISTORY : COPD exacerbation, eval for COVID pneumonia  pattern, PE.    COMPARISON: July 2, 2019    TECHNIQUE: Thin section axial images were obtained from the thoracic inlet  through the upper abdomen after the uneventful administration of IV contrast.   Utilization of smart prep dynamic bolus enhancement, timing centered for  pulmonary artery delineation.   Coronal and sagittal maximum intensity projection  (MIP) reconstructions were post-processed and utilized to better define  pulmonary artery anatomy and increase sensitivity for detecting emboli. 100 cc   Omni 350 IV    All CT scans at this facility are performed using dose optimization of technique  as appropriate to a performed exam, to include automated exposure control,  adjustment of the mA and/or kV according to patient size (including appropriate  matching for site specific examinations), or use of iterative reconstruction  technique. FINDINGS:    Lungs: Minimal stable streaky opacity in the right upper lobe likely mild  scarring. Mild diffuse bronchial wall thickening    Thyroid and chest wall: Unremarkable    Heart: There are atherosclerotic vascular calcifications. No cardiomegaly    Aorta: Atherosclerotic vascular calcification    Pleural spaces: No effusions    Pulmonary Arteries: Diagnostic evaluation of the pulmonary arteries. No  intraluminal filling defect to suggest acute pulmonary embolism. Adenopathy: Calcified mediastinal and hilar lymph nodes suggesting old  granulomatous disease      Upper abdomen: Stable left adrenal nodule. Scarring upper pole left kidney    Bones: Unremarkable for age      Impression IMPRESSION:    1. Exam is limited by patient motion. No convincing evidence for pulmonary  embolism. 2.  Mild diffuse bronchial wall thickening suggestive of acute or chronic  bronchitis. 3.  Old granulomatous disease. 4.  Stable left adrenal nodule.   5.  No findings suggestive of viral pneumonia           Assessment:   Principal Problem:    COPD exacerbation ([address] (4/12/2020)    Active Problems:    Hypoxia (6/30/2019)      Controlled type 2 diabetes mellitus, without long-term current use of insulin ([address] (6/30/2019)      Essential hypertension (6/30/2019)      Suspected Covid-19 Virus Infection (4/13/2020)          Plan:   Admit to telemetry on droplet + isolation  O2 to keep sat >94%  Rocephin and Zithromax  Monitor inflammatory markers- consider adding  hydrochloroquinine if consistent with Covid 19  Consulted Pulmonary as  It is consistent with RLL infiltrate and exacerbation COPD    POC glucose with coverage    Discussed advanced directives and patient wants all aggressive measures  Ful Sonia Prince DO  4/13/2020, 4:54 AM

## 2020-04-13 NOTE — PROGRESS NOTES
Virtual reviewer communicated change to CM, which reflect outpatient observation order written prior to Written discharge order. Code 44 delivered to patient. CM met with the patient and provided to the patient the printed information about her outpatient observation status. The patient was given the flyer entitled, \"Medicare Outpatient Observation: Information & notification. \" All questions were answered, no additional discharge needs identified at this time and patient expects to return to their (home, assisted living facility, relatives home, etc.) after discharge today. Spoke with pt's son Manohar Guadalupe on the phone. Notified Argenis Lei of VUR.       NANCY MccannN RN  Care Management  Pager: [phone]

## 2020-04-13 NOTE — ROUTINE PROCESS
Bedside and Verbal shift change report given to [address] (oncoming nurse) by Radha Zuniga RN (offgoing nurse). Report included the following information Kardex, Intake/Output, Recent Results, Med Rec Status and Cardiac Rhythm Tachycardia. Patient resting quietly in bed. Call bell and phone in reach.

## 2020-04-14 LAB
APPEARANCE UR: CLEAR
ATRIAL RATE: 61 BPM
BACTERIA URNS QL MICRO: NEGATIVE /HPF
BILIRUB UR QL: NEGATIVE
CALCULATED P AXIS, ECG09: 72 DEGREES
CALCULATED R AXIS, ECG10: 63 DEGREES
CALCULATED T AXIS, ECG11: 62 DEGREES
COLOR UR: YELLOW
DIAGNOSIS, 93000: NORMAL
EPITH CASTS URNS QL MICRO: ABNORMAL /LPF (ref 0–5)
GLUCOSE BLD STRIP.AUTO-MCNC: 108 MG/DL (ref 70–110)
GLUCOSE BLD STRIP.AUTO-MCNC: 123 MG/DL (ref 70–110)
GLUCOSE BLD STRIP.AUTO-MCNC: 131 MG/DL (ref 70–110)
GLUCOSE BLD STRIP.AUTO-MCNC: 74 MG/DL (ref 70–110)
GLUCOSE UR STRIP.AUTO-MCNC: NEGATIVE MG/DL
HGB UR QL STRIP: NEGATIVE
KETONES UR QL STRIP.AUTO: NEGATIVE MG/DL
LEUKOCYTE ESTERASE UR QL STRIP.AUTO: ABNORMAL
NITRITE UR QL STRIP.AUTO: NEGATIVE
P-R INTERVAL, ECG05: 186 MS
PH UR STRIP: 5 [PH] (ref 5–8)
PROT UR STRIP-MCNC: NEGATIVE MG/DL
Q-T INTERVAL, ECG07: 438 MS
QRS DURATION, ECG06: 74 MS
QTC CALCULATION (BEZET), ECG08: 440 MS
RBC #/AREA URNS HPF: NEGATIVE /HPF (ref 0–5)
SP GR UR REFRACTOMETRY: 1.02 (ref 1–1.03)
UROBILINOGEN UR QL STRIP.AUTO: 1 EU/DL (ref 0.2–1)
VENTRICULAR RATE, ECG03: 61 BPM
WBC URNS QL MICRO: ABNORMAL /HPF (ref 0–4)

## 2020-04-14 PROCEDURE — 77010033678 HC OXYGEN DAILY

## 2020-04-14 PROCEDURE — 74011250636 HC RX REV CODE- 250/636: Performed by: HOSPITALIST

## 2020-04-14 PROCEDURE — 99218 HC RM OBSERVATION: CPT

## 2020-04-14 PROCEDURE — 74011250637 HC RX REV CODE- 250/637: Performed by: HOSPITALIST

## 2020-04-14 PROCEDURE — 93005 ELECTROCARDIOGRAM TRACING: CPT

## 2020-04-14 PROCEDURE — 81001 URINALYSIS AUTO W/SCOPE: CPT

## 2020-04-14 PROCEDURE — 74011000250 HC RX REV CODE- 250: Performed by: HOSPITALIST

## 2020-04-14 PROCEDURE — 96372 THER/PROPH/DIAG INJ SC/IM: CPT

## 2020-04-14 PROCEDURE — 96376 TX/PRO/DX INJ SAME DRUG ADON: CPT

## 2020-04-14 PROCEDURE — 82962 GLUCOSE BLOOD TEST: CPT

## 2020-04-14 RX ADMIN — AZITHROMYCIN MONOHYDRATE 500 MG: 500 INJECTION, POWDER, LYOPHILIZED, FOR SOLUTION INTRAVENOUS at 08:19

## 2020-04-14 RX ADMIN — CEFTRIAXONE SODIUM 1 G: 1 INJECTION, POWDER, FOR SOLUTION INTRAMUSCULAR; INTRAVENOUS at 05:36

## 2020-04-14 RX ADMIN — ASPIRIN 81 MG CHEWABLE TABLET 81 MG: 81 TABLET CHEWABLE at 08:17

## 2020-04-14 RX ADMIN — GUAIFENESIN 1200 MG: 600 TABLET, EXTENDED RELEASE ORAL at 08:17

## 2020-04-14 RX ADMIN — METOPROLOL TARTRATE 200 MG: 50 TABLET, FILM COATED ORAL at 08:16

## 2020-04-14 RX ADMIN — Medication 20 ML: at 05:36

## 2020-04-14 RX ADMIN — VERAPAMIL HYDROCHLORIDE 180 MG: 180 TABLET ORAL at 18:00

## 2020-04-14 RX ADMIN — Medication 10 ML: at 14:00

## 2020-04-14 RX ADMIN — PRAVASTATIN SODIUM 40 MG: 20 TABLET ORAL at 08:16

## 2020-04-14 RX ADMIN — ZINC SULFATE 220 MG (50 MG) CAPSULE 1 CAPSULE: CAPSULE at 08:16

## 2020-04-14 RX ADMIN — GUAIFENESIN AND CODEINE PHOSPHATE 10 ML: 100; 10 SOLUTION ORAL at 21:35

## 2020-04-14 RX ADMIN — CLONIDINE HYDROCHLORIDE 0.2 MG: 0.1 TABLET ORAL at 18:00

## 2020-04-14 RX ADMIN — HEPARIN SODIUM 5000 UNITS: 5000 INJECTION INTRAVENOUS; SUBCUTANEOUS at 21:34

## 2020-04-14 RX ADMIN — GUAIFENESIN 1200 MG: 600 TABLET, EXTENDED RELEASE ORAL at 21:34

## 2020-04-14 RX ADMIN — HEPARIN SODIUM 5000 UNITS: 5000 INJECTION INTRAVENOUS; SUBCUTANEOUS at 05:35

## 2020-04-14 RX ADMIN — FAMOTIDINE 20 MG: 20 TABLET ORAL at 08:16

## 2020-04-14 RX ADMIN — CLONIDINE HYDROCHLORIDE 0.2 MG: 0.1 TABLET ORAL at 08:16

## 2020-04-14 RX ADMIN — Medication 400 MG: at 08:17

## 2020-04-14 RX ADMIN — ALBUTEROL SULFATE 2 PUFF: 90 AEROSOL, METERED RESPIRATORY (INHALATION) at 03:10

## 2020-04-14 RX ADMIN — CHOLECALCIFEROL TAB 25 MCG (1000 UNIT) 2 TABLET: 25 TAB at 08:17

## 2020-04-14 RX ADMIN — Medication 10 ML: at 21:35

## 2020-04-14 NOTE — DIABETES MGMT
Glycemic Control Plan of Care    T2DM with A1c of 6.3% (4/13/2020). Home diabetes medications: Unverified. Patient in isolation unit. Attempted to contact her room 203 phone [phone] but she did not answer. Attempted to contact her daughter, Bradley Mixon, at [phone], but number is not in service. Glimepiride 2 mg daily. POC BG range on 04/13/2020: . Noted patient received 40 mg of prednisone on 4/12 at [facility] ED. Results for Ronnell Marshall (MRN [id number]) as of 4/14/2020 16:04   Ref. Range 4/13/2020 11:05   Glucose Latest Ref Range: 74 - 99 mg/dL 228 (H)       POC BG report on 04/14/2020 at time of review: 74, 131. Recommendation(s):  1.) cont glycemic monitoring and sliding scale insulin order. Assessment:  Patient is 80year old with past medical history including type 2 diabetes mellitus, hypertension, COPD, and hyperlipidemia - was admitted on 04/12/2020 with report of worsening cough, and shortness of breath. Noted:  COPD exacerbation. Suspected COVID 19  T2DM    Most recent blood glucose values:        Current A1C:     Lab Results   Component Value Date/Time    Hemoglobin A1c 6.3 (H) 04/13/2020 11:05 AM     This lab test reflects an estimated average blood glucose of 134 mg/dL  over the past 3 months. Current hospital diabetes medications:  Correctional lispro insulin ACHS. Normal sensitivity dose. Total daily dose insulin requirement previous day: 04/13/2020:  Lispro: 4 units    Diet: Diabetic consistent carb regular.     Goals:  Blood glucose will be within target range of  mg/dL by 04/17/2020    Education:  ___  Refer to Diabetes Education Record             _X__  Education not indicated at this time    Elisabeth Zhao RN [facility]  Pager: [phone]

## 2020-04-14 NOTE — PROGRESS NOTES
[facility]  [facility]    Patient: Ralph Scott Age: 80 y.o. : 1934 MR#: [id number] SSN: [id number]  Date: 2020    Subjective/24-hour events:     Continues to complain of cough but nothing new/acute otherwise. Denies feeling SOB     Assessment:   COPD with acute exacerbation  Suspected acute bronchitis  DM 2  HTN  HLD  Advanced age    Plan:  Continue steroid, BD therapy, antibiotic therapy as ordered. PT/OT - disposition TBD. Supportive care o/w. Case discussed with:  [x]Patient  []Family  [x]Nursing  []Case Management  DVT Prophylaxis:  []Lovenox  [x]Hep SQ  []SCDs  []Coumadin   []On Heparin gtt    Objective:   VS:   Visit Vitals  /67 (BP 1 Location: Left arm, BP Patient Position: At rest)   Pulse 63   Temp 98.5 °F (36.9 °C)   Resp 18   Ht 5' 4\" (1.626 m)   Wt 72.6 kg (160 lb)   SpO2 100%   Breastfeeding No   BMI 27.46 kg/m²      Tmax/24hrs: Temp (24hrs), Av.1 °F (36.7 °C), Min:97.1 °F (36.2 °C), Max:98.5 °F (36.9 °C)      Intake/Output Summary (Last 24 hours) at 2020 1108  Last data filed at 2020 0055  Gross per 24 hour   Intake 260 ml   Output 250 ml   Net 10 ml       General:  In NAD. Nontoxic-appearing. Cardiovascular:  RRR. Pulmonary:  No wheezes, effort nonlabored. GI:  Abdomen soft, NTTP. Extremities:  Warm, no ischemia. Additional:  Awake, alert. Motor grossly nonfocal - moves extremities spontaneously.     Labs:    Recent Results (from the past 24 hour(s))   GLUCOSE, POC    Collection Time: 20 12:03 PM   Result Value Ref Range    Glucose (POC) 211 (H) 70 - 110 mg/dL   GLUCOSE, POC    Collection Time: 20  4:55 PM   Result Value Ref Range    Glucose (POC) 77 70 - 110 mg/dL   GLUCOSE, POC    Collection Time: 20 10:10 PM   Result Value Ref Range    Glucose (POC) 137 (H) 70 - 110 mg/dL   URINALYSIS W/MICROSCOPIC    Collection Time: 20 12:55 AM   Result Value Ref Range    Color YELLOW Appearance CLEAR      Specific gravity 1.023 1.005 - 1.030      pH (UA) 5.0 5.0 - 8.0      Protein Negative NEG mg/dL    Glucose Negative NEG mg/dL    Ketone Negative NEG mg/dL    Bilirubin Negative NEG      Blood Negative NEG      Urobilinogen 1.0 0.2 - 1.0 EU/dL    Nitrites Negative NEG      Leukocyte Esterase MODERATE (A) NEG      WBC 0 to 5 0 - 4 /hpf    RBC Negative 0 - 5 /hpf    Epithelial cells 1+ 0 - 5 /lpf    Bacteria Negative NEG /hpf   GLUCOSE, POC    Collection Time: 04/14/20  8:16 AM   Result Value Ref Range    Glucose (POC) 74 70 - 110 mg/dL       Signed By: Bishnu Rojas MD     April 14, 2020

## 2020-04-15 LAB
EMERGENT DISEASE PANEL, EDPR: NOT DETECTED
GLUCOSE BLD STRIP.AUTO-MCNC: 102 MG/DL (ref 70–110)
GLUCOSE BLD STRIP.AUTO-MCNC: 142 MG/DL (ref 70–110)
GLUCOSE BLD STRIP.AUTO-MCNC: 165 MG/DL (ref 70–110)
GLUCOSE BLD STRIP.AUTO-MCNC: 177 MG/DL (ref 70–110)
GLUCOSE BLD STRIP.AUTO-MCNC: 82 MG/DL (ref 70–110)

## 2020-04-15 PROCEDURE — 96372 THER/PROPH/DIAG INJ SC/IM: CPT

## 2020-04-15 PROCEDURE — 99218 HC RM OBSERVATION: CPT

## 2020-04-15 PROCEDURE — 74011250637 HC RX REV CODE- 250/637: Performed by: HOSPITALIST

## 2020-04-15 PROCEDURE — 74011250636 HC RX REV CODE- 250/636: Performed by: HOSPITALIST

## 2020-04-15 PROCEDURE — 82962 GLUCOSE BLOOD TEST: CPT

## 2020-04-15 PROCEDURE — 96376 TX/PRO/DX INJ SAME DRUG ADON: CPT

## 2020-04-15 PROCEDURE — 74011636637 HC RX REV CODE- 636/637: Performed by: HOSPITALIST

## 2020-04-15 PROCEDURE — 77010033678 HC OXYGEN DAILY

## 2020-04-15 PROCEDURE — 74011000250 HC RX REV CODE- 250: Performed by: HOSPITALIST

## 2020-04-15 RX ORDER — PREDNISONE 20 MG/1
40 TABLET ORAL
Status: DISCONTINUED | OUTPATIENT
Start: 2020-04-16 | End: 2020-04-16

## 2020-04-15 RX ADMIN — CHOLECALCIFEROL TAB 25 MCG (1000 UNIT) 2 TABLET: 25 TAB at 09:54

## 2020-04-15 RX ADMIN — HEPARIN SODIUM 5000 UNITS: 5000 INJECTION INTRAVENOUS; SUBCUTANEOUS at 12:37

## 2020-04-15 RX ADMIN — GUAIFENESIN AND CODEINE PHOSPHATE 10 ML: 100; 10 SOLUTION ORAL at 21:54

## 2020-04-15 RX ADMIN — ALBUTEROL SULFATE 2 PUFF: 90 AEROSOL, METERED RESPIRATORY (INHALATION) at 10:00

## 2020-04-15 RX ADMIN — GUAIFENESIN 1200 MG: 600 TABLET, EXTENDED RELEASE ORAL at 09:54

## 2020-04-15 RX ADMIN — ASPIRIN 81 MG CHEWABLE TABLET 81 MG: 81 TABLET CHEWABLE at 09:54

## 2020-04-15 RX ADMIN — FAMOTIDINE 20 MG: 20 TABLET ORAL at 09:54

## 2020-04-15 RX ADMIN — AZITHROMYCIN MONOHYDRATE 500 MG: 500 INJECTION, POWDER, LYOPHILIZED, FOR SOLUTION INTRAVENOUS at 09:53

## 2020-04-15 RX ADMIN — Medication 20 ML: at 05:31

## 2020-04-15 RX ADMIN — METOPROLOL TARTRATE 200 MG: 50 TABLET, FILM COATED ORAL at 09:54

## 2020-04-15 RX ADMIN — Medication 400 MG: at 09:54

## 2020-04-15 RX ADMIN — DIPHENHYDRAMINE HYDROCHLORIDE 25 MG: 50 INJECTION INTRAMUSCULAR; INTRAVENOUS at 21:55

## 2020-04-15 RX ADMIN — Medication 10 ML: at 21:32

## 2020-04-15 RX ADMIN — HEPARIN SODIUM 5000 UNITS: 5000 INJECTION INTRAVENOUS; SUBCUTANEOUS at 20:38

## 2020-04-15 RX ADMIN — Medication 10 ML: at 16:37

## 2020-04-15 RX ADMIN — ALBUTEROL SULFATE 2 PUFF: 90 AEROSOL, METERED RESPIRATORY (INHALATION) at 22:04

## 2020-04-15 RX ADMIN — GUAIFENESIN 1200 MG: 600 TABLET, EXTENDED RELEASE ORAL at 20:38

## 2020-04-15 RX ADMIN — PRAVASTATIN SODIUM 40 MG: 20 TABLET ORAL at 09:54

## 2020-04-15 RX ADMIN — HEPARIN SODIUM 5000 UNITS: 5000 INJECTION INTRAVENOUS; SUBCUTANEOUS at 05:29

## 2020-04-15 RX ADMIN — INSULIN LISPRO 2 UNITS: 100 INJECTION, SOLUTION INTRAVENOUS; SUBCUTANEOUS at 12:34

## 2020-04-15 RX ADMIN — ZINC SULFATE 220 MG (50 MG) CAPSULE 1 CAPSULE: CAPSULE at 09:54

## 2020-04-15 RX ADMIN — CEFTRIAXONE SODIUM 1 G: 1 INJECTION, POWDER, FOR SOLUTION INTRAMUSCULAR; INTRAVENOUS at 05:30

## 2020-04-15 RX ADMIN — CLONIDINE HYDROCHLORIDE 0.2 MG: 0.1 TABLET ORAL at 09:54

## 2020-04-15 RX ADMIN — CLONIDINE HYDROCHLORIDE 0.2 MG: 0.1 TABLET ORAL at 17:48

## 2020-04-15 NOTE — ROUTINE PROCESS
Bedside and Verbal shift change report given to William Boo RN (oncoming nurse) by Reza Waterman RN 
 (offgoing nurse). Report included the following information SBAR, Kardex, Procedure Summary, Intake/Output, MAR, Recent Results, Med Rec Status and Cardiac Rhythm NSR.

## 2020-04-15 NOTE — DIABETES MGMT
GLYCEMIC CONTROL PLAN OF CARE     Assessment/Recommendations:  Blood glucose slightly elevated, continue correctional Lispro insulin coverage. Will continue inpatient monitoring and intervention     Most recent blood glucose values:  4/14/2020 21:41 4/15/2020 08:07 4/15/2020 11:48 4/15/2020 12:42   108 102 177 (H) 165 (H)     Current A1C of 6.3% is equivalent to average blood glucose of 134 mg/dl over the past 2-3 months. Current hospital diabetes medications:   Correctional Lispro insulin 4 times daily ACHS    Previous day's insulin requirements:   None noted     Home diabetes medications:  Glimepiride 2 mg daily.     Diet:  Diabetic consistent carbohydrate    Education:  ____Refer to Diabetes Education Record             _x__Education not indicated at this time      Sonja Sands RD, CDE

## 2020-04-15 NOTE — ROUTINE PROCESS
Bedside shift change report given to Ludy Dietz RN (oncoming nurse) by Eva Crane RN (offgoing nurse). Report included the following information SBAR, Kardex, Intake/Output and MAR.

## 2020-04-15 NOTE — PROGRESS NOTES
Problem: Falls - Risk of  Goal: *Absence of Falls  Description: Document Sujata Villalba Fall Risk and appropriate interventions in the flowsheet. Outcome: Progressing Towards Goal  Note: Fall Risk Interventions:  Mobility Interventions: Bed/chair exit alarm    Mentation Interventions: Adequate sleep, hydration, pain control    Medication Interventions: Assess postural VS orthostatic hypotension    Elimination Interventions: Call light in reach, Patient to call for help with toileting needs    History of Falls Interventions: Bed/chair exit alarm, Room close to nurse's station, Assess for delayed presentation/identification of injury for 48 hrs (comment for end date), Vital signs minimum Q4HRs X 24 hrs (comment for end date)         Problem: Patient Education: Go to Patient Education Activity  Goal: Patient/Family Education  Outcome: Progressing Towards Goal     Problem: Risk for Spread of Infection  Goal: Prevent transmission of infectious organism to others  Description: Prevent the transmission of infectious organisms to other patients, staff members, and visitors.   Outcome: Progressing Towards Goal     Problem: Patient Education:  Go to Education Activity  Goal: Patient/Family Education  Outcome: Progressing Towards Goal     Problem: Nutrition Deficit  Goal: *Optimize nutritional status  Outcome: Progressing Towards Goal

## 2020-04-16 LAB
ANION GAP SERPL CALC-SCNC: 4 MMOL/L (ref 3–18)
BASOPHILS # BLD: 0 K/UL (ref 0–0.06)
BASOPHILS NFR BLD: 0 % (ref 0–3)
BUN SERPL-MCNC: 17 MG/DL (ref 7–18)
BUN/CREAT SERPL: 19 (ref 12–20)
CALCIUM SERPL-MCNC: 9 MG/DL (ref 8.5–10.1)
CHLORIDE SERPL-SCNC: 108 MMOL/L (ref 100–111)
CO2 SERPL-SCNC: 32 MMOL/L (ref 21–32)
CREAT SERPL-MCNC: 0.88 MG/DL (ref 0.6–1.3)
DIFFERENTIAL METHOD BLD: ABNORMAL
EOSINOPHIL # BLD: 1 K/UL (ref 0–0.4)
EOSINOPHIL NFR BLD: 10 % (ref 0–5)
ERYTHROCYTE [DISTWIDTH] IN BLOOD BY AUTOMATED COUNT: 13.5 % (ref 11.6–14.5)
GLUCOSE BLD STRIP.AUTO-MCNC: 101 MG/DL (ref 70–110)
GLUCOSE BLD STRIP.AUTO-MCNC: 132 MG/DL (ref 70–110)
GLUCOSE BLD STRIP.AUTO-MCNC: 204 MG/DL (ref 70–110)
GLUCOSE BLD STRIP.AUTO-MCNC: 219 MG/DL (ref 70–110)
GLUCOSE SERPL-MCNC: 101 MG/DL (ref 74–99)
HCT VFR BLD AUTO: 37.3 % (ref 35–45)
HGB BLD-MCNC: 12 G/DL (ref 12–16)
LYMPHOCYTES # BLD: 3.8 K/UL (ref 0.8–3.5)
LYMPHOCYTES NFR BLD: 40 % (ref 20–51)
MAGNESIUM SERPL-MCNC: 1.7 MG/DL (ref 1.6–2.6)
MCH RBC QN AUTO: 27.4 PG (ref 24–34)
MCHC RBC AUTO-ENTMCNC: 32.2 G/DL (ref 31–37)
MCV RBC AUTO: 85.2 FL (ref 74–97)
MONOCYTES # BLD: 0.7 K/UL (ref 0–1)
MONOCYTES NFR BLD: 7 % (ref 2–9)
NEUTS SEG # BLD: 4 K/UL (ref 1.8–8)
NEUTS SEG NFR BLD: 43 % (ref 42–75)
PLATELET # BLD AUTO: 189 K/UL (ref 135–420)
PLATELET COMMENTS,PCOM: ABNORMAL
PMV BLD AUTO: 10.8 FL (ref 9.2–11.8)
POTASSIUM SERPL-SCNC: 4.4 MMOL/L (ref 3.5–5.5)
RBC # BLD AUTO: 4.38 M/UL (ref 4.2–5.3)
RBC MORPH BLD: ABNORMAL
SODIUM SERPL-SCNC: 144 MMOL/L (ref 136–145)
WBC # BLD AUTO: 9.5 K/UL (ref 4.6–13.2)

## 2020-04-16 PROCEDURE — 36415 COLL VENOUS BLD VENIPUNCTURE: CPT

## 2020-04-16 PROCEDURE — 97535 SELF CARE MNGMENT TRAINING: CPT

## 2020-04-16 PROCEDURE — 96376 TX/PRO/DX INJ SAME DRUG ADON: CPT

## 2020-04-16 PROCEDURE — 74011250636 HC RX REV CODE- 250/636: Performed by: FAMILY MEDICINE

## 2020-04-16 PROCEDURE — 77010033678 HC OXYGEN DAILY

## 2020-04-16 PROCEDURE — 80048 BASIC METABOLIC PNL TOTAL CA: CPT

## 2020-04-16 PROCEDURE — 74011250636 HC RX REV CODE- 250/636: Performed by: INTERNAL MEDICINE

## 2020-04-16 PROCEDURE — 82962 GLUCOSE BLOOD TEST: CPT

## 2020-04-16 PROCEDURE — 74011636637 HC RX REV CODE- 636/637: Performed by: FAMILY MEDICINE

## 2020-04-16 PROCEDURE — 74011250637 HC RX REV CODE- 250/637: Performed by: HOSPITALIST

## 2020-04-16 PROCEDURE — 74011636637 HC RX REV CODE- 636/637: Performed by: HOSPITALIST

## 2020-04-16 PROCEDURE — 96372 THER/PROPH/DIAG INJ SC/IM: CPT

## 2020-04-16 PROCEDURE — 97165 OT EVAL LOW COMPLEX 30 MIN: CPT

## 2020-04-16 PROCEDURE — 97530 THERAPEUTIC ACTIVITIES: CPT

## 2020-04-16 PROCEDURE — 94640 AIRWAY INHALATION TREATMENT: CPT

## 2020-04-16 PROCEDURE — 96375 TX/PRO/DX INJ NEW DRUG ADDON: CPT

## 2020-04-16 PROCEDURE — 97161 PT EVAL LOW COMPLEX 20 MIN: CPT

## 2020-04-16 PROCEDURE — 83735 ASSAY OF MAGNESIUM: CPT

## 2020-04-16 PROCEDURE — 74011000250 HC RX REV CODE- 250: Performed by: INTERNAL MEDICINE

## 2020-04-16 PROCEDURE — 74011250636 HC RX REV CODE- 250/636: Performed by: HOSPITALIST

## 2020-04-16 PROCEDURE — 85025 COMPLETE CBC W/AUTO DIFF WBC: CPT

## 2020-04-16 PROCEDURE — 99218 HC RM OBSERVATION: CPT

## 2020-04-16 RX ORDER — IPRATROPIUM BROMIDE AND ALBUTEROL SULFATE 2.5; .5 MG/3ML; MG/3ML
3 SOLUTION RESPIRATORY (INHALATION)
Status: DISCONTINUED | OUTPATIENT
Start: 2020-04-16 | End: 2020-04-18 | Stop reason: HOSPADM

## 2020-04-16 RX ADMIN — CLONIDINE HYDROCHLORIDE 0.2 MG: 0.1 TABLET ORAL at 17:03

## 2020-04-16 RX ADMIN — FAMOTIDINE 20 MG: 20 TABLET ORAL at 09:11

## 2020-04-16 RX ADMIN — INSULIN LISPRO 4 UNITS: 100 INJECTION, SOLUTION INTRAVENOUS; SUBCUTANEOUS at 16:51

## 2020-04-16 RX ADMIN — VERAPAMIL HYDROCHLORIDE 180 MG: 180 TABLET ORAL at 09:00

## 2020-04-16 RX ADMIN — Medication 10 ML: at 22:20

## 2020-04-16 RX ADMIN — HEPARIN SODIUM 5000 UNITS: 5000 INJECTION INTRAVENOUS; SUBCUTANEOUS at 22:19

## 2020-04-16 RX ADMIN — INSULIN LISPRO 4 UNITS: 100 INJECTION, SOLUTION INTRAVENOUS; SUBCUTANEOUS at 22:07

## 2020-04-16 RX ADMIN — Medication 400 MG: at 09:11

## 2020-04-16 RX ADMIN — GUAIFENESIN 1200 MG: 600 TABLET, EXTENDED RELEASE ORAL at 22:19

## 2020-04-16 RX ADMIN — PREDNISONE 40 MG: 20 TABLET ORAL at 09:11

## 2020-04-16 RX ADMIN — Medication 10 ML: at 15:11

## 2020-04-16 RX ADMIN — ASPIRIN 81 MG CHEWABLE TABLET 81 MG: 81 TABLET CHEWABLE at 09:11

## 2020-04-16 RX ADMIN — Medication 10 ML: at 05:34

## 2020-04-16 RX ADMIN — GUAIFENESIN AND CODEINE PHOSPHATE 10 ML: 100; 10 SOLUTION ORAL at 14:59

## 2020-04-16 RX ADMIN — CLONIDINE HYDROCHLORIDE 0.2 MG: 0.1 TABLET ORAL at 09:11

## 2020-04-16 RX ADMIN — HEPARIN SODIUM 5000 UNITS: 5000 INJECTION INTRAVENOUS; SUBCUTANEOUS at 05:33

## 2020-04-16 RX ADMIN — METHYLPREDNISOLONE SODIUM SUCCINATE 40 MG: 40 INJECTION, POWDER, FOR SOLUTION INTRAMUSCULAR; INTRAVENOUS at 17:42

## 2020-04-16 RX ADMIN — IPRATROPIUM BROMIDE AND ALBUTEROL SULFATE 3 ML: .5; 3 SOLUTION RESPIRATORY (INHALATION) at 19:49

## 2020-04-16 RX ADMIN — HEPARIN SODIUM 5000 UNITS: 5000 INJECTION INTRAVENOUS; SUBCUTANEOUS at 12:20

## 2020-04-16 RX ADMIN — AZITHROMYCIN MONOHYDRATE 500 MG: 500 INJECTION, POWDER, LYOPHILIZED, FOR SOLUTION INTRAVENOUS at 09:27

## 2020-04-16 RX ADMIN — CHOLECALCIFEROL TAB 25 MCG (1000 UNIT) 2 TABLET: 25 TAB at 09:11

## 2020-04-16 RX ADMIN — METHYLPREDNISOLONE SODIUM SUCCINATE 40 MG: 40 INJECTION, POWDER, FOR SOLUTION INTRAMUSCULAR; INTRAVENOUS at 22:19

## 2020-04-16 RX ADMIN — GUAIFENESIN 1200 MG: 600 TABLET, EXTENDED RELEASE ORAL at 09:12

## 2020-04-16 RX ADMIN — PRAVASTATIN SODIUM 40 MG: 20 TABLET ORAL at 09:11

## 2020-04-16 RX ADMIN — METOPROLOL TARTRATE 200 MG: 50 TABLET, FILM COATED ORAL at 09:11

## 2020-04-16 NOTE — PROGRESS NOTES
NUTRITION    Nursing Referral: [facility]     RECOMMENDATIONS / PLAN:     - Modify supplement: increase Glucerna Shake to TID  - Encourage po intake of meals/supplements  - Continue RD inpatient monitoring and evaluation. NUTRITION INTERVENTIONS & DIAGNOSIS:     - Meals/snacks: modified composition  - Medical food supplement therapy: modify    Nutrition Diagnosis: Unintended weight loss related to predicted prolonged inadequate energy intake as evidenced by wt loss of 14% x 5 years PTA    ASSESSMENT:     4/16: Pt reported good appetite; fair/good meal intake. Tolerating diet; eats as much as she can. Likes/drinks Glucerna Shakes. Po intake meals/ supplements encouraged    4/13: Per chart review, pt admitted with c/o worsening cough and SOB; COPD exacerbation and suspected COVID-19 virus infection. Remote assessment via telephone attempted; unsuccessful. Per chart, pt had poor po intake of breakfast today; noted wt loss in last 5 years per chart hx.     Nutritional intake adequate to meet patients estimated nutritional needs:  No    Diet: DIET DIABETIC CONSISTENT CARB Regular  DIET NUTRITIONAL SUPPLEMENTS Breakfast, Dinner; Davey Shanon 57 Allergies: none known   Current Appetite: Good  Appetite/meal intake prior to admission: Unable to determine at this time  Feeding Limitations:  [] Swallowing difficulty    [] Chewing difficulty    [] Other:  Current Meal Intake:   Patient Vitals for the past 100 hrs:   % Diet Eaten   04/16/20 0939 100 %   04/13/20 0935 25 %       BM: 4/15  Skin Integrity:  No pressure injury or wound noted  Edema:   [x] No     [] Yes   Pertinent Medications: Reviewed: bowel regimen, cholecalciferol, famotidine, SSI, mag-ox, ondansetron, steroid    Recent Labs     04/16/20  0348      K 4.4      CO2 32   *   BUN 17   CREA 0.88   CA 9.0   MG 1.7       Intake/Output Summary (Last 24 hours) at 4/16/2020 1236  Last data filed at 4/16/2020 0939  Gross per 24 hour   Intake 240 ml Output 800 ml   Net -560 ml       Anthropometrics:  Ht Readings from Last 1 Encounters:   04/12/20 5' 4\" (1.626 m)     Last 3 Recorded Weights in this Encounter    04/12/20 1425   Weight: 72.6 kg (160 lb)     Body mass index is 27.46 kg/m². Weight History:  -26 lb, 14% x 5 years PTA per chart hx    Weight Metrics 4/12/2020 2/20/2020 12/18/2019 8/26/2019 7/11/2019 7/3/2019 2/2/2019   Weight 160 lb 160 lb 170 lb 171 lb 3.2 oz 168 lb 8 oz 166 lb 9.6 oz 168 lb   BMI 27.46 kg/m2 27.46 kg/m2 29.18 kg/m2 29.39 kg/m2 28.92 kg/m2 28.6 kg/m2 28.84 kg/m2        Admitting Diagnosis: COPD exacerbation (HCC) [J44.1]  COPD exacerbation (HCC) [J44.1]  Pertinent PMHx: DM, HTN, HLD, pneumonia, COPD    Education Needs:        [x] None identified  [] Identified - Not appropriate at this time  []  Identified and addressed - refer to education log  Learning Limitations:   [x] None identified  [] Identified    Cultural, Hindu & ethnic food preferences:  [x] None identified    [] Identified and addressed     ESTIMATED NUTRITION NEEDS:     Calories: 1575-8392 kcal (25-30 kcal/kg) based on  [x] Actual BW: 73 kg      [] IBW   Protein:  gm (1-1.5 gm/kg) based on  [x] Actual BW      [] IBW   Fluid: 1 mL/kcal     MONITORING & EVALUATION:     Nutrition Goal(s):   - PO nutrition intake will meet >75% of patient estimated nutritional needs within the next 7 days. Outcome: Progressing towards goal     Monitoring:   [x] Food and nutrient intake   [x] Food and nutrient administration  [x] Comparative standards   [x] Nutrition-focused physical findings   [x] Anthropometric Measurements   [x] Treatment/therapy   [x] Biochemical data, medical tests, and procedures        Previous Recommendations (for follow-up assessments only): Implemented      Discharge Planning: No nutritional discharge needs at this time. Participated in care planning, discharge planning, & interdisciplinary rounds as appropriate.       Rafa Estevez, AMY  Pager: [phone]

## 2020-04-16 NOTE — PROGRESS NOTES
Problem: Self Care Deficits Care Plan (Adult)  Goal: *Acute Goals and Plan of Care (Insert Text)  Outcome: Resolved/Met     OCCUPATIONAL THERAPY EVALUATION/DISCHARGE    Patient: Nemesio Ledesma (89 y.o. female)  Date: 4/16/2020  Primary Diagnosis: COPD exacerbation ([facility] [J44.1]  COPD exacerbation ([facility] [J44.1]  Precautions:  Fall  PLOF: Patient was independent with self-care and functional mobility PTA. ASSESSMENT AND RECOMMENDATIONS:  Upon entering the room, patient was alert, and agreeable to participate in OT evaluation. Patient educated on role of OT, evaluation process, and safety around the house/during this admission. Patient is stand by assist - independent with basic self-care and stand by assist with functional transfers. Patient left seated in chair this session, all needs met, and lunch tray present. Based on the objective data described below, the patient presents with no deficits that impede pt function with ADLs, functional transfers, and functional mobility. At this time patient is safe to d/c home with family support. OT to d/c from caseload at this time. Skilled occupational therapy is not indicated at this time.   Discharge Recommendations: Home Health Safety Eval  Further Equipment Recommendations for Discharge: N/A      SUBJECTIVE:   Patient stated I have a friend that used to walk all the time like me, she's 8 years old    OBJECTIVE DATA SUMMARY:     Past Medical History:   Diagnosis Date    Bronchitis     Diabetes ([facility]     Hyperlipemia     Hypertension     Pneumonia Jun-Jul 2019     Past Surgical History:   Procedure Laterality Date    COLONOSCOPY N/A 6/7/2017    COLONOSCOPY / polypectomy performed by Stuart Garcia MD at [facility] ENDOSCOPY    HX CATARACT REMOVAL      HX COLONOSCOPY      2011     Barriers to Learning/Limitations: None  Compensate with: visual, verbal, tactile, kinesthetic cues/model    Home Situation:   Home Situation  Home Environment: Private residence  # Steps to Enter: 0  One/Two Story Residence: One story  Living Alone: No  Support Systems: Child(wiley)  Patient Expects to be Discharged to[de-identified] Private residence  Current DME Used/Available at Home: None  Tub or Shower Type: Shower  [x]     Right hand dominant   []     Left hand dominant    Cognitive/Behavioral Status:  Neurologic State: Alert  Orientation Level: Oriented X4  Cognition: Follows commands;Decreased attention/concentration  Safety/Judgement: Fall prevention    Skin: Intact  Edema: None noted    Vision/Perceptual:    Acuity: Within Defined Limits      Coordination: BUE  Fine Motor Skills-Upper: Left Intact; Right Intact    Gross Motor Skills-Upper: Left Intact; Right Intact    Balance:  Sitting: Intact  Standing: Impaired; Without support  Standing - Static: Good  Standing - Dynamic : Fair    Strength: BUE  Strength: Generally decreased, functional    Tone & Sensation: BUE  Tone: Normal  Sensation: Intact    Range of Motion: BUE  AROM: Within functional limits    Functional Mobility and Transfers for ADLs:      Transfers:  Sit to Stand: Stand-by assistance  Stand to Sit: Stand-by assistance   Toilet Transfer : Stand-by assistance    ADL Assessment:  Feeding: Independent    Oral Facial Hygiene/Grooming: Independent    Bathing: Stand-by assistance    Upper Body Dressing: Independent    Lower Body Dressing: Stand-by assistance    Toileting: Stand by assistance    ADL Intervention:  Grooming  Grooming Assistance: Independent  Position Performed: Standing(at sink)  Washing Hands: Independent    Lower Body Dressing Assistance  Dressing Assistance: Stand-by assistance  Socks: Stand-by assistance  Leg Crossed Method Used: Yes  Position Performed: Seated in chair    Toileting  Toileting Assistance: Independent(SBA for transfer)  Bladder Hygiene: Independent    Cognitive Retraining  Safety/Judgement: Fall prevention    Pain:  Pain level pre-treatment: 0/10   Pain level post-treatment: 0/10   Pain Intervention(s): Medication (see MAR); Response to intervention: Nurse notified, See doc flow    Activity Tolerance:   Good    Please refer to the flowsheet for vital signs taken during this treatment. After treatment:   [x]  Patient left in no apparent distress sitting up in chair  []  Patient left in no apparent distress in bed  [x]  Call bell left within reach  [x]  Nursing notified  []  Caregiver present  []  Bed alarm activated    COMMUNICATION/EDUCATION:   [x]      Role of Occupational Therapy in the acute care setting  [x]      Home safety education was provided and the patient/caregiver indicated understanding. [x]      Patient/family have participated as able and agree with findings and recommendations. []      Patient is unable to participate in plan of care at this time. Thank you for this referral.  Dominik Chiu, OTR/L  Time Calculation: 23 mins      Eval Complexity: History: MEDIUM Complexity : Expanded review of history including physical, cognitive and psychosocial  history ; Examination: LOW Complexity : 1-3 performance deficits relating to physical, cognitive , or psychosocial skils that result in activity limitations and / or participation restrictions ;    Decision Making:LOW Complexity : No comorbidities that affect functional and no verbal or physical assistance needed to complete eval tasks

## 2020-04-16 NOTE — PROGRESS NOTES
Bedside shift change report given to [address] [place] [address] and [facility] (oncoming nurse) by David Driscoll (offgoing nurse). Report included the following information SBAR, Kardex, MAR and Recent Results.

## 2020-04-16 NOTE — PROGRESS NOTES
Report called to Harriett SANCHEZ on 4N. 1650  pt transferred to 4 N per w/c  With portable 02. No distress noted at time of transfer.

## 2020-04-16 NOTE — PROGRESS NOTES
1700 02 saturation room air at rest (sitting) 97%. 1705 Ambuated pt. in hallway X1. 02 Saturation (ambulating) on room air 86%. 1710 02 saturation after ambulation (recovery)sitting on room air 93%.

## 2020-04-16 NOTE — PROGRESS NOTES
Problem: Falls - Risk of  Goal: *Absence of Falls  Description: Document [place] Fall Risk and appropriate interventions in the flowsheet.   Outcome: Progressing Towards Goal  Note: Fall Risk Interventions:  Mobility Interventions: Patient to call before getting OOB    Mentation Interventions: Adequate sleep, hydration, pain control    Medication Interventions: Patient to call before getting OOB, Teach patient to arise slowly    Elimination Interventions: Call light in reach, Patient to call for help with toileting needs    History of Falls Interventions: Bed/chair exit alarm, Room close to nurse's station, Assess for delayed presentation/identification of injury for 48 hrs (comment for end date), Vital signs minimum Q4HRs X 24 hrs (comment for end date)

## 2020-04-16 NOTE — PROGRESS NOTES
Bedside shift change report given to Michael Rod (oncoming nurse) by Ruthy Nunez RN (offgoing nurse). Report included the following information SBAR, Kardex and MAR.

## 2020-04-16 NOTE — PROGRESS NOTES
Progress Note         Patient: Dominick Alfaro MRN: [id number]  CSN: [id number]    YOB: 1934  Age: 80 y.o. Sex: female    DOA: 4/12/2020 LOS:  LOS: 1 day                    Subjective:     Dominick Alfaro is a 80 y.o. female with a PMHx of COPD, Type II DM, HTN and HLD who is admitted for COPD exacerbation. Comfortable in room   Breathing improved but not back to baseline   C/o SOB and cough   Requiring O2 at 3 LPM   Denies CP       Objective:     Physical Exam:  Visit Vitals  /77 (BP 1 Location: Left arm, BP Patient Position: At rest)   Pulse 83   Temp 98.3 °F (36.8 °C)   Resp 20   Ht 5' 4\" (1.626 m)   Wt 72.6 kg (160 lb)   SpO2 100%   Breastfeeding No   BMI 27.46 kg/m²        General:         Alert, cooperative, no acute distress    HEENT: NC, Atraumatic. Anicteric sclerae. Lungs: Light expiratory wheezing, scattered rhonchi   Heart:  Regular  rhythm,  No murmur, No Rubs, No Gallops  Abdomen: Soft, Non distended, Non tender. +Bowel sounds, no HSM  Extremities: No c/c/e  Psych:   Not anxious or agitated. Neurologic:  Alert and oriented X 3. No acute neurological deficits. Intake and Output:  Current Shift:  No intake/output data recorded.   Last three shifts:  04/14 0701 - 04/15 1900  In: 270 [I.V.:270]  Out: 1001 [Urine:1000]    Labs: Results:       Chemistry Recent Labs     04/13/20  1105   *      K 3.9      CO2 29   BUN 18   CREA 1.09   CA 8.8   AGAP 7   BUCR 17      TP 6.9   ALB 2.9*   GLOB 4.0   AGRAT 0.7*      CBC w/Diff Recent Labs     04/13/20  1105   WBC 7.5   RBC 4.27   HGB 11.9*   HCT 36.2      GRANS 69   LYMPH 20*   EOS 0      Cardiac Enzymes Recent Labs     04/13/20  1105      CKND1 2.1      Coagulation Recent Labs     04/13/20  1105   PTP 14.8   INR 1.2   APTT 30.2       Lipid Panel No results found for: CHOL, CHOLPOCT, CHOLX, CHLST, CHOLV, 529876, HDL, HDLP, LDL, LDLC, DLDLP, 330685, VLDLC, VLDL, TGLX, TRIGL, TRIGP, TGLPOCT, CHHD, CHHDX   BNP No results for input(s): BNPP in the last 72 hours. Liver Enzymes Recent Labs     04/13/20  1105   TP 6.9   ALB 2.9*      SGOT 24      Thyroid Studies No results found for: T4, T3U, TSH, TSHEXT             Assessment and Plan:     Doug Duque is a 80 y.o. female with a PMHx of COPD, Type II DM, HTN and HLD who is admitted for COPD exacerbation. 1. Acute hypoxic respiratory failure   2. COPD exacerbation   3. Hypoxia   4. Hypomagnesemia   5. Type II DM   6. HTN   7. HLD     COVID-19 testing negative   Switch abx from ceftriazone to azithromycin   Start Prednisone daily   Continue albuterol MDI, mucinex   SSI w/accuchecks   Cont home meds: ASA, Vit D, clonidine, pepcid, metoprolol, pravastatin, verapamil   FU am labs  Consult to pulmonary rehab   PT, OT     Case discussed with:  [x]Patient  []Family  [x]Nursing  []Case Management  DVT prophylaxis: SQH   Diet: Diabetic   Code Status: FULL   Disposition: Continue current care; likely home tomorrow       GREGORY Chavez DO  4/15/2020

## 2020-04-16 NOTE — PROGRESS NOTES
Called pt's home and spoke with her son Carley Harrison. He gave me his sister Keira's number [phone]. Called and left message for Faye cMarthur concerning pt/ot recommendation of home health.       AMAYA Patel RN  Care Management  Pager: [phone]

## 2020-04-16 NOTE — DIABETES MGMT
GLYCEMIC CONTROL PLAN OF CARE     Assessment/Recommendations:  Fasting lab glucose this am 101 mg/dl  Noted pt receiving steroids  Noted pt started on lispro corrective insulin coverage as needed  Will continue inpatient monitoring. Most recent blood glucose values:  Results for Chayo Olmos (MRN [id number]) as of 4/16/2020 10:21   Ref. Range 4/15/2020 11:48 4/15/2020 12:42 4/15/2020 16:03 4/15/2020 21:32 4/16/2020 07:47   GLUCOSE,FAST - POC Latest Ref Range: 70 - 110 mg/dL 177 (H) 165 (H) 82 142 (H) 101     Current A1C of 6.3 % is equivalent to average blood glucose of 134 mg/dl over the past 2-3 months. Current hospital diabetes medications:   Lispro corrective insulin coverage AC&HS  Previous day's insulin requirements:   lispro corrective insulin coverage   Home diabetes medications:  Verified with pts list from home. Amaryl 4 mg daily  Diet:    Diabetic consistent carb with supplements  Education:  ____Refer to Diabetes Education Record             _x__Education not indicated at this time  A1C 6.3%. Appropriate for age. No issues of hyper or hypoglycemia at home per pt.     [place] TRANSPLANT [facility]

## 2020-04-16 NOTE — PROGRESS NOTES
Problem: Mobility Impaired (Adult and Pediatric)  Goal: *Acute Goals and Plan of Care (Insert Text)  Description: Physical Therapy Goals  Initiated 4/16/2020 and to be accomplished within 7 day(s)  1.  Patient will participate in walking functional maintenance program 3-5x per week with rehab tech to maintain current level of mobility.     PLOF: Pt reports she is independent with self care and functional mobility without AD. She lives with her daughter in single story home.      Outcome: Progressing Towards Goal     PHYSICAL THERAPY EVALUATION    Patient: Rose Marie Garrison (86 y.o. female)  Date: 4/16/2020  Primary Diagnosis: COPD exacerbation (HCC) [J44.1]  COPD exacerbation (HCC) [J44.1]        Precautions:   Fall      ASSESSMENT :  Patient semi reclined in bed agreeable to skilled PT evaluation. Educated pt on role of PT. Pt expresses she is independent at home with self-care and mobility. Pt performs bed mobility, transfers, and gait with stand-by level of assistance. She demos good static standing balance while discussing her activity level at home. She walks inside her home for exercise and will go outside weather permitting. Patient is very talkative throughout session. Pt ambulates in hallway without AD with SBA, demos fair balance, slightly flexed posture, and decreased B step length. She denies SOB with gait. Educated pt on home safety and use of call; she verbalizes understanding.  Patient will benefit from walking functional maintenance program to maintain current level of mobility. Pt left sitting EOB with nursing present at bedside.     Patient will benefit from skilled intervention to address the above impairments.  Patient's rehabilitation potential is considered to be Good  Factors which may influence rehabilitation potential include:   [x]         None noted  []         Mental ability/status  []         Medical condition  []         Home/family situation and support systems  []          Safety awareness  []         Pain tolerance/management  []         Other:      PLAN :  Recommendations and Planned Interventions:   []           Bed Mobility Training             [x]    Neuromuscular Re-Education  []           Transfer Training                   []    Orthotic/Prosthetic Training  [x]           Gait Training                          []    Modalities  [x]           Therapeutic Exercises           []    Edema Management/Control  [x]           Therapeutic Activities            []    Family Training/Education  [x]           Patient Education  []           Other (comment):    Frequency/Duration: Patient will be followed by rehab tech 3-5 times a week to address goals, weekly by PT. Discharge Recommendations: Home Health  Further Equipment Recommendations for Discharge: N/A     SUBJECTIVE:   Patient stated I am a busy body.     OBJECTIVE DATA SUMMARY:     Past Medical History:   Diagnosis Date    Bronchitis     Diabetes ([facility] [address]     Hyperlipemia     Hypertension     Pneumonia Jun-Jul 2019     Past Surgical History:   Procedure Laterality Date    COLONOSCOPY N/A 6/7/2017    COLONOSCOPY / polypectomy performed by Lois Canseco MD at [facility] ENDOSCOPY    HX CATARACT REMOVAL      HX COLONOSCOPY      2011     Barriers to Learning/Limitations: None  Compensate with: N/A  Home Situation:  Home Situation  Home Environment: Private residence  # Steps to Enter: 0  One/Two Story Residence: One story  Living Alone: No  Support Systems: Child(wiley)  Patient Expects to be Discharged to[de-identified] Private residence  Current DME Used/Available at Home: None  Critical Behavior:  Neurologic State: Alert  Orientation Level: Oriented X4  Cognition: Follows commands; Appropriate for age attention/concentration  Safety/Judgement: Fall prevention;Home safety    Strength:    Strength:  Within functional limits     Tone & Sensation:   Tone: Normal  Sensation: Intact    Functional Mobility:  Bed Mobility:  Supine to Sit: Supervision  Scooting: Supervision    Transfers:  Sit to Stand: Stand-by assistance  Stand to Sit: Stand-by assistance    Balance:   Sitting: Intact  Standing: Without support  Standing - Static: Good  Standing - Dynamic : Fair    Ambulation/Gait Training:  Distance (ft): 80 Feet (ft)  Assistive Device: (none)  Ambulation - Level of Assistance: Stand-by assistance  Speed/Jennifer: Slow  Step Length: Left shortened;Right shortened  Interventions: Safety awareness training;Verbal cues     Pain:  Pain level pre-treatment: 0/10   Pain level post-treatment: 0/10       Activity Tolerance:   Good  Please refer to the flowsheet for vital signs taken during this treatment. After treatment:   []         Patient left in no apparent distress sitting up in chair  [x]         Patient left in no apparent distress siting EOB  [x]         Call bell left within reach  [x]         Nursing notified  []         Caregiver present  []         Bed alarm activated  []         SCDs applied    COMMUNICATION/EDUCATION:   [x]         Role of Physical Therapy in the acute care setting. [x]         Fall prevention education was provided and the patient/caregiver indicated understanding. [x]         Patient/family have participated as able in goal setting and plan of care. []         Patient/family agree to work toward stated goals and plan of care. []         Patient understands intent and goals of therapy, but is neutral about his/her participation. []         Patient is unable to participate in goal setting/plan of care: ongoing with therapy staff.  []         Other:     Thank you for this referral.  Shashi Larios, PT   Time Calculation: 32 mins      Eval Complexity: History: LOW Complexity : Zero comorbidities / personal factors that will impact the outcome / POCExam:LOW Complexity : 1-2 Standardized tests and measures addressing body structure, function, activity limitation and / or participation in recreation  Presentation: LOW Complexity : Stable, uncomplicated  Clinical Decision Making:Low Complexity    Overall Complexity:LOW

## 2020-04-16 NOTE — PROGRESS NOTES
[facility] Hospitalist Group  Progress Note    Patient: Alfonzo Ferreira Age: 80 y.o. : 1934 MR#: [id number] SSN: [id number]  Date/Time: 2020    Subjective:   Pt has no specific complaints. Assessment/Plan:   80 y o female w/ h/o COPD among other medical problems.    -Acute hypoxic respiratory failure/COPD exacerbation. Pt on 02 supplement, does not wear 02 at home.  Here remains 02 dependent w/ ambulation, on prednisone and PRN neb rx, wheezing on exam. COVID-19 negative  -Hypomagnesemia- resolved    HISTORY OF:  -Type II DM  -HTN  -HLD    PLAN:  -change prednisone to IV  -schedule neb rx  -cont azithromycin      Additional Notes:      Case discussed with:  [x]Patient  []Family  [x]Nursing  []Case Management  DVT Prophylaxis:  []Lovenox  [x]Hep SQ  []SCDs  []Coumadin   []On Heparin gtt    Objective:   VS:   Visit Vitals  /65 (BP 1 Location: Left arm, BP Patient Position: At rest)   Pulse 64   Temp 98.1 °F (36.7 °C)   Resp 18   Ht 5' 4\" (1.626 m)   Wt 72.6 kg (160 lb)   SpO2 98%   Breastfeeding No   BMI 27.46 kg/m²      Tmax/24hrs: Temp (24hrs), Av.9 °F (36.6 °C), Min:96.5 °F (35.8 °C), Max:99 °F (37.2 °C)    Input/Output:     Intake/Output Summary (Last 24 hours) at 2020 1643  Last data filed at 2020 0939  Gross per 24 hour   Intake 240 ml   Output 800 ml   Net -560 ml       General:alert, awake, in NAD    Cardiovascular:  RRR, no murmurs  Pulmonary:  Diffuse wheezing  GI:  Soft, nt, nd  Extremities:  No edema  Additional:      Labs:    Recent Results (from the past 24 hour(s))   GLUCOSE, POC    Collection Time: 04/15/20  9:32 PM   Result Value Ref Range    Glucose (POC) 142 (H) 70 - 110 mg/dL   CBC WITH AUTOMATED DIFF    Collection Time: 20  3:48 AM   Result Value Ref Range    WBC 9.5 4.6 - 13.2 K/uL    RBC 4.38 4.20 - 5.30 M/uL    HGB 12.0 12.0 - 16.0 g/dL    HCT 37.3 35.0 - 45.0 %    MCV 85.2 74.0 - 97.0 FL    MCH 27.4 24.0 - 34.0 PG MCHC 32.2 31.0 - 37.0 g/dL    RDW 13.5 11.6 - 14.5 %    PLATELET 841 631 - 381 K/uL    MPV 10.8 9.2 - 11.8 FL    NEUTROPHILS 43 42 - 75 %    LYMPHOCYTES 40 20 - 51 %    MONOCYTES 7 2 - 9 %    EOSINOPHILS 10 (H) 0 - 5 %    BASOPHILS 0 0 - 3 %    ABS. NEUTROPHILS 4.0 1.8 - 8.0 K/UL    ABS. LYMPHOCYTES 3.8 (H) 0.8 - 3.5 K/UL    ABS. MONOCYTES 0.7 0 - 1.0 K/UL    ABS. EOSINOPHILS 1.0 (H) 0.0 - 0.4 K/UL    ABS.  BASOPHILS 0.0 0.0 - 0.06 K/UL    DF MANUAL      PLATELET COMMENTS ADEQUATE PLATELETS      RBC COMMENTS NORMOCYTIC, NORMOCHROMIC     METABOLIC PANEL, BASIC    Collection Time: 04/16/20  3:48 AM   Result Value Ref Range    Sodium 144 136 - 145 mmol/L    Potassium 4.4 3.5 - 5.5 mmol/L    Chloride 108 100 - 111 mmol/L    CO2 32 21 - 32 mmol/L    Anion gap 4 3.0 - 18 mmol/L    Glucose 101 (H) 74 - 99 mg/dL    BUN 17 7.0 - 18 MG/DL    Creatinine 0.88 0.6 - 1.3 MG/DL    BUN/Creatinine ratio 19 12 - 20      GFR est AA >60 >60 ml/min/1.73m2    GFR est non-AA >60 >60 ml/min/1.73m2    Calcium 9.0 8.5 - 10.1 MG/DL   MAGNESIUM    Collection Time: 04/16/20  3:48 AM   Result Value Ref Range    Magnesium 1.7 1.6 - 2.6 mg/dL   GLUCOSE, POC    Collection Time: 04/16/20  7:47 AM   Result Value Ref Range    Glucose (POC) 101 70 - 110 mg/dL   GLUCOSE, POC    Collection Time: 04/16/20 11:59 AM   Result Value Ref Range    Glucose (POC) 132 (H) 70 - 110 mg/dL   GLUCOSE, POC    Collection Time: 04/16/20  4:12 PM   Result Value Ref Range    Glucose (POC) 219 (H) 70 - 110 mg/dL     Additional Data Reviewed:      Signed By: Sindy Noguera MD     April 16, 2020 4:43 PM

## 2020-04-17 ENCOUNTER — APPOINTMENT (OUTPATIENT)
Dept: CT IMAGING | Age: 85
DRG: 192 | End: 2020-04-17
Attending: STUDENT IN AN ORGANIZED HEALTH CARE EDUCATION/TRAINING PROGRAM
Payer: MEDICARE

## 2020-04-17 ENCOUNTER — HOME HEALTH ADMISSION (OUTPATIENT)
Dept: HOME HEALTH SERVICES | Facility: HOME HEALTH | Age: 85
End: 2020-04-17
Payer: MEDICARE

## 2020-04-17 PROBLEM — J96.90 RESPIRATORY FAILURE (HCC): Status: ACTIVE | Noted: 2020-04-17

## 2020-04-17 LAB
ANION GAP SERPL CALC-SCNC: 5 MMOL/L (ref 3–18)
BASOPHILS # BLD: 0 K/UL (ref 0–0.1)
BASOPHILS NFR BLD: 0 % (ref 0–2)
BUN SERPL-MCNC: 31 MG/DL (ref 7–18)
BUN/CREAT SERPL: 30 (ref 12–20)
CALCIUM SERPL-MCNC: 9.7 MG/DL (ref 8.5–10.1)
CHLORIDE SERPL-SCNC: 105 MMOL/L (ref 100–111)
CO2 SERPL-SCNC: 28 MMOL/L (ref 21–32)
CREAT SERPL-MCNC: 1.03 MG/DL (ref 0.6–1.3)
DIFFERENTIAL METHOD BLD: ABNORMAL
EOSINOPHIL # BLD: 0 K/UL (ref 0–0.4)
EOSINOPHIL NFR BLD: 0 % (ref 0–5)
ERYTHROCYTE [DISTWIDTH] IN BLOOD BY AUTOMATED COUNT: 13.3 % (ref 11.6–14.5)
GLUCOSE BLD STRIP.AUTO-MCNC: 114 MG/DL (ref 70–110)
GLUCOSE BLD STRIP.AUTO-MCNC: 159 MG/DL (ref 70–110)
GLUCOSE BLD STRIP.AUTO-MCNC: 168 MG/DL (ref 70–110)
GLUCOSE BLD STRIP.AUTO-MCNC: 297 MG/DL (ref 70–110)
GLUCOSE SERPL-MCNC: 163 MG/DL (ref 74–99)
HCT VFR BLD AUTO: 37 % (ref 35–45)
HGB BLD-MCNC: 12.2 G/DL (ref 12–16)
LYMPHOCYTES # BLD: 1.3 K/UL (ref 0.9–3.6)
LYMPHOCYTES NFR BLD: 14 % (ref 21–52)
MCH RBC QN AUTO: 27.5 PG (ref 24–34)
MCHC RBC AUTO-ENTMCNC: 33 G/DL (ref 31–37)
MCV RBC AUTO: 83.3 FL (ref 74–97)
MONOCYTES # BLD: 0.1 K/UL (ref 0.05–1.2)
MONOCYTES NFR BLD: 1 % (ref 3–10)
NEUTS SEG # BLD: 8.2 K/UL (ref 1.8–8)
NEUTS SEG NFR BLD: 85 % (ref 40–73)
PLATELET # BLD AUTO: 200 K/UL (ref 135–420)
PMV BLD AUTO: 11.1 FL (ref 9.2–11.8)
POTASSIUM SERPL-SCNC: 4.4 MMOL/L (ref 3.5–5.5)
RBC # BLD AUTO: 4.44 M/UL (ref 4.2–5.3)
SODIUM SERPL-SCNC: 138 MMOL/L (ref 136–145)
WBC # BLD AUTO: 9.6 K/UL (ref 4.6–13.2)

## 2020-04-17 PROCEDURE — 70450 CT HEAD/BRAIN W/O DYE: CPT

## 2020-04-17 PROCEDURE — 94761 N-INVAS EAR/PLS OXIMETRY MLT: CPT

## 2020-04-17 PROCEDURE — 36415 COLL VENOUS BLD VENIPUNCTURE: CPT

## 2020-04-17 PROCEDURE — 99218 HC RM OBSERVATION: CPT

## 2020-04-17 PROCEDURE — 85025 COMPLETE CBC W/AUTO DIFF WBC: CPT

## 2020-04-17 PROCEDURE — 74011250636 HC RX REV CODE- 250/636: Performed by: INTERNAL MEDICINE

## 2020-04-17 PROCEDURE — 96376 TX/PRO/DX INJ SAME DRUG ADON: CPT

## 2020-04-17 PROCEDURE — 74011636637 HC RX REV CODE- 636/637: Performed by: INTERNAL MEDICINE

## 2020-04-17 PROCEDURE — 65270000029 HC RM PRIVATE

## 2020-04-17 PROCEDURE — 82962 GLUCOSE BLOOD TEST: CPT

## 2020-04-17 PROCEDURE — 96372 THER/PROPH/DIAG INJ SC/IM: CPT

## 2020-04-17 PROCEDURE — 74011250636 HC RX REV CODE- 250/636: Performed by: HOSPITALIST

## 2020-04-17 PROCEDURE — 74011250636 HC RX REV CODE- 250/636: Performed by: FAMILY MEDICINE

## 2020-04-17 PROCEDURE — 74011250637 HC RX REV CODE- 250/637: Performed by: HOSPITALIST

## 2020-04-17 PROCEDURE — 74011000250 HC RX REV CODE- 250: Performed by: INTERNAL MEDICINE

## 2020-04-17 PROCEDURE — 80048 BASIC METABOLIC PNL TOTAL CA: CPT

## 2020-04-17 PROCEDURE — 94640 AIRWAY INHALATION TREATMENT: CPT

## 2020-04-17 RX ORDER — AZITHROMYCIN 500 MG/1
500 TABLET, FILM COATED ORAL DAILY
Qty: 4 TAB | Refills: 0 | Status: SHIPPED | OUTPATIENT
Start: 2020-04-17 | End: 2020-04-21

## 2020-04-17 RX ORDER — PREDNISONE 10 MG/1
TABLET ORAL
Qty: 30 TAB | Refills: 0 | OUTPATIENT
Start: 2020-04-17 | End: 2020-12-20

## 2020-04-17 RX ORDER — IPRATROPIUM BROMIDE AND ALBUTEROL SULFATE 2.5; .5 MG/3ML; MG/3ML
3 SOLUTION RESPIRATORY (INHALATION)
Qty: 30 NEBULE | Refills: 2 | Status: SHIPPED | OUTPATIENT
Start: 2020-04-17

## 2020-04-17 RX ADMIN — METHYLPREDNISOLONE SODIUM SUCCINATE 40 MG: 40 INJECTION, POWDER, FOR SOLUTION INTRAMUSCULAR; INTRAVENOUS at 23:14

## 2020-04-17 RX ADMIN — Medication 10 ML: at 23:15

## 2020-04-17 RX ADMIN — HEPARIN SODIUM 5000 UNITS: 5000 INJECTION INTRAVENOUS; SUBCUTANEOUS at 13:53

## 2020-04-17 RX ADMIN — VERAPAMIL HYDROCHLORIDE 180 MG: 180 TABLET ORAL at 10:23

## 2020-04-17 RX ADMIN — INSULIN LISPRO 9 UNITS: 100 INJECTION, SOLUTION INTRAVENOUS; SUBCUTANEOUS at 13:52

## 2020-04-17 RX ADMIN — CLONIDINE HYDROCHLORIDE 0.2 MG: 0.1 TABLET ORAL at 09:18

## 2020-04-17 RX ADMIN — Medication 10 ML: at 13:53

## 2020-04-17 RX ADMIN — IPRATROPIUM BROMIDE AND ALBUTEROL SULFATE 3 ML: .5; 3 SOLUTION RESPIRATORY (INHALATION) at 11:21

## 2020-04-17 RX ADMIN — PRAVASTATIN SODIUM 40 MG: 20 TABLET ORAL at 09:17

## 2020-04-17 RX ADMIN — IPRATROPIUM BROMIDE AND ALBUTEROL SULFATE 3 ML: .5; 3 SOLUTION RESPIRATORY (INHALATION) at 15:06

## 2020-04-17 RX ADMIN — Medication 400 MG: at 09:18

## 2020-04-17 RX ADMIN — HEPARIN SODIUM 5000 UNITS: 5000 INJECTION INTRAVENOUS; SUBCUTANEOUS at 23:14

## 2020-04-17 RX ADMIN — INSULIN LISPRO 3 UNITS: 100 INJECTION, SOLUTION INTRAVENOUS; SUBCUTANEOUS at 09:16

## 2020-04-17 RX ADMIN — METHYLPREDNISOLONE SODIUM SUCCINATE 40 MG: 40 INJECTION, POWDER, FOR SOLUTION INTRAMUSCULAR; INTRAVENOUS at 13:53

## 2020-04-17 RX ADMIN — HEPARIN SODIUM 5000 UNITS: 5000 INJECTION INTRAVENOUS; SUBCUTANEOUS at 06:43

## 2020-04-17 RX ADMIN — Medication 10 ML: at 06:43

## 2020-04-17 RX ADMIN — AZITHROMYCIN MONOHYDRATE 500 MG: 500 INJECTION, POWDER, LYOPHILIZED, FOR SOLUTION INTRAVENOUS at 10:23

## 2020-04-17 RX ADMIN — FAMOTIDINE 20 MG: 20 TABLET ORAL at 09:18

## 2020-04-17 RX ADMIN — INSULIN LISPRO 2 UNITS: 100 INJECTION, SOLUTION INTRAVENOUS; SUBCUTANEOUS at 23:30

## 2020-04-17 RX ADMIN — CHOLECALCIFEROL TAB 25 MCG (1000 UNIT) 2 TABLET: 25 TAB at 09:18

## 2020-04-17 RX ADMIN — GUAIFENESIN 1200 MG: 600 TABLET, EXTENDED RELEASE ORAL at 23:14

## 2020-04-17 RX ADMIN — GUAIFENESIN 1200 MG: 600 TABLET, EXTENDED RELEASE ORAL at 09:18

## 2020-04-17 RX ADMIN — METOPROLOL TARTRATE 200 MG: 50 TABLET, FILM COATED ORAL at 09:18

## 2020-04-17 RX ADMIN — ASPIRIN 81 MG CHEWABLE TABLET 81 MG: 81 TABLET CHEWABLE at 09:18

## 2020-04-17 RX ADMIN — METHYLPREDNISOLONE SODIUM SUCCINATE 40 MG: 40 INJECTION, POWDER, FOR SOLUTION INTRAMUSCULAR; INTRAVENOUS at 06:42

## 2020-04-17 RX ADMIN — CLONIDINE HYDROCHLORIDE 0.2 MG: 0.1 TABLET ORAL at 18:00

## 2020-04-17 RX ADMIN — IPRATROPIUM BROMIDE AND ALBUTEROL SULFATE 3 ML: .5; 3 SOLUTION RESPIRATORY (INHALATION) at 07:38

## 2020-04-17 NOTE — ROUTINE PROCESS
Bedside shift change report given to Iván Maradiaga (oncoming nurse) by [facility], RN (offgoing nurse). Report included the following information SBAR, Kardex, Intake/Output, MAR, Recent Results and Quality Measures.

## 2020-04-17 NOTE — PROGRESS NOTES
Spoke with pt and spoke with pt's daughter, Mitzi Lino. Pt will be going to daughter's home at discharge and Ms. Eleno Akers will transport pt home. Address for daughter is [address] [place] [address] , [place],  [phone].  REDD Saul, [facility]- [phone]

## 2020-04-17 NOTE — PROGRESS NOTES
Problem: Falls - Risk of  Goal: *Absence of Falls  Description: Document Tre Stephen Fall Risk and appropriate interventions in the flowsheet.   Outcome: Progressing Towards Goal  Note: Fall Risk Interventions:  Mobility Interventions: Bed/chair exit alarm, Communicate number of staff needed for ambulation/transfer, Patient to call before getting OOB, PT Consult for mobility concerns, Utilize walker, cane, or other assistive device, PT Consult for assist device competence    Mentation Interventions: Adequate sleep, hydration, pain control    Medication Interventions: Bed/chair exit alarm, Patient to call before getting OOB, Teach patient to arise slowly    Elimination Interventions: Call light in reach    History of Falls Interventions: Bed/chair exit alarm, Room close to nurse's station, Assess for delayed presentation/identification of injury for 48 hrs (comment for end date), Vital signs minimum Q4HRs X 24 hrs (comment for end date)

## 2020-04-17 NOTE — DIABETES MGMT
GLYCEMIC CONTROL PLAN OF CARE     Assessment/Recommendations:  Fasting lab glucose this am 163 mg/dl  Noted pt receiving steroids  Noted pt started on lispro corrective insulin coverage as needed  Receiving very insulin resistant dosing. Will continue inpatient monitoring. Noted pt for possible discharge today  Most recent blood glucose values:  Results for Duglas Cervantes (MRN [id number]) as of 4/17/2020 12:12   Ref. Range 4/16/2020 11:59 4/16/2020 16:12 4/16/2020 20:57 4/17/2020 07:53 4/17/2020 11:47   GLUCOSE,FAST - POC Latest Ref Range: 70 - 110 mg/dL 132 (H) 219 (H) 204 (H) 159 (H) 297 (H)     Current A1C of 6.3 % is equivalent to average blood glucose of 134 mg/dl over the past 2-3 months. Current hospital diabetes medications:   Lispro corrective insulin coverage AC&HS  Previous day's insulin requirements:   lispro 8 units corrective insulin coverage   Home diabetes medications:  Verified with pts list from home. Amaryl 4 mg daily  Diet:    Diabetic consistent carb with supplements  Education:  ____Refer to Diabetes Education Record             _x__Education not indicated at this time  A1C 6.3%. Appropriate for age. No issues of hyper or hypoglycemia at home per pt.     Carmelina Garcia

## 2020-04-17 NOTE — DISCHARGE SUMMARY
Discharge Summary     Patient ID:  Maria Eugenia Batista  [id number]  80 y.o.  1934  Body mass index is 27.46 kg/m². PCP on record: Shirley Wheeler MD    Admit date: 4/12/2020  Discharge date and time: 4/18/2020    Discharge Diagnoses:                                           1. Acute hypoxic respiratory failure - improved   2. COPD exacerbation - continue current management   3. Hypoxia   4. Hypomagnesemia   5. Type II DM   6. HTN                  7 HLD       8 Leucocytosis - Steroid induced       Consults: None          Hospital Course by problems:    Patient who was admitted through emergency room with history of cough shortness of breath, she also developed increasing shortness of breath with increased history of recurrent bronchitis likely COPD. She was admitted she was tested for COVID which was negative. Patient improved on steroid and inhalers now being discharged home on a Zithromax and nebulizer treatments with tapering steroids. Her room air oxygen saturation was quite adequate and she was ambulating on the floor at least making to 3 rounds without hypoxia or symptoms now being discharged home in a stable condition with outpatient follow-up    ADD:    Last night after discharge summary and after discharge planning patient was found on the floor next to her bed. Good response team was called in-guarded evaluation patient complained about headache, no external head injury no other injuries patient was otherwise back to her normal weight,    CT head without contrast was done which was unremarkable    Leucocytosis : needs out patient monitoring - likely steroid induced , no clinical evidence of infection . Patient seen and examined by me on discharge day.   Pertinent Findings:  Patient is Alert Awake and oriented   HEENT - NAD    RS - Clear , no rales no rhonchi   CVS - regular rhythm and rate acceptable    abd - benign, BS present , no Distension   EXT - no edema , no calf tenderness   Neuro - alert and awake , grossly motor and sensory intact       Significant Diagnostic Studies:  Results   CTA CHEST W OR W WO CONT (Accession [id number]) (Order [id number])   Allergies      No Known Allergies   Exam Information     Status Exam Begun  Exam Ended    Final [99] 4/12/2020 20:31 4/12/2020 20:33   Result Information     Status: Final result (Exam End: 4/12/2020 20:33) Provider Status: Open   Study Result     EXAM: CTA CHEST W OR W WO CONT     CLINICAL INDICATION/HISTORY : COPD exacerbation, eval for COVID pneumonia  pattern, PE.     COMPARISON: July 2, 2019     TECHNIQUE: Thin section axial images were obtained from the thoracic inlet  through the upper abdomen after the uneventful administration of IV contrast.   Utilization of smart prep dynamic bolus enhancement, timing centered for  pulmonary artery delineation. Coronal and sagittal maximum intensity projection  (MIP) reconstructions were post-processed and utilized to better define  pulmonary artery anatomy and increase sensitivity for detecting emboli. 100 cc   Omni 350 IV     All CT scans at this facility are performed using dose optimization of technique  as appropriate to a performed exam, to include automated exposure control,  adjustment of the mA and/or kV according to patient size (including appropriate  matching for site specific examinations), or use of iterative reconstruction  technique.     FINDINGS:     Lungs: Minimal stable streaky opacity in the right upper lobe likely mild  scarring. Mild diffuse bronchial wall thickening     Thyroid and chest wall: Unremarkable     Heart: There are atherosclerotic vascular calcifications. No cardiomegaly     Aorta: Atherosclerotic vascular calcification     Pleural spaces: No effusions     Pulmonary Arteries: Diagnostic evaluation of the pulmonary arteries.   No  intraluminal filling defect to suggest acute pulmonary embolism.     Adenopathy: Calcified mediastinal and hilar lymph nodes suggesting old  granulomatous disease        Upper abdomen: Stable left adrenal nodule. Scarring upper pole left kidney     Bones: Unremarkable for age     IMPRESSION  IMPRESSION:     1.  Exam is limited by patient motion. No convincing evidence for pulmonary  embolism. 2.  Mild diffuse bronchial wall thickening suggestive of acute or chronic  bronchitis. 3.  Old granulomatous disease. 4.  Stable left adrenal nodule. 5.  No findings suggestive of viral pneumonia       EXAMINATION: Chest single view     INDICATION: Shortness of breath, cough     COMPARISON: 2/20/2020     FINDINGS: Single frontal view the chest obtained. Underpenetration slightly  limits evaluation. Mediastinal silhouette and pulmonary vasculature  unremarkable. Aortic arch calcifications. No confluent consolidation. Right lung  base streaky densities. No evidence of pneumothorax. No obvious acute osseous  findings.     IMPRESSION  IMPRESSION:     No clearly acute findings. Right lung base coarse streaky densities, likely  atelectasis versus scar. Pertinent Lab Data:  Recent Labs     04/18/20  0205 04/17/20  0515 04/16/20  0348   WBC 16.9* 9.6 9.5   HGB 11.0* 12.2 12.0   HCT 33.2* 37.0 37.3    200 189     Recent Labs     04/18/20  0205 04/17/20  0515 04/16/20  0348    138 144   K 4.2 4.4 4.4    105 108   CO2 28 28 32   * 163* 101*   BUN 30* 31* 17   CREA 0.92 1.03 0.88   CA 9.4 9.7 9.0   MG  --   --  1.7       DISCHARGE MEDICATIONS:   @  Current Discharge Medication List      START taking these medications    Details   fluticasone propionate (FLONASE) 50 mcg/actuation nasal spray 2 spar each nostril Bid  Qty: 1 Bottle, Refills: 0      albuterol-ipratropium (DUO-NEB) 2.5 mg-0.5 mg/3 ml nebu 3 mL by Nebulization route every four (4) hours as needed for Wheezing.  Indications: bronchi muscle spasm resulting from COPD  Qty: 30 Nebule, Refills: 2      azithromycin (Zithromax) 500 mg tab Take 1 Tab by mouth daily for 4 days. Qty: 4 Tab, Refills: 0    Associated Diagnoses: COPD exacerbation ([address]      predniSONE (DELTASONE) 10 mg tablet 1 tab po qid  for 3 days then 1 tab po tid for 3 days then 1 tab po bid for 3 days then 1 tab po daily 3 days  Qty: 30 Tab, Refills: 0         CONTINUE these medications which have NOT CHANGED    Details   sodium chloride (SALINE MIST) 0.65 % nasal squeeze bottle 0.1 mL by Both Nostrils route as needed for Congestion. Indications: stuffy nose  Qty: 15 mL, Refills: 0      albuterol (PROVENTIL HFA, VENTOLIN HFA, PROAIR HFA) 90 mcg/actuation inhaler Take 2 Puffs by inhalation every four (4) hours as needed for Wheezing. Qty: 1 Inhaler, Refills: 0      guaiFENesin (MUCUS RELIEF ER) 1,200 mg Ta12 ER tablet Take 1,200 mg by mouth two (2) times a day. famotidine (PEPCID) 20 mg tablet Take 1 Tab by mouth daily. Qty: 60 Tab, Refills: 0      aspirin 81 mg chewable tablet Take 81 mg by mouth daily. CETIRIZINE HCL (CETIRIZINE PO) Take 10 mg by mouth nightly. NEBULIZER by Does Not Apply route. Cholecalciferol, Vitamin D3, (VITAMIN D3) 1,000 unit cap Take 1,000 Units by mouth daily. cloNIDine (CATAPRESS) 0.2 mg tablet Take 0.2 mg by mouth two (2) times a day. glimepiride (AMARYL) 4 mg tablet Take 2 mg by mouth daily. lisinopril (PRINIVIL, ZESTRIL) 20 mg tablet Take 40 mg by mouth daily. metoprolol (LOPRESSOR) 100 mg tablet Take 200 mg by mouth daily. pravastatin (PRAVACHOL) 40 mg tablet Take 40 mg by mouth daily. verapamil SR (CALAN-SR) 180 mg CR tablet Take 180 mg by mouth daily. My Recommended Diet, Activity, Wound Care, and follow-up labs are listed in the patient's Discharge Insturctions which I have personally completed and reviewed.       Disposition:     [x] Home with family     [] [facility] PT/RN   [] SNF/NH   [] Inpatient Rehab/YOSELIN  Condition at Discharge:  Stable    Follow up with:   PCP : Kevin Stringer, MD      Please follow-up tests/labs that are still pendin.  None  2.    >30 minutes spent coordinating this discharge (review instructions/follow-up, prescriptions, preparing report for sign off)    Signed:  Willian Brunner, MD  2020  11:51 AM

## 2020-04-17 NOTE — PROGRESS NOTES
[facility] Hospitalist Group  Progress Note    Patient: Maria Eugenia Batista Age: 80 y.o. : 1934 MR#: [id number] SSN: [id number]  Date/Time: 2020     Subjective:     Review of systems    No CP   NO NVD  No SOB  NO Cough     Assessment/Plan:     1. Acute hypoxic respiratory failure - improved   2. COPD exacerbation - continue current management   3. Hypoxia   4. Hypomagnesemia   5. Type II DM   6. HTN    7 HLD       PLAN :    - stable for dc to day   - D/W patient regarding placement - she wishes to go to her daughter's house   - D/W Case management     Case discussed with:  [x]Patient  [] Family ( In room with patient )    []Nursing  []Case Management  DVT Prophylaxis:  []Lovenox  []Hep SQ  []SCDs  []Coumadin   []On Heparin gtt          Objective:   VS:   Visit Vitals  /71 (BP 1 Location: Left arm, BP Patient Position: At rest)   Pulse (!) 106   Temp 98.4 °F (36.9 °C)   Resp 19   Ht 5' 4\" (1.626 m)   Wt 72.6 kg (160 lb)   SpO2 96%   Breastfeeding No   BMI 27.46 kg/m²      Tmax/24hrs: Temp (24hrs), Av °F (36.7 °C), Min:97.2 °F (36.2 °C), Max:98.5 °F (36.9 °C)  IOBRIEF    Intake/Output Summary (Last 24 hours) at 2020 1004  Last data filed at 2020 3894  Gross per 24 hour   Intake 240 ml   Output 600 ml   Net -360 ml       General:  Alert, cooperative, no acute distress   Cardiovascular: S1S2 - regular , No Murmur   Pulmonary: Equal expansion , No Use of accessory muscles , No Rales No Rhonchi    GI:  +BS in all four quadrants, soft, non-tender  Extremities:  No edema; 2+ dorsalis pedis pulses bilaterally  Neuro: Alert and oriented X 2.        Medications:   Current Facility-Administered Medications   Medication Dose Route Frequency    albuterol-ipratropium (DUO-NEB) 2.5 MG-0.5 MG/3 ML  3 mL Nebulization Q4H RT    methylPREDNISolone (PF) (SOLU-MEDROL) injection 40 mg  40 mg IntraVENous Q8H    azithromycin (ZITHROMAX) 500 mg in  mL  500 mg IntraVENous Q24H    guaiFENesin-codeine (ROBITUSSIN AC) 100-10 mg/5 mL solution 10 mL  10 mL Oral Q4H PRN    sodium chloride (NS) flush 5-40 mL  5-40 mL IntraVENous Q8H    sodium chloride (NS) flush 5-40 mL  5-40 mL IntraVENous PRN    naloxone (NARCAN) injection 0.4 mg  0.4 mg IntraVENous PRN    diphenhydrAMINE (BENADRYL) injection 25 mg  25 mg IntraVENous Q4H PRN    ondansetron (ZOFRAN) injection 4 mg  4 mg IntraVENous Q4H PRN    bisacodyL (DULCOLAX) tablet 10 mg  10 mg Oral DAILY PRN    heparin (porcine) injection 5,000 Units  5,000 Units SubCUTAneous Q8H    insulin lispro (HUMALOG) injection   SubCUTAneous AC&HS    glucose chewable tablet 16 g  4 Tab Oral PRN    glucagon (GLUCAGEN) injection 1 mg  1 mg IntraMUSCular PRN    dextrose (D50W) injection syrg 12.5-25 g  25-50 mL IntraVENous PRN    albuterol (PROVENTIL HFA, VENTOLIN HFA, PROAIR HFA) inhaler 2 Puff  2 Puff Inhalation Q4H PRN    aspirin chewable tablet 81 mg  81 mg Oral DAILY    cloNIDine HCL (CATAPRES) tablet 0.2 mg  0.2 mg Oral BID    famotidine (PEPCID) tablet 20 mg  20 mg Oral DAILY    guaiFENesin ER (MUCINEX) tablet 1,200 mg  1,200 mg Oral BID    metoprolol tartrate (LOPRESSOR) tablet 200 mg  200 mg Oral DAILY    pravastatin (PRAVACHOL) tablet 40 mg  40 mg Oral DAILY    verapamil ER (CALAN-SR) tablet 180 mg  180 mg Oral DAILY    acetaminophen (TYLENOL) tablet 650 mg  650 mg Oral Q4H PRN    cholecalciferol (VITAMIN D3) (1000 Units /25 mcg) tablet 2 Tab  2,000 Units Oral DAILY       Labs:    Recent Labs     04/17/20  0515 04/16/20  0348   WBC 9.6 9.5   HGB 12.2 12.0   HCT 37.0 37.3    189     Recent Labs     04/17/20  0515 04/16/20  0348    144   K 4.4 4.4    108   CO2 28 32   * 101*   BUN 31* 17   CREA 1.03 0.88   CA 9.7 9.0   MG  --  1.7         Signed By: Emil Huang MD     April 17, 2020

## 2020-04-17 NOTE — PROGRESS NOTES
Post Fall Documentation    [address] [place] unwitnessed fall occurred on 4/17/2020 (Date) at [phone] (Time). The answers to the following questions summarize the fall: In the patient's own words:  · What were you attempting to do when you fell? \"To clean up and pack myself up. \"  · Do you know why you fell? \"Because I am hard-headed and was doing what I wasn't supposed to be doing! \"  · Do you have any pain/discomfort or any other complaints? \"No, but I hit my head. \"  · Which part of your body made contact with the floor or other object? \"My behind and my head. \"  Nurse:  [facility] Was this an assisted fall? no   Was fall witnessed? No   If witnessed, what part of the body made contact with the floor or other object? NA   Patients mental status after the fall/when found: Confused   Any apparent injury:  No apparent injury   Immediate interventions for injury/suspected injury? No interventions needed   Patient assisted back to bed? Assist X2   Name of provider notified and time, any comments? PFMISA and Dr. Ronen Pérez Name of family member notified and time: Tariq Quevedo (Daughter [phone]) 1940    Document Immediate VS and physical assessment in flowsheets. Document Neuro assessment every hour x 4 (for potential head injury or unwitnessed fall) in flowsheets.         Mary Saucedo

## 2020-04-17 NOTE — PROGRESS NOTES
Discharge order noted for today. FOC obtained for [facility]. Pt has been accepted to [facility] agency. Met with patient and spoke with pt's daughter, Ernesto Glez and are agreeable to the transition plan today. Transport has been arranged through daughter  Mat Foots. Patient's discharge summary and home health  orders have been forwarded to [facility] home health  agency via care connect. Updated bedside RN,Asa ,  to the transition plan.   Discharge information has been documented on the AVS.       REDD Rodriguez, [facility]- [phone]

## 2020-04-17 NOTE — PROGRESS NOTES
Patient ambulated ~1200 feet (4 laps around unit) with supervision and no equipment. Pain Level Before FMP: 0/10  Pain Level After FMP: Same    [x]  Alerted nurse. [x]  Call bell and phone within patient reach. [x]   Patient resting in bed with no apparent distress . NOTE: Pt alert and agreed to participate in therapy. She was talkative throughout the session and denied SOB.      Thank you,  Hilario Hill, Rehab Technician

## 2020-04-17 NOTE — PROGRESS NOTES
Bedside and Verbal shift change report given to LAURA Nuñez (oncoming nurse) by Lawanda Berry (offgoing nurse). Report included the following information SBAR, Kardex, Intake/Output, MAR and Recent Results.

## 2020-04-17 NOTE — PROGRESS NOTES
FALL ASSESSMENT NOTE  [facility] EVMS Service    Patient: Morales Solo MRN: [id number]   SSN: [id number]  YOB: 1934   Age: 80 y.o. Sex: female    Admit date: 4/12/2020 Length of stay:  LOS: 1 day    PCP: Timmy Cummins MD PP: COPD exacerbation ([facility]     Subjective:   Called to bedside by nursing staff for fall evaluation. Objective:   Vital Signs:  Visit Vitals  /76 (BP 1 Location: Left arm, BP Patient Position: At rest)   Pulse 93   Temp 97.9 °F (36.6 °C)   Resp 19   Ht 5' 4\" (1.626 m)   Wt 72.6 kg (160 lb)   SpO2 96%   Breastfeeding No   BMI 27.46 kg/m²       Physical Exam:  General appearance: nad  HEENT: normocephalic, atraumatic, no lacerations, contusions, no tenderness to posterior occiput, no tenderness to cervical spine, neck ROM wnl  Lungs: ctab s rrw  Heart: rrr s mrg  Skin: No gross abnormalities  MSK: 5/5 strength in upper/lower ext bilaterally  Neuro: cranial nerves 2-12 intact  Psych: AAOx1 to person, says she is in a hopsital but cannot remember which one. Assessment and Plan:   80 y.o. yo female admitted for COPD exacerbation ([facility] s/p fall in house. Fall was unwitnessed. Per nursing, patient got out of bed, filled up basin full of water, and patient was found sitting up on the ground. Patient states she hit her head on the ground and \"saw lightening\". She is a poor historian and her speech is tangential. Denies any pain currently. Exam is reassuring. No deformities and neurologically intact. Patient doing well. Fall precautions in place. Counseled on how to avoid falling in the future. Glucose and VSS. Patient stable at this time. Given concern for unwitnessed fall and head trauma will check CT head w/o contrast. Discussed with Dr. Karrie Sanders, will cancel discharge this evening. Primary team resuming care.      Signed,  Sneha Chong MD PGY-2    April 17, 2020    4:38 PM

## 2020-04-17 NOTE — PROGRESS NOTES
conducted an initial consultation and Spiritual Assessment for [facility], who is a 80 y.o.,female. Patients Primary Language is: [place]. According to the patients EMR Adventism Affiliation is: [facility].     The reason the Patient came to the hospital is:   Patient Active Problem List    Diagnosis Date Noted    Respiratory failure ([facility] 04/17/2020    Suspected Covid-19 Virus Infection 04/13/2020    COPD exacerbation ([facility] 04/12/2020    Hypoxia 06/30/2019    Controlled type 2 diabetes mellitus, without long-term current use of insulin ([facility] 06/30/2019    Essential hypertension 06/30/2019        The  provided the following Interventions:  Initiated a relationship of care and support. Explored issues of heladio, belief, spirituality and Protestant/ritual needs while hospitalized. Listened empathically. Provided chaplaincy education. Provided information about Spiritual Care Services. Offered prayer and assurance of continued prayers on patient's behalf. Chart reviewed. The following outcomes where achieved:  Patient shared limited information about both their medical narrative and spiritual journey/beliefs. Patient processed feeling about current hospitalization. Patient expressed gratitude for 's visit. Assessment:  Patient does not have any Protestant/cultural needs that will affect patients preferences in health care. There are no spiritual or Protestant issues which require intervention at this time. Plan:  Chaplains will continue to follow and will provide pastoral care on an as needed/requested basis.  recommends bedside caregivers page  on duty if patient shows signs of acute spiritual or emotional distress.     [id number] [facility]   [phone]

## 2020-04-17 NOTE — PROGRESS NOTES
Problem: Falls - Risk of  Goal: *Absence of Falls  Description: Document Ginna Hinkle Fall Risk and appropriate interventions in the flowsheet.   Outcome: Progressing Towards Goal  Note: Fall Risk Interventions:  Mobility Interventions: Utilize walker, cane, or other assistive device    Mentation Interventions: Adequate sleep, hydration, pain control    Medication Interventions: Patient to call before getting OOB    Elimination Interventions: Call light in reach    History of Falls Interventions: Bed/chair exit alarm, Room close to nurse's station, Assess for delayed presentation/identification of injury for 48 hrs (comment for end date), Vital signs minimum Q4HRs X 24 hrs (comment for end date)

## 2020-04-17 NOTE — HOME CARE
Discharge noted for today. Received home health referral for [facility] for SN, PT, and OT. Spoke with patient's daughter Chinmay Davey via phone, explained services and answered all questions. Demographics verified. Order processed and emailed to central office. Patient has the following DME: none.  Refwilbert Godinez, [facility] Liaison

## 2020-04-17 NOTE — PHYSICIAN ADVISORY
Letter of Admission Status Determination: Upgrade to Inpatient Dominic Torres was originally hospitalized as Outpatient Observation status on 4/12/2020. Ongoing hospitalization was warranted for this patient with persistent dyspnea, wheezing, hypoxemia despite observation care treatment. Based on the documented clinical condition and care plan, we recommend upgrading patient's hospitalization status to INPATIENT.  
  
The final decision regarding the patient's hospitalization status depends on the attending physician's judgment. Creston Sicard, MD, VAN, FACP, [facility], CHCQM-PHYADV Physician Advisor [facility] [phone]

## 2020-04-17 NOTE — DISCHARGE INSTRUCTIONS
Your A1C  was   Lab Results   Component Value Date/Time    Hemoglobin A1c 6.3 (H) 04/13/2020 11:05 AM   .      This lab test reflects that your blood sugar was elevated  over the past 3 months and should be evaluated by your primary care provider. This lab test reflects that your blood sugar averaged   over the past 3 months. It is important to follow up with your provider on a routine basis to continue to evaluate your blood sugar discuss any necessary changes in treatment. A1C of 6.3% is equivalent to an average blood glucose of 134 mg/dl over the past 3 months. Patient Education        Chronic Obstructive Pulmonary Disease (COPD): Care Instructions  Your Care Instructions    Chronic obstructive pulmonary disease (COPD) is a general term for a group of lung diseases, including emphysema and chronic bronchitis. People with COPD have decreased airflow in and out of the lungs, which makes it hard to breathe. The airways also can get clogged with thick mucus. Cigarette smoking is a major cause of COPD. Although there is no cure for COPD, you can slow its progress. Following your treatment plan and taking care of yourself can help you feel better and live longer. Follow-up care is a key part of your treatment and safety. Be sure to make and go to all appointments, and call your doctor if you are having problems. It's also a good idea to know your test results and keep a list of the medicines you take. How can you care for yourself at home?   Staying healthy    · Do not smoke. This is the most important step you can take to prevent more damage to your lungs. If you need help quitting, talk to your doctor about stop-smoking programs and medicines. These can increase your chances of quitting for good.     · Avoid colds and flu. Get a pneumococcal vaccine shot. If you have had one before, ask your doctor whether you need a second dose. Get the flu vaccine every fall.  If you must be around people with colds or the flu, wash your hands often.     · Avoid secondhand smoke, air pollution, and high altitudes. Also avoid cold, dry air and hot, humid air. Stay at home with your windows closed when air pollution is bad.    Medicines and oxygen therapy    · Take your medicines exactly as prescribed. Call your doctor if you think you are having a problem with your medicine.     · You may be taking medicines such as:  ? Bronchodilators. These help open your airways and make breathing easier. Bronchodilators are either short-acting (work for 6 to 9 hours) or long-acting (work for 24 hours). You inhale most bronchodilators, so they start to act quickly. Always carry your quick-relief inhaler with you in case you need it while you are away from home.  ? Corticosteroids (prednisone, budesonide). These reduce airway inflammation. They come in pill or inhaled form. You must take these medicines every day for them to work well.     · A spacer may help you get more inhaled medicine to your lungs. Ask your doctor or pharmacist if a spacer is right for you. If it is, ask how to use it properly.     · Do not take any vitamins, over-the-counter medicine, or herbal products without talking to your doctor first.     · If your doctor prescribed antibiotics, take them as directed. Do not stop taking them just because you feel better. You need to take the full course of antibiotics.     · Oxygen therapy boosts the amount of oxygen in your blood and helps you breathe easier. Use the flow rate your doctor has recommended, and do not change it without talking to your doctor first.   Activity    · Get regular exercise. Walking is an easy way to get exercise. Start out slowly, and walk a little more each day.     · Pay attention to your breathing. You are exercising too hard if you cannot talk while you are exercising.     · Take short rest breaks when doing household chores and other activities.     · Learn breathing methods--such as breathing  through pursed lips--to help you become less short of breath.     · If your doctor has not set you up with a pulmonary rehabilitation program, talk to him or her about whether rehab is right for you. Rehab includes exercise programs, education about your disease and how to manage it, help with diet and other changes, and emotional support. Diet    · Eat regular, healthy meals. Use bronchodilators about 1 hour before you eat to make it easier to eat. Eat several small meals instead of three large ones. Drink beverages at the end of the meal. Avoid foods that are hard to chew.     · Eat foods that contain protein so that you do not lose muscle mass.     · Talk with your doctor if you gain too much weight or if you lose weight without trying.    Mental health    · Talk to your family, friends, or a therapist about your feelings. It is normal to feel frightened, angry, hopeless, helpless, and even guilty. Talking openly about bad feelings can help you cope. If these feelings last, talk to your doctor. When should you call for help? Call 911 anytime you think you may need emergency care. For example, call if:    · You have severe trouble breathing.    Call your doctor now or seek immediate medical care if:    · You have new or worse trouble breathing.     · You cough up blood.     · You have a fever.    Watch closely for changes in your health, and be sure to contact your doctor if:    · You cough more deeply or more often, especially if you notice more mucus or a change in the color of your mucus.     · You have new or worse swelling in your legs or belly.     · You are not getting better as expected. Where can you learn more? Go to http://www.gray.com/  Enter L888 in the search box to learn more about \"Chronic Obstructive Pulmonary Disease (COPD): Care Instructions. \"  Current as of: June 9, 2019Content Version: 12.4  © 3186-3078 Healthwise, Incorporated.   Care instructions adapted under license by [address] (which disclaims liability or warranty for this information). If you have questions about a medical condition or this instruction, always ask your healthcare professional. [address] any warranty or liability for your use of this information. Patient Education        COPD Exacerbation Plan: Care Instructions  Your Care Instructions    If you have chronic obstructive pulmonary disease (COPD), your usual shortness of breath could suddenly get worse. You may start coughing more and have more mucus. This flare-up is called a COPD exacerbation (say \"ng-DCJ-nk-BAY-swati\"). A lung infection or air pollution could set off an exacerbation. Sometimes it can happen after a quick change in temperature or being around chemicals. Work with your doctor to make a plan for dealing with an exacerbation. You can better manage it if you plan ahead. Follow-up care is a key part of your treatment and safety. Be sure to make and go to all appointments, and call your doctor if you are having problems. It's also a good idea to know your test results and keep a list of the medicines you take. How can you care for yourself at home? During an exacerbation  · Do not panic if you start to have one. Quick treatment at home may help you prevent serious breathing problems. If you have a COPD exacerbation plan that you developed with your doctor, follow it. · Take your medicines exactly as your doctor tells you.  ? Use your inhaler as directed by your doctor. If your symptoms do not get better after you use your medicine, have someone take you to the emergency room. Call an ambulance if necessary. ? With inhaled medicines, a spacer or a nebulizer may help you get more medicine to your lungs. Ask your doctor or pharmacist how to use them properly. Practice using the spacer in front of a mirror before you have an exacerbation.  This may help you get the medicine into your lungs quickly. ? If your doctor has given you steroid pills, take them as directed. ? Your doctor may have given you a prescription for antibiotics, which you can fill if you need it. ? Talk to your doctor if you have any problems with your medicine. And call your doctor if you have to use your antibiotic or steroid pills. Preventing an exacerbation  · Do not smoke. This is the most important step you can take to prevent more damage to your lungs and prevent problems. If you already smoke, it is never too late to stop. If you need help quitting, talk to your doctor about stop-smoking programs and medicines. These can increase your chances of quitting for good. · Take your daily medicines as prescribed. · Avoid colds and flu. ? Get a pneumococcal vaccine. ? Get a flu vaccine each year, as soon as it is available. Ask those you live or work with to do the same, so they will not get the flu and infect you. ? Try to stay away from people with colds or the flu. ? Wash your hands often. · Avoid secondhand smoke; air pollution; cold, dry air; hot, humid air; and high altitudes. Stay at home with your windows closed when air pollution is bad. · Learn breathing techniques for COPD, such as breathing through pursed lips. These techniques can help you breathe easier during an exacerbation. When should you call for help? Call 911 anytime you think you may need emergency care.  For example, call if:    · You have severe trouble breathing.     · You have severe chest pain.    Call your doctor now or seek immediate medical care if:    · You have new or worse shortness of breath.     · You develop new chest pain.     · You are coughing more deeply or more often, especially if you notice more mucus or a change in the color of your mucus.     · You cough up blood.     · You have new or increased swelling in your legs or belly.     · You have a fever.    Watch closely for changes in your health, and be sure to contact your doctor if:    · You need to use your antibiotic or steroid pills.     · Your symptoms are getting worse. Where can you learn more? Go to http://www.gray.com/  Enter U536 in the search box to learn more about \"COPD Exacerbation Plan: Care Instructions. \"  Current as of: June 9, 2019Content Version: 12.4  © 7956-7502 [employer]. Care instructions adapted under license by [employer] (which disclaims liability or warranty for this information). If you have questions about a medical condition or this instruction, always ask your healthcare professional. [place] [address] any warranty or liability for your use of this information. Patient armband removed and shredded        DISCHARGE SUMMARY from Nurse    PATIENT INSTRUCTIONS:    After general anesthesia or intravenous sedation, for 24 hours or while taking prescription Narcotics:  · Limit your activities  · Do not drive and operate hazardous machinery  · Do not make important personal or business decisions  · Do  not drink alcoholic beverages  · If you have not urinated within 8 hours after discharge, please contact your surgeon on call. Report the following to your surgeon:  · Excessive pain, swelling, redness or odor of or around the surgical area  · Temperature over 100.5  · Nausea and vomiting lasting longer than 4 hours or if unable to take medications  · Any signs of decreased circulation or nerve impairment to extremity: change in color, persistent  numbness, tingling, coldness or increase pain  · Any questions    What to do at Home:  Recommended activity: Activity as tolerated    If you experience any of the following symptoms Nausea, Vomiting, Diarrhea, Fever greater than 100.5, Dizziness, Shortness of breath, Chest pain, Increased pain, please follow up with PCP. *  Please give a list of your current medications to your Primary Care Provider.     *  Please update this list whenever your medications are discontinued, doses are      changed, or new medications (including over-the-counter products) are added. *  Please carry medication information at all times in case of emergency situations. These are general instructions for a healthy lifestyle:    No smoking/ No tobacco products/ Avoid exposure to second hand smoke  Surgeon General's Warning:  Quitting smoking now greatly reduces serious risk to your health. Obesity, smoking, and sedentary lifestyle greatly increases your risk for illness    A healthy diet, regular physical exercise & weight monitoring are important for maintaining a healthy lifestyle    You may be retaining fluid if you have a history of heart failure or if you experience any of the following symptoms:  Weight gain of 3 pounds or more overnight or 5 pounds in a week, increased swelling in our hands or feet or shortness of breath while lying flat in bed. Please call your doctor as soon as you notice any of these symptoms; do not wait until your next office visit. The discharge information has been reviewed with the patient. The patient verbalized understanding. Discharge medications reviewed with the patient and appropriate educational materials and side effects teaching were provided.   ___________________________________________________________________________________________________________________________________

## 2020-04-18 VITALS
SYSTOLIC BLOOD PRESSURE: 170 MMHG | RESPIRATION RATE: 16 BRPM | HEART RATE: 100 BPM | TEMPERATURE: 97.9 F | DIASTOLIC BLOOD PRESSURE: 87 MMHG | WEIGHT: 160 LBS | OXYGEN SATURATION: 97 % | BODY MASS INDEX: 27.31 KG/M2 | HEIGHT: 64 IN

## 2020-04-18 LAB
ANION GAP SERPL CALC-SCNC: 6 MMOL/L (ref 3–18)
BACTERIA SPEC CULT: ABNORMAL
BACTERIA SPEC CULT: ABNORMAL
BACTERIA SPEC CULT: NORMAL
BASOPHILS # BLD: 0 K/UL (ref 0–0.06)
BASOPHILS NFR BLD: 0 % (ref 0–3)
BUN SERPL-MCNC: 30 MG/DL (ref 7–18)
BUN/CREAT SERPL: 33 (ref 12–20)
CALCIUM SERPL-MCNC: 9.4 MG/DL (ref 8.5–10.1)
CHLORIDE SERPL-SCNC: 107 MMOL/L (ref 100–111)
CO2 SERPL-SCNC: 28 MMOL/L (ref 21–32)
CREAT SERPL-MCNC: 0.92 MG/DL (ref 0.6–1.3)
DIFFERENTIAL METHOD BLD: ABNORMAL
EOSINOPHIL # BLD: 0 K/UL (ref 0–0.4)
EOSINOPHIL NFR BLD: 0 % (ref 0–5)
ERYTHROCYTE [DISTWIDTH] IN BLOOD BY AUTOMATED COUNT: 13.6 % (ref 11.6–14.5)
GLUCOSE BLD STRIP.AUTO-MCNC: 152 MG/DL (ref 70–110)
GLUCOSE SERPL-MCNC: 153 MG/DL (ref 74–99)
GRAM STN SPEC: ABNORMAL
GRAM STN SPEC: ABNORMAL
HCT VFR BLD AUTO: 33.2 % (ref 35–45)
HGB BLD-MCNC: 11 G/DL (ref 12–16)
LYMPHOCYTES # BLD: 1.7 K/UL (ref 0.8–3.5)
LYMPHOCYTES NFR BLD: 10 % (ref 20–51)
MCH RBC QN AUTO: 27.2 PG (ref 24–34)
MCHC RBC AUTO-ENTMCNC: 33.1 G/DL (ref 31–37)
MCV RBC AUTO: 82 FL (ref 74–97)
MONOCYTES # BLD: 0 K/UL (ref 0–1)
MONOCYTES NFR BLD: 0 % (ref 2–9)
NEUTS SEG # BLD: 15.2 K/UL (ref 1.8–8)
NEUTS SEG NFR BLD: 90 % (ref 42–75)
PLATELET # BLD AUTO: 210 K/UL (ref 135–420)
PLATELET COMMENTS,PCOM: ABNORMAL
PMV BLD AUTO: 11.1 FL (ref 9.2–11.8)
POTASSIUM SERPL-SCNC: 4.2 MMOL/L (ref 3.5–5.5)
RBC # BLD AUTO: 4.05 M/UL (ref 4.2–5.3)
RBC MORPH BLD: ABNORMAL
SERVICE CMNT-IMP: ABNORMAL
SERVICE CMNT-IMP: NORMAL
SODIUM SERPL-SCNC: 141 MMOL/L (ref 136–145)
WBC # BLD AUTO: 16.9 K/UL (ref 4.6–13.2)

## 2020-04-18 PROCEDURE — 74011250636 HC RX REV CODE- 250/636: Performed by: INTERNAL MEDICINE

## 2020-04-18 PROCEDURE — 74011636637 HC RX REV CODE- 636/637: Performed by: INTERNAL MEDICINE

## 2020-04-18 PROCEDURE — 80048 BASIC METABOLIC PNL TOTAL CA: CPT

## 2020-04-18 PROCEDURE — 94640 AIRWAY INHALATION TREATMENT: CPT

## 2020-04-18 PROCEDURE — 82962 GLUCOSE BLOOD TEST: CPT

## 2020-04-18 PROCEDURE — 74011250637 HC RX REV CODE- 250/637: Performed by: HOSPITALIST

## 2020-04-18 PROCEDURE — 85025 COMPLETE CBC W/AUTO DIFF WBC: CPT

## 2020-04-18 PROCEDURE — 74011250636 HC RX REV CODE- 250/636: Performed by: HOSPITALIST

## 2020-04-18 PROCEDURE — 74011000250 HC RX REV CODE- 250: Performed by: INTERNAL MEDICINE

## 2020-04-18 PROCEDURE — 74011250636 HC RX REV CODE- 250/636: Performed by: FAMILY MEDICINE

## 2020-04-18 PROCEDURE — 74011250637 HC RX REV CODE- 250/637: Performed by: INTERNAL MEDICINE

## 2020-04-18 PROCEDURE — 36415 COLL VENOUS BLD VENIPUNCTURE: CPT

## 2020-04-18 RX ORDER — FLUTICASONE PROPIONATE 50 MCG
2 SPRAY, SUSPENSION (ML) NASAL DAILY
Status: DISCONTINUED | OUTPATIENT
Start: 2020-04-18 | End: 2020-04-18 | Stop reason: HOSPADM

## 2020-04-18 RX ORDER — FLUTICASONE PROPIONATE 50 MCG
SPRAY, SUSPENSION (ML) NASAL
Qty: 1 BOTTLE | Refills: 0 | Status: SHIPPED | OUTPATIENT
Start: 2020-04-18 | End: 2020-04-18 | Stop reason: SDUPTHER

## 2020-04-18 RX ORDER — FLUTICASONE PROPIONATE 50 MCG
SPRAY, SUSPENSION (ML) NASAL
Qty: 1 BOTTLE | Refills: 0 | Status: SHIPPED | OUTPATIENT
Start: 2020-04-18

## 2020-04-18 RX ADMIN — CLONIDINE HYDROCHLORIDE 0.2 MG: 0.1 TABLET ORAL at 09:45

## 2020-04-18 RX ADMIN — GUAIFENESIN 1200 MG: 600 TABLET, EXTENDED RELEASE ORAL at 09:44

## 2020-04-18 RX ADMIN — CHOLECALCIFEROL TAB 25 MCG (1000 UNIT) 2 TABLET: 25 TAB at 09:44

## 2020-04-18 RX ADMIN — FAMOTIDINE 20 MG: 20 TABLET ORAL at 09:45

## 2020-04-18 RX ADMIN — Medication 10 ML: at 05:50

## 2020-04-18 RX ADMIN — FLUTICASONE PROPIONATE 2 SPRAY: 50 SPRAY, METERED NASAL at 12:15

## 2020-04-18 RX ADMIN — INSULIN LISPRO 3 UNITS: 100 INJECTION, SOLUTION INTRAVENOUS; SUBCUTANEOUS at 09:45

## 2020-04-18 RX ADMIN — ASPIRIN 81 MG CHEWABLE TABLET 81 MG: 81 TABLET CHEWABLE at 09:44

## 2020-04-18 RX ADMIN — PRAVASTATIN SODIUM 40 MG: 20 TABLET ORAL at 09:45

## 2020-04-18 RX ADMIN — METHYLPREDNISOLONE SODIUM SUCCINATE 40 MG: 40 INJECTION, POWDER, FOR SOLUTION INTRAMUSCULAR; INTRAVENOUS at 05:49

## 2020-04-18 RX ADMIN — HEPARIN SODIUM 5000 UNITS: 5000 INJECTION INTRAVENOUS; SUBCUTANEOUS at 05:48

## 2020-04-18 RX ADMIN — VERAPAMIL HYDROCHLORIDE 180 MG: 180 TABLET ORAL at 12:15

## 2020-04-18 RX ADMIN — AZITHROMYCIN MONOHYDRATE 500 MG: 500 INJECTION, POWDER, LYOPHILIZED, FOR SOLUTION INTRAVENOUS at 09:52

## 2020-04-18 RX ADMIN — IPRATROPIUM BROMIDE AND ALBUTEROL SULFATE 3 ML: .5; 3 SOLUTION RESPIRATORY (INHALATION) at 04:40

## 2020-04-18 RX ADMIN — METOPROLOL TARTRATE 200 MG: 50 TABLET, FILM COATED ORAL at 09:44

## 2020-04-18 NOTE — PROGRESS NOTES
Problem: Falls - Risk of  Goal: *Absence of Falls  Description: Document Jaspreet Bustos Fall Risk and appropriate interventions in the flowsheet.   Outcome: Progressing Towards Goal  Note: Fall Risk Interventions:  Mobility Interventions: Bed/chair exit alarm    Mentation Interventions: Adequate sleep, hydration, pain control    Medication Interventions: Bed/chair exit alarm    Elimination Interventions: Bed/chair exit alarm    History of Falls Interventions: Bed/chair exit alarm

## 2020-04-18 NOTE — PROGRESS NOTES
Last night after discharge summary and after discharge planning patient was found on the floor next to her bed.     Good response team was called in-guarded evaluation patient complained about headache, no external head injury no other injuries patient was otherwise back to her normal weight,    CT head without contrast was done which was unremarkable    Today alert awake oriented otherwise asymptomatic  Visit Vitals  /87 (BP 1 Location: Right arm, BP Patient Position: At rest)   Pulse 100   Temp 97.9 °F (36.6 °C)   Resp 16   Ht 5' 4\" (1.626 m)   Wt 72.6 kg (160 lb)   SpO2 97%   Breastfeeding No   BMI 27.46 kg/m²     Plan  Continue discharge as originally planned  Defer further management of blood pressure as an outpatient by PCP

## 2020-04-18 NOTE — PROGRESS NOTES
Spoke to patient's daughter Viraj Castellanos) via phone to inform of fall. Patient's daughter stated, aJnell Lot was probably doing something she wasn't supposed to be doing, she's so hard-headed. \" I explained the need for imaging and daughter agreed. Will continue to monitor patient.

## 2020-04-18 NOTE — PROGRESS NOTES
Spoke to daughter Shanel Sweeney) concerning order for discharge. Daughter stated that she would be to pick her up approx 1pm. Spoke with LILA Roa) concerning any needs for discharge, stated patient has no needs. Will continue to monitor.

## 2020-04-18 NOTE — PROGRESS NOTES
Discharge rescheduled to today, all needs addressed, daughter will . EAMON RIVERS [facility] following.       REDD Murphy  Case Management  [phone]

## 2020-04-18 NOTE — PROGRESS NOTES
Patient education provided to patient and daughter Leia Machado), verbalized having no questions. Patient transported with hard RX, belongings, and discharge paperwork to front entrance via wheelchair by RN.

## 2020-04-18 NOTE — PROGRESS NOTES
Patient ambulated 2 laps around unit with supervision and no equipment. Pain Level Before FMP: Pt stated that she was not in any pain  Pain Level After FMP: Same    [x]  Alerted nurse. [x]  Call bell and phone within patient reach. [x]   Patient resting in bed with no apparent distress . NOTE: Chart reviewed. Pt fell yesterday afternoon in room. Denied any pain from fall. She is slightly confused. Pt was very talkative throughout session.      Thank you,  Arron Stoll, Rehab Technician

## 2020-04-19 ENCOUNTER — HOME CARE VISIT (OUTPATIENT)
Dept: SCHEDULING | Facility: HOME HEALTH | Age: 85
End: 2020-04-19
Payer: MEDICARE

## 2020-04-19 VITALS
OXYGEN SATURATION: 97 % | SYSTOLIC BLOOD PRESSURE: 120 MMHG | HEART RATE: 72 BPM | DIASTOLIC BLOOD PRESSURE: 70 MMHG | TEMPERATURE: 97.4 F | RESPIRATION RATE: 16 BRPM

## 2020-04-19 PROCEDURE — 3331090001 HH PPS REVENUE CREDIT

## 2020-04-19 PROCEDURE — 400013 HH SOC

## 2020-04-19 PROCEDURE — G0299 HHS/HOSPICE OF RN EA 15 MIN: HCPCS

## 2020-04-19 PROCEDURE — 3331090002 HH PPS REVENUE DEBIT

## 2020-04-19 PROCEDURE — G0151 HHCP-SERV OF PT,EA 15 MIN: HCPCS

## 2020-04-20 ENCOUNTER — PATIENT OUTREACH (OUTPATIENT)
Dept: CASE MANAGEMENT | Age: 85
End: 2020-04-20

## 2020-04-20 VITALS
TEMPERATURE: 97.4 F | RESPIRATION RATE: 16 BRPM | OXYGEN SATURATION: 100 % | DIASTOLIC BLOOD PRESSURE: 81 MMHG | SYSTOLIC BLOOD PRESSURE: 178 MMHG | HEART RATE: 60 BPM

## 2020-04-20 PROCEDURE — 3331090001 HH PPS REVENUE CREDIT

## 2020-04-20 PROCEDURE — 3331090002 HH PPS REVENUE DEBIT

## 2020-04-20 NOTE — HOME CARE
Patient did not discharge on 4/17/20 as planned due to s/p fall; Discharge noted over the weekend, [facility] will follow, [facility] referral processed by [facility] central intake. ASHUTOSH OCAMPO.

## 2020-04-20 NOTE — PROGRESS NOTES
Patient contacted regarding COVID-19  risk. Care Transition Nurse/ Ambulatory Care Manager contacted the family by telephone to perform post discharge assessment. Verified name and  with family as identifiers. Provided introduction to self, and explanation of the CTN/ACM role, and reason for call due to risk factors for infection and/or exposure to COVID-19. Symptoms reviewed with family who verbalized the following symptoms: no new symptoms and no worsening symptoms. Due to no new or worsening symptoms encounter was not routed to provider for escalation. Patient has following risk factors of: COPD, acute respiratory failure, diabetes and age. CTN/ACM reviewed discharge instructions, medical action plan and red flags such as increased shortness of breath, increasing fever and signs of decompensation with family who verbalized understanding. Discussed exposure protocols and quarantine with CDC Guidelines What to do if you are sick with coronavirus disease .  Family was given an opportunity for questions and concerns. The family agrees to contact the [facility] exposure line [phone], [facility]  ([phone]) and PCP office for questions related to their healthcare. CTN/ACM provided contact information for future needs. Reviewed and educated family on any new and changed medications related to discharge diagnosis. Patient/family/caregiver given information for [employer] and agrees to enroll no       Plan for follow-up call in 5-7 days based on severity of symptoms and risk factors.

## 2020-04-21 ENCOUNTER — HOME CARE VISIT (OUTPATIENT)
Dept: HOME HEALTH SERVICES | Facility: HOME HEALTH | Age: 85
End: 2020-04-21
Payer: MEDICARE

## 2020-04-21 PROCEDURE — 3331090002 HH PPS REVENUE DEBIT

## 2020-04-21 PROCEDURE — 3331090001 HH PPS REVENUE CREDIT

## 2020-04-22 ENCOUNTER — HOME CARE VISIT (OUTPATIENT)
Dept: HOME HEALTH SERVICES | Facility: HOME HEALTH | Age: 85
End: 2020-04-22
Payer: MEDICARE

## 2020-04-22 PROCEDURE — 3331090001 HH PPS REVENUE CREDIT

## 2020-04-22 PROCEDURE — 3331090002 HH PPS REVENUE DEBIT

## 2020-04-23 ENCOUNTER — HOME CARE VISIT (OUTPATIENT)
Dept: SCHEDULING | Facility: HOME HEALTH | Age: 85
End: 2020-04-23
Payer: MEDICARE

## 2020-04-23 VITALS
OXYGEN SATURATION: 99 % | TEMPERATURE: 97.9 F | HEART RATE: 67 BPM | DIASTOLIC BLOOD PRESSURE: 82 MMHG | SYSTOLIC BLOOD PRESSURE: 140 MMHG

## 2020-04-23 PROCEDURE — G0157 HHC PT ASSISTANT EA 15: HCPCS

## 2020-04-23 PROCEDURE — 3331090002 HH PPS REVENUE DEBIT

## 2020-04-23 PROCEDURE — G0300 HHS/HOSPICE OF LPN EA 15 MIN: HCPCS

## 2020-04-23 PROCEDURE — 3331090001 HH PPS REVENUE CREDIT

## 2020-04-24 ENCOUNTER — HOME CARE VISIT (OUTPATIENT)
Dept: SCHEDULING | Facility: HOME HEALTH | Age: 85
End: 2020-04-24
Payer: MEDICARE

## 2020-04-24 VITALS
RESPIRATION RATE: 16 BRPM | TEMPERATURE: 97.8 F | OXYGEN SATURATION: 98 % | DIASTOLIC BLOOD PRESSURE: 80 MMHG | HEART RATE: 67 BPM | SYSTOLIC BLOOD PRESSURE: 130 MMHG

## 2020-04-24 VITALS
DIASTOLIC BLOOD PRESSURE: 60 MMHG | OXYGEN SATURATION: 98 % | SYSTOLIC BLOOD PRESSURE: 120 MMHG | RESPIRATION RATE: 15 BRPM | TEMPERATURE: 97.4 F | HEART RATE: 70 BPM

## 2020-04-24 PROCEDURE — 3331090002 HH PPS REVENUE DEBIT

## 2020-04-24 PROCEDURE — G0157 HHC PT ASSISTANT EA 15: HCPCS

## 2020-04-24 PROCEDURE — 3331090001 HH PPS REVENUE CREDIT

## 2020-04-25 PROCEDURE — 3331090002 HH PPS REVENUE DEBIT

## 2020-04-25 PROCEDURE — 3331090001 HH PPS REVENUE CREDIT

## 2020-04-26 ENCOUNTER — HOME CARE VISIT (OUTPATIENT)
Dept: HOME HEALTH SERVICES | Facility: HOME HEALTH | Age: 85
End: 2020-04-26
Payer: MEDICARE

## 2020-04-26 PROCEDURE — 3331090001 HH PPS REVENUE CREDIT

## 2020-04-26 PROCEDURE — 3331090002 HH PPS REVENUE DEBIT

## 2020-04-27 ENCOUNTER — PATIENT OUTREACH (OUTPATIENT)
Dept: CASE MANAGEMENT | Age: 85
End: 2020-04-27

## 2020-04-27 PROCEDURE — 3331090001 HH PPS REVENUE CREDIT

## 2020-04-27 PROCEDURE — 3331090002 HH PPS REVENUE DEBIT

## 2020-04-27 NOTE — PROGRESS NOTES
Patient contacted regarding COVID-19 risk and screening. Care Transition Nurse/ Ambulatory Care Manager contacted the patient by telephone to perform follow-up assessment. No answer. Left message introducing self, role and reason for call. Requested return call. Contact information provided. Will attempt to contact at a later time.

## 2020-04-28 ENCOUNTER — HOME CARE VISIT (OUTPATIENT)
Dept: SCHEDULING | Facility: HOME HEALTH | Age: 85
End: 2020-04-28
Payer: MEDICARE

## 2020-04-28 VITALS
OXYGEN SATURATION: 98 % | RESPIRATION RATE: 22 BRPM | HEART RATE: 96 BPM | DIASTOLIC BLOOD PRESSURE: 84 MMHG | SYSTOLIC BLOOD PRESSURE: 167 MMHG | TEMPERATURE: 97 F

## 2020-04-28 PROCEDURE — 3331090002 HH PPS REVENUE DEBIT

## 2020-04-28 PROCEDURE — G0152 HHCP-SERV OF OT,EA 15 MIN: HCPCS

## 2020-04-28 PROCEDURE — G0157 HHC PT ASSISTANT EA 15: HCPCS

## 2020-04-28 PROCEDURE — 3331090001 HH PPS REVENUE CREDIT

## 2020-04-29 ENCOUNTER — HOME CARE VISIT (OUTPATIENT)
Dept: SCHEDULING | Facility: HOME HEALTH | Age: 85
End: 2020-04-29
Payer: MEDICARE

## 2020-04-29 VITALS
SYSTOLIC BLOOD PRESSURE: 120 MMHG | TEMPERATURE: 97.7 F | OXYGEN SATURATION: 98 % | HEART RATE: 56 BPM | DIASTOLIC BLOOD PRESSURE: 60 MMHG

## 2020-04-29 VITALS
SYSTOLIC BLOOD PRESSURE: 135 MMHG | HEART RATE: 67 BPM | TEMPERATURE: 97.7 F | OXYGEN SATURATION: 100 % | DIASTOLIC BLOOD PRESSURE: 75 MMHG

## 2020-04-29 PROCEDURE — 3331090002 HH PPS REVENUE DEBIT

## 2020-04-29 PROCEDURE — 3331090001 HH PPS REVENUE CREDIT

## 2020-04-29 PROCEDURE — G0300 HHS/HOSPICE OF LPN EA 15 MIN: HCPCS

## 2020-04-30 ENCOUNTER — HOME CARE VISIT (OUTPATIENT)
Dept: SCHEDULING | Facility: HOME HEALTH | Age: 85
End: 2020-04-30
Payer: MEDICARE

## 2020-04-30 VITALS
RESPIRATION RATE: 18 BRPM | HEART RATE: 70 BPM | SYSTOLIC BLOOD PRESSURE: 130 MMHG | TEMPERATURE: 97.9 F | DIASTOLIC BLOOD PRESSURE: 70 MMHG | OXYGEN SATURATION: 98 %

## 2020-04-30 PROCEDURE — 3331090001 HH PPS REVENUE CREDIT

## 2020-04-30 PROCEDURE — 3331090002 HH PPS REVENUE DEBIT

## 2020-04-30 PROCEDURE — G0157 HHC PT ASSISTANT EA 15: HCPCS

## 2020-05-01 ENCOUNTER — HOME CARE VISIT (OUTPATIENT)
Dept: SCHEDULING | Facility: HOME HEALTH | Age: 85
End: 2020-05-01
Payer: MEDICARE

## 2020-05-01 PROCEDURE — G0157 HHC PT ASSISTANT EA 15: HCPCS

## 2020-05-01 PROCEDURE — 3331090002 HH PPS REVENUE DEBIT

## 2020-05-01 PROCEDURE — G0300 HHS/HOSPICE OF LPN EA 15 MIN: HCPCS

## 2020-05-01 PROCEDURE — 3331090001 HH PPS REVENUE CREDIT

## 2020-05-02 VITALS
TEMPERATURE: 97.9 F | OXYGEN SATURATION: 99 % | HEART RATE: 55 BPM | SYSTOLIC BLOOD PRESSURE: 121 MMHG | DIASTOLIC BLOOD PRESSURE: 65 MMHG | RESPIRATION RATE: 19 BRPM

## 2020-05-02 PROCEDURE — 3331090002 HH PPS REVENUE DEBIT

## 2020-05-02 PROCEDURE — 3331090001 HH PPS REVENUE CREDIT

## 2020-05-03 PROCEDURE — 3331090001 HH PPS REVENUE CREDIT

## 2020-05-03 PROCEDURE — 3331090002 HH PPS REVENUE DEBIT

## 2020-05-04 ENCOUNTER — HOME CARE VISIT (OUTPATIENT)
Dept: SCHEDULING | Facility: HOME HEALTH | Age: 85
End: 2020-05-04
Payer: MEDICARE

## 2020-05-04 ENCOUNTER — PATIENT OUTREACH (OUTPATIENT)
Dept: CASE MANAGEMENT | Age: 85
End: 2020-05-04

## 2020-05-04 PROCEDURE — G0300 HHS/HOSPICE OF LPN EA 15 MIN: HCPCS

## 2020-05-04 PROCEDURE — 3331090001 HH PPS REVENUE CREDIT

## 2020-05-04 PROCEDURE — 3331090002 HH PPS REVENUE DEBIT

## 2020-05-04 NOTE — PROGRESS NOTES
Patient resolved from Transition of Care episode on 5/4/20. Patient/family has been provided the following resources and education related to COVID-19:                         Signs, symptoms and red flags related to COVID-19            CDC exposure and quarantine guidelines            [facility] exposure contact - [phone]            Contact for their local Department of Health               Patient currently reports that the following symptoms have improved:  no new symptoms and no worsening symptoms. No further outreach scheduled with this CTN/ACM. Episode of Care resolved. Patient has this CTN/ACM contact information if future needs arise.

## 2020-05-05 ENCOUNTER — HOME CARE VISIT (OUTPATIENT)
Dept: SCHEDULING | Facility: HOME HEALTH | Age: 85
End: 2020-05-05
Payer: MEDICARE

## 2020-05-05 PROCEDURE — G0157 HHC PT ASSISTANT EA 15: HCPCS

## 2020-05-05 PROCEDURE — 3331090002 HH PPS REVENUE DEBIT

## 2020-05-05 PROCEDURE — 3331090001 HH PPS REVENUE CREDIT

## 2020-05-06 VITALS
SYSTOLIC BLOOD PRESSURE: 124 MMHG | OXYGEN SATURATION: 98 % | TEMPERATURE: 96.8 F | TEMPERATURE: 98 F | DIASTOLIC BLOOD PRESSURE: 50 MMHG | HEART RATE: 62 BPM | SYSTOLIC BLOOD PRESSURE: 102 MMHG | OXYGEN SATURATION: 99 % | HEART RATE: 57 BPM | DIASTOLIC BLOOD PRESSURE: 64 MMHG

## 2020-05-06 PROCEDURE — 3331090001 HH PPS REVENUE CREDIT

## 2020-05-06 PROCEDURE — 3331090002 HH PPS REVENUE DEBIT

## 2020-05-07 ENCOUNTER — HOME CARE VISIT (OUTPATIENT)
Dept: HOME HEALTH SERVICES | Facility: HOME HEALTH | Age: 85
End: 2020-05-07
Payer: MEDICARE

## 2020-05-07 ENCOUNTER — HOME CARE VISIT (OUTPATIENT)
Dept: SCHEDULING | Facility: HOME HEALTH | Age: 85
End: 2020-05-07
Payer: MEDICARE

## 2020-05-07 VITALS
HEART RATE: 63 BPM | OXYGEN SATURATION: 100 % | DIASTOLIC BLOOD PRESSURE: 75 MMHG | TEMPERATURE: 98 F | SYSTOLIC BLOOD PRESSURE: 120 MMHG | RESPIRATION RATE: 15 BRPM

## 2020-05-07 PROCEDURE — 3331090002 HH PPS REVENUE DEBIT

## 2020-05-07 PROCEDURE — G0300 HHS/HOSPICE OF LPN EA 15 MIN: HCPCS

## 2020-05-07 PROCEDURE — 3331090001 HH PPS REVENUE CREDIT

## 2020-05-07 PROCEDURE — G0151 HHCP-SERV OF PT,EA 15 MIN: HCPCS

## 2020-05-08 PROCEDURE — 3331090001 HH PPS REVENUE CREDIT

## 2020-05-08 PROCEDURE — 3331090002 HH PPS REVENUE DEBIT

## 2020-05-09 VITALS
OXYGEN SATURATION: 99 % | TEMPERATURE: 97.7 F | HEART RATE: 53 BPM | DIASTOLIC BLOOD PRESSURE: 70 MMHG | SYSTOLIC BLOOD PRESSURE: 122 MMHG

## 2020-05-09 PROCEDURE — 3331090001 HH PPS REVENUE CREDIT

## 2020-05-09 PROCEDURE — 3331090002 HH PPS REVENUE DEBIT

## 2020-05-10 PROCEDURE — 3331090002 HH PPS REVENUE DEBIT

## 2020-05-10 PROCEDURE — 3331090001 HH PPS REVENUE CREDIT

## 2020-05-11 PROCEDURE — 3331090001 HH PPS REVENUE CREDIT

## 2020-05-11 PROCEDURE — 3331090002 HH PPS REVENUE DEBIT

## 2020-05-12 ENCOUNTER — HOME CARE VISIT (OUTPATIENT)
Dept: SCHEDULING | Facility: HOME HEALTH | Age: 85
End: 2020-05-12
Payer: MEDICARE

## 2020-05-12 VITALS — TEMPERATURE: 97.6 F | HEART RATE: 56 BPM | OXYGEN SATURATION: 99 %

## 2020-05-12 PROCEDURE — G0300 HHS/HOSPICE OF LPN EA 15 MIN: HCPCS

## 2020-05-12 PROCEDURE — 3331090001 HH PPS REVENUE CREDIT

## 2020-05-12 PROCEDURE — G0157 HHC PT ASSISTANT EA 15: HCPCS

## 2020-05-12 PROCEDURE — 3331090002 HH PPS REVENUE DEBIT

## 2020-05-13 PROCEDURE — 3331090001 HH PPS REVENUE CREDIT

## 2020-05-13 PROCEDURE — 3331090002 HH PPS REVENUE DEBIT

## 2020-05-14 ENCOUNTER — HOME CARE VISIT (OUTPATIENT)
Dept: SCHEDULING | Facility: HOME HEALTH | Age: 85
End: 2020-05-14
Payer: MEDICARE

## 2020-05-14 VITALS
SYSTOLIC BLOOD PRESSURE: 117 MMHG | DIASTOLIC BLOOD PRESSURE: 57 MMHG | RESPIRATION RATE: 17 BRPM | TEMPERATURE: 97.5 F | OXYGEN SATURATION: 98 % | HEART RATE: 52 BPM

## 2020-05-14 PROCEDURE — G0157 HHC PT ASSISTANT EA 15: HCPCS

## 2020-05-14 PROCEDURE — 3331090001 HH PPS REVENUE CREDIT

## 2020-05-14 PROCEDURE — 3331090002 HH PPS REVENUE DEBIT

## 2020-05-15 PROCEDURE — 3331090002 HH PPS REVENUE DEBIT

## 2020-05-15 PROCEDURE — 3331090001 HH PPS REVENUE CREDIT

## 2020-05-16 VITALS
HEART RATE: 56 BPM | SYSTOLIC BLOOD PRESSURE: 130 MMHG | DIASTOLIC BLOOD PRESSURE: 64 MMHG | TEMPERATURE: 98.8 F | OXYGEN SATURATION: 99 %

## 2020-05-16 PROCEDURE — 3331090001 HH PPS REVENUE CREDIT

## 2020-05-16 PROCEDURE — 3331090002 HH PPS REVENUE DEBIT

## 2020-05-17 PROCEDURE — 3331090002 HH PPS REVENUE DEBIT

## 2020-05-17 PROCEDURE — 3331090001 HH PPS REVENUE CREDIT

## 2020-05-18 PROCEDURE — 3331090002 HH PPS REVENUE DEBIT

## 2020-05-18 PROCEDURE — 3331090001 HH PPS REVENUE CREDIT

## 2020-05-19 ENCOUNTER — HOME CARE VISIT (OUTPATIENT)
Dept: SCHEDULING | Facility: HOME HEALTH | Age: 85
End: 2020-05-19
Payer: MEDICARE

## 2020-05-19 VITALS
OXYGEN SATURATION: 98 % | DIASTOLIC BLOOD PRESSURE: 70 MMHG | TEMPERATURE: 98.7 F | HEART RATE: 77 BPM | SYSTOLIC BLOOD PRESSURE: 150 MMHG | RESPIRATION RATE: 15 BRPM

## 2020-05-19 PROCEDURE — G0300 HHS/HOSPICE OF LPN EA 15 MIN: HCPCS

## 2020-05-19 PROCEDURE — 3331090001 HH PPS REVENUE CREDIT

## 2020-05-19 PROCEDURE — 3331090002 HH PPS REVENUE DEBIT

## 2020-05-19 PROCEDURE — G0151 HHCP-SERV OF PT,EA 15 MIN: HCPCS

## 2020-05-19 PROCEDURE — 400013 HH SOC

## 2020-05-20 VITALS
TEMPERATURE: 98.1 F | OXYGEN SATURATION: 97 % | DIASTOLIC BLOOD PRESSURE: 72 MMHG | HEART RATE: 57 BPM | SYSTOLIC BLOOD PRESSURE: 112 MMHG

## 2020-10-01 ENCOUNTER — TRANSCRIBE ORDER (OUTPATIENT)
Dept: SCHEDULING | Age: 85
End: 2020-10-01

## 2020-10-01 DIAGNOSIS — Z12.31 VISIT FOR SCREENING MAMMOGRAM: Primary | ICD-10-CM

## 2020-11-30 ENCOUNTER — HOSPITAL ENCOUNTER (OUTPATIENT)
Dept: BONE DENSITY | Age: 85
Discharge: HOME | End: 2020-11-30
Attending: NURSE PRACTITIONER
Payer: MEDICARE

## 2020-11-30 ENCOUNTER — HOSPITAL ENCOUNTER (OUTPATIENT)
Dept: MAMMOGRAPHY | Age: 85
Discharge: HOME | End: 2020-11-30
Attending: NURSE PRACTITIONER
Payer: MEDICARE

## 2020-11-30 DIAGNOSIS — Z78.0 MENOPAUSE: ICD-10-CM

## 2020-11-30 DIAGNOSIS — Z12.31 VISIT FOR SCREENING MAMMOGRAM: ICD-10-CM

## 2020-11-30 PROCEDURE — 77063 BREAST TOMOSYNTHESIS BI: CPT

## 2020-11-30 PROCEDURE — 77080 DXA BONE DENSITY AXIAL: CPT

## 2020-12-20 ENCOUNTER — APPOINTMENT (OUTPATIENT)
Dept: GENERAL RADIOLOGY | Age: 85
End: 2020-12-20
Attending: EMERGENCY MEDICINE
Payer: MEDICARE

## 2020-12-20 ENCOUNTER — HOSPITAL ENCOUNTER (EMERGENCY)
Age: 85
Discharge: HOME | End: 2020-12-20
Attending: EMERGENCY MEDICINE
Payer: MEDICARE

## 2020-12-20 VITALS
TEMPERATURE: 98.6 F | SYSTOLIC BLOOD PRESSURE: 182 MMHG | BODY MASS INDEX: 27.46 KG/M2 | DIASTOLIC BLOOD PRESSURE: 79 MMHG | HEIGHT: 64 IN | HEART RATE: 81 BPM | OXYGEN SATURATION: 94 % | RESPIRATION RATE: 20 BRPM

## 2020-12-20 DIAGNOSIS — R03.0 ELEVATED BLOOD PRESSURE READING: ICD-10-CM

## 2020-12-20 DIAGNOSIS — R06.2 WHEEZING: ICD-10-CM

## 2020-12-20 DIAGNOSIS — J20.9 ACUTE BRONCHITIS, UNSPECIFIED ORGANISM: Primary | ICD-10-CM

## 2020-12-20 PROCEDURE — 71045 X-RAY EXAM CHEST 1 VIEW: CPT

## 2020-12-20 PROCEDURE — 94640 AIRWAY INHALATION TREATMENT: CPT

## 2020-12-20 PROCEDURE — 74011000250 HC RX REV CODE- 250: Performed by: EMERGENCY MEDICINE

## 2020-12-20 PROCEDURE — 99283 EMERGENCY DEPT VISIT LOW MDM: CPT

## 2020-12-20 PROCEDURE — 74011636637 HC RX REV CODE- 636/637: Performed by: EMERGENCY MEDICINE

## 2020-12-20 PROCEDURE — 87635 SARS-COV-2 COVID-19 AMP PRB: CPT

## 2020-12-20 RX ORDER — PREDNISONE 20 MG/1
20 TABLET ORAL DAILY
Qty: 4 TAB | Refills: 0 | Status: SHIPPED | OUTPATIENT
Start: 2020-12-21 | End: 2020-12-25

## 2020-12-20 RX ORDER — PREDNISONE 20 MG/1
60 TABLET ORAL
Status: COMPLETED | OUTPATIENT
Start: 2020-12-20 | End: 2020-12-20

## 2020-12-20 RX ORDER — ALBUTEROL SULFATE 90 UG/1
2 AEROSOL, METERED RESPIRATORY (INHALATION)
Qty: 1 INHALER | Refills: 0 | Status: SHIPPED | OUTPATIENT
Start: 2020-12-20

## 2020-12-20 RX ORDER — AZITHROMYCIN 250 MG/1
TABLET, FILM COATED ORAL
Qty: 6 TAB | Refills: 0 | Status: SHIPPED | OUTPATIENT
Start: 2020-12-20 | End: 2020-12-25

## 2020-12-20 RX ORDER — IPRATROPIUM BROMIDE AND ALBUTEROL SULFATE 2.5; .5 MG/3ML; MG/3ML
3 SOLUTION RESPIRATORY (INHALATION)
Status: COMPLETED | OUTPATIENT
Start: 2020-12-20 | End: 2020-12-20

## 2020-12-20 RX ADMIN — PREDNISONE 60 MG: 20 TABLET ORAL at 17:40

## 2020-12-20 RX ADMIN — IPRATROPIUM BROMIDE AND ALBUTEROL SULFATE 3 ML: .5; 3 SOLUTION RESPIRATORY (INHALATION) at 17:41

## 2020-12-20 NOTE — ED NOTES
Patient discharged. Patient was given discharge instructions. Patient verbalized understanding of discharge instructions.

## 2020-12-21 ENCOUNTER — PATIENT OUTREACH (OUTPATIENT)
Dept: CASE MANAGEMENT | Age: 85
End: 2020-12-21

## 2020-12-21 NOTE — DISCHARGE INSTRUCTIONS
Patient Education        Self quarantine at home for 14 days. Also follow the CDC, and [facility] [address] guidelines. Bronchitis: Care Instructions  Your Care Instructions    Bronchitis is inflammation of the bronchial tubes, which carry air to the lungs. The tubes swell and produce mucus, or phlegm. The mucus and inflamed bronchial tubes make you cough. You may have trouble breathing. Most cases of bronchitis are caused by viruses like those that cause colds. Antibiotics usually do not help and they may be harmful. Bronchitis usually develops rapidly and lasts about 2 to 3 weeks in otherwise healthy people. Follow-up care is a key part of your treatment and safety. Be sure to make and go to all appointments, and call your doctor if you are having problems. It's also a good idea to know your test results and keep a list of the medicines you take. How can you care for yourself at home? · Take all medicines exactly as prescribed. Call your doctor if you think you are having a problem with your medicine. · Get some extra rest.  · Take an over-the-counter pain medicine, such as acetaminophen (Tylenol), ibuprofen (Advil, Motrin), or naproxen (Aleve) to reduce fever and relieve body aches. Read and follow all instructions on the label. · Do not take two or more pain medicines at the same time unless the doctor told you to. Many pain medicines have acetaminophen, which is Tylenol. Too much acetaminophen (Tylenol) can be harmful. · Take an over-the-counter cough medicine that contains dextromethorphan to help quiet a dry, hacking cough so that you can sleep. Avoid cough medicines that have more than one active ingredient. Read and follow all instructions on the label. · Breathe moist air from a humidifier, hot shower, or sink filled with hot water. The heat and moisture will thin mucus so you can cough it out. · Do not smoke. Smoking can make bronchitis worse.  If you need help quitting, talk to your doctor about stop-smoking programs and medicines. These can increase your chances of quitting for good. When should you call for help? Call 911 anytime you think you may need emergency care. For example, call if:    · You have severe trouble breathing.    Call your doctor now or seek immediate medical care if:    · You have new or worse trouble breathing.     · You cough up dark brown or bloody mucus (sputum).     · You have a new or higher fever.     · You have a new rash.    Watch closely for changes in your health, and be sure to contact your doctor if:    · You cough more deeply or more often, especially if you notice more mucus or a change in the color of your mucus.     · You are not getting better as expected. Where can you learn more? Go to http://www.gray.com/  Enter H333 in the search box to learn more about \"Bronchitis: Care Instructions. \"  Current as of: June 9, 2019Content Version: 12.4  © 5721-8811 [employer]wise, [employer]. Care instructions adapted under license by [employer] (which disclaims liability or warranty for this information). If you have questions about a medical condition or this instruction, always ask your healthcare professional. [address] any warranty or liability for your use of this information.

## 2020-12-21 NOTE — ED NOTES
Dr Tiarra Kearney reviewing DC instructions with pt family member on the phone. Bedside introduction made to pt with Sweta Lau RN; pt reports she feels \"pretty good\" except when coughing. Affect calm/conversant with regular/non labored respirations noted. Instructed to return immediately for worsening shortness of breath/other concerns.

## 2020-12-21 NOTE — ED NOTES
COVID swab sent; pt tolerated well. Ambulatory independently to vehicle/pt daughter driving her home.

## 2020-12-21 NOTE — ED PROVIDER NOTES
EMERGENCY DEPARTMENT HISTORY AND PHYSICAL EXAM    4:30  PM      Date: 12/20/2020  Patient Name: [facility]    History of Presenting Illness     Chief Complaint   Patient presents with    Cough         History Provided By: Patient    Additional History (Context): [facility] is a 80 y.o. female with diabetes and hypertension who presents with chief complaint of cough for the past few weeks. She states that it is a productive cough with whitish sputum. Also complains of wheezing. She states that she has been using her inhaler that helps a little. She denies any chest pain, dizziness, shortness of breath, fever, abdominal pain, flank pain, weakness, numbness, and no exposure to anyone with coronavirus to her knowledge. No other complaints. PCP: Davin Nye MD        Past History     Past Medical History:  Past Medical History:   Diagnosis Date    Bronchitis     Diabetes ([place])     Hyperlipemia     Hypertension     Pneumonia Jun-Jul 2019       Past Surgical History:  Past Surgical History:   Procedure Laterality Date    COLONOSCOPY N/A 6/7/2017    COLONOSCOPY / polypectomy performed by [facility] Gills, [facility] at [facility]    HX CATARACT REMOVAL      HX COLONOSCOPY      2011       Family History:  Family History   Problem Relation Age of Onset    Heart Disease Mother     Diabetes Father     Breast Cancer Daughter        Social History:  Social History     Tobacco Use    Smoking status: Never Smoker    Smokeless tobacco: Never Used   Substance Use Topics    Alcohol use: No    Drug use: No       Allergies:  No Known Allergies      Review of Systems       Review of Systems   Constitutional: Negative for chills and fever. HENT: Negative for congestion, rhinorrhea, sore throat and trouble swallowing. Eyes: Negative for visual disturbance. Respiratory: Positive for cough and wheezing. Negative for shortness of breath.     Cardiovascular: Negative for chest pain, palpitations and leg swelling. Gastrointestinal: Negative for abdominal pain, nausea and vomiting. Genitourinary: Negative for dysuria and flank pain. Musculoskeletal: Negative for arthralgias and neck stiffness. Skin: Negative for pallor and rash. Neurological: Negative for dizziness, weakness, numbness and headaches. Hematological: Does not bruise/bleed easily. Psychiatric/Behavioral: Negative for confusion and dysphoric mood. All other systems reviewed and are negative. Physical Exam     Visit Vitals  BP (!) 182/79   Pulse 81   Temp 98.6 °F (37 °C)   Resp 20   Ht 5' 4\" (1.626 m)   SpO2 94%   BMI 27.46 kg/m²         Physical Exam  Vitals signs and nursing note reviewed. Constitutional:       General: She is not in acute distress. Appearance: She is well-developed. She is not ill-appearing, toxic-appearing or diaphoretic. HENT:      Head: Normocephalic and atraumatic. Eyes:      General: No scleral icterus. Conjunctiva/sclera: Conjunctivae normal.      Pupils: Pupils are equal, round, and reactive to light. Neck:      Musculoskeletal: Normal range of motion and neck supple. Cardiovascular:      Rate and Rhythm: Normal rate. Pulses: Normal pulses. Heart sounds: Normal heart sounds. Comments: Capillary refill < 3 seconds  Pulmonary:      Effort: Pulmonary effort is normal. No respiratory distress. Breath sounds: No stridor. Wheezing (Scattered expiratory wheezes) present. No rhonchi or rales. Comments: Speaking full sentences  No respiratory distress  Abdominal:      General: Bowel sounds are normal. There is no distension. Palpations: Abdomen is soft. Tenderness: There is no abdominal tenderness. Musculoskeletal: Normal range of motion. General: No tenderness. Right lower leg: No edema. Left lower leg: No edema. Lymphadenopathy:      Cervical: No cervical adenopathy.    Skin:     General: Skin is warm and dry.      Coloration: Skin is not pale. Neurological:      General: No focal deficit present. Mental Status: She is alert and oriented to person, place, and time. Cranial Nerves: No cranial nerve deficit. Motor: No weakness. Coordination: Coordination normal.   Psychiatric:         Mood and Affect: Mood normal.           Diagnostic Study Results     Labs -  No results found for this or any previous visit (from the past 12 hour(s)). Radiologic Studies -   XR CHEST PORT   Final Result   IMPRESSION:   No acute cardiopulmonary process. Medical Decision Making   I am the first provider for this patient. I reviewed the vital signs, available nursing notes, past medical history, past surgical history, family history and social history. Vital Signs-Reviewed the patient's vital signs. Pulse Oximetry Analysis -  94% on room air (Interpretation) normal      Records Reviewed: Nursing Notes and Old Medical Records (Time of Review: 7:16 PM)    Provider Notes (Medical Decision Making): DDx: Acute bronchitis, COPD, pneumonia, COVID-19    DuoNeb, prednisone, chest x-ray, test for Covid      MDM    Medications   albuterol-ipratropium (DUO-NEB) 2.5 MG-0.5 MG/3 ML (3 mL Nebulization Given 12/20/20 1741)   predniSONE (DELTASONE) tablet 60 mg (60 mg Oral Given 12/20/20 1740)           ED Course: Progress Notes, Reevaluation, and Consults:  Nothing acute on chest x-ray    Patient feeling better after breathing treatment and steroids, wheezing significantly improved only a few scant wheezes remain. We will have her self quarantine. Follow-up Dr. Crystal Bar. Notify her PCP as well. I have reassessed the patient. I have discussed the workup, results and plan with the patient and patients daughter and they are in agreement. Patient is feeling better. Patient will be prescribed Z-Mateusz, prednisone, albuterol inhaler. Patient was discharge in stable condition.  Patient was given outpatient follow up. Patient is to return to emergency department if any new or worsening condition. Diagnosis     Clinical Impression:   1. Acute bronchitis, unspecified organism    2. Wheezing    3. Elevated blood pressure reading        Disposition: Discharged    Follow-up Information     Follow up With Specialties Details Why Contact Info    Myrna Marsh MD Internal Medicine Schedule an appointment as soon as possible for a visit in 1 day  [address] [facility] [address] [facility] Dodie Ho      Aure Marie MD General Practice  Notify your PCP that you have been quarantined, and your coronavirus result is pending. [place]  [address]      [address] [facility] EMERGENCY DEPT Emergency Medicine  As needed [address] [id number] [phone]           Patient's Medications   Start Taking    AZITHROMYCIN (ZITHROMAX Z-JACLYN) 250 MG TABLET    Take as written    PREDNISONE (DELTASONE) 20 MG TABLET    Take 20 mg by mouth daily for 4 days. Start this medication on Monday, 12/21/2020. Take with Breakfast   Continue Taking    ALBUTEROL-IPRATROPIUM (DUO-NEB) 2.5 MG-0.5 MG/3 ML NEBU    3 mL by Nebulization route every four (4) hours as needed for Wheezing. Indications: bronchi muscle spasm resulting from COPD    ASPIRIN 81 MG CHEWABLE TABLET    Take 81 mg by mouth daily. CETIRIZINE HCL (CETIRIZINE PO)    Take 10 mg by mouth nightly. CHOLECALCIFEROL, VITAMIN D3, (VITAMIN D3) 1,000 UNIT CAP    Take 1,000 Units by mouth daily. CLONIDINE (CATAPRESS) 0.2 MG TABLET    Take 0.2 mg by mouth two (2) times a day. FAMOTIDINE (PEPCID) 20 MG TABLET    Take 1 Tab by mouth daily. FLUTICASONE PROPIONATE (FLONASE) 50 MCG/ACTUATION NASAL SPRAY    2 spar each nostril Bid    GLIMEPIRIDE (AMARYL) 4 MG TABLET    Take 2 mg by mouth daily. GUAIFENESIN (MUCUS RELIEF ER) 1,200 MG TA12 ER TABLET    Take 1,200 mg by mouth two (2) times a day. LISINOPRIL (PRINIVIL, ZESTRIL) 20 MG TABLET    Take 40 mg by mouth daily. METOPROLOL (LOPRESSOR) 100 MG TABLET    Take 200 mg by mouth daily. METOPROLOL SUCCINATE 200 MG CSPX    Take 1 Tab by mouth daily. NEBULIZER    by Does Not Apply route. PRAVASTATIN (PRAVACHOL) 40 MG TABLET    Take 40 mg by mouth daily. SODIUM CHLORIDE (SALINE MIST) 0.65 % NASAL SQUEEZE BOTTLE    0.1 mL by Both Nostrils route as needed for Congestion. Indications: stuffy nose    VERAPAMIL SR (CALAN-SR) 180 MG CR TABLET    Take 180 mg by mouth daily. These Medications have changed    Modified Medication Previous Medication    ALBUTEROL (PROVENTIL HFA, VENTOLIN HFA, PROAIR HFA) 90 MCG/ACTUATION INHALER albuterol (PROVENTIL HFA, VENTOLIN HFA, PROAIR HFA) 90 mcg/actuation inhaler       Take 2 Puffs by inhalation every four (4) hours as needed for Wheezing or Shortness of Breath. Indications: chronic obstructive pulmonary disease    Take 2 Puffs by inhalation every four (4) hours as needed for Wheezing. Stop Taking    PREDNISONE (DELTASONE) 10 MG TABLET    1 tab po qid  for 3 days then 1 tab po tid for 3 days then 1 tab po bid for 3 days then 1 tab po daily 3 days         DO Bart Conway medical dictation software was used for portions of this report. Unintended transcription errors may occur. My signature above authenticates this document and my orders, the final    diagnosis (es), discharge prescription (s), and instructions in the Epic    record.

## 2020-12-21 NOTE — PROGRESS NOTES
.Patient contacted regarding COVID-19  risk. Discussed COVID-19 related testing which was pending at this time. Test results were pending. Patient informed of results, if available? Lives with daughter   Care Transition Nurse/ Ambulatory Care Manager/ LPN Care Coordinator contacted the caregiver by telephone to perform post discharge assessment. Verified name and  with caregiver as identifiers. Provided introduction to self, and explanation of the CTN/ACM/LPN role, and reason for call due to risk factors for infection and/or exposure to COVID-19. Symptoms reviewed with caregiver who verbalized the following symptoms: no new symptoms. Due to no new or worsening symptoms encounter was not routed to provider for escalation. Discussed follow-up appointments. If no appointment was previously scheduled, appointment scheduling offered: [facility] follow up appointment(s): No future appointments. Non-[facility] follow up appointment(s): n/a      Advance Care Planning:   Does patient have an Advance Directive: currently not on file; education provided     Patient has following risk factors of: COPD. CTN/ACM/LPN reviewed discharge instructions, medical action plan and red flags such as increased shortness of breath, increasing fever and signs of decompensation with caregiver who verbalized understanding. Discussed exposure protocols and quarantine with CDC Guidelines What to do if you are sick with coronavirus disease .  Caregiver was given an opportunity for questions and concerns. The caregiver agrees to contact the [facility] exposure line [phone], [facility] [address]  ([phone]) and PCP office for questions related to their healthcare. CTN/ACM provided contact information for future needs. Reviewed and educated caregiver on any new and changed medications related to discharge diagnosis.     Patient/family/caregiver given information for [employer] and agrees to enroll no  Patient's preferred e-mail:  n/a  Patient's preferred phone number: n/a  Based on Loop alert triggers, patient will be contacted by nurse care manager for worsening symptoms. Plan for follow-up call in 1-2 days based on severity of symptoms and risk factors. Patient seen in [facility] ED 12/20/2020 for coughing and wheezing Acte bronchitis. COVID19 TESTED. Daughter Rafa James asked f she could call ACM back to discuss medications when she gets back from the pharmacy. ACM said yes. Repeated Telephone number for her to return call.

## 2020-12-23 ENCOUNTER — PATIENT OUTREACH (OUTPATIENT)
Dept: CASE MANAGEMENT | Age: 85
End: 2020-12-23

## 2020-12-23 LAB — SARS-COV-2, COV2NT: NOT DETECTED

## 2020-12-23 NOTE — PROGRESS NOTES
.The patient was called for notification of a NEGATIVE test result for COVID-19. The following information was given to the patient:     The COVID-19 test, also known as novel coronavirus, result was negative   You probably were not infected at the time your sample was collected. However, that does not mean you will not get sick.  The test result only means that you did not have COVID-19 at the time of testing.  If you have no symptoms, continue to use preventive measures to protect yourself and others.  If you have been sick, there are many other potential infectious causes.  Contact your Primary Care Provider or Care Team for specific guidance, particularly if your symptoms worsen.    For more information visit the CDC website: DotProtection.gl

## 2021-01-04 ENCOUNTER — PATIENT OUTREACH (OUTPATIENT)
Dept: CASE MANAGEMENT | Age: 86
End: 2021-01-04

## 2021-01-04 NOTE — PROGRESS NOTES
.Patient resolved from [address] Transitions episode on 1/4/2021  Discussed COVID-19 related testing which was available at this time. Test results were negative. Patient informed of results, if available? yes     Patient/family has been provided the following resources and education related to COVID-19:                         Signs, symptoms and red flags related to COVID-19            [facility] exposure and quarantine guidelines            [employer] exposure contact - [phone]            Contact for their local Department of Health                 Patient currently reports that the following symptoms have improved:  no new symptoms and no worsening symptoms. No further outreach scheduled with this CTN/ACM/LPN/HC/ MA. Episode of Care resolved. Patient has this CTN/ACM/LPN/HC/MA contact information if future needs arise.

## 2021-05-18 ENCOUNTER — APPOINTMENT (OUTPATIENT)
Dept: GENERAL RADIOLOGY | Age: 86
DRG: 190 | End: 2021-05-18
Attending: EMERGENCY MEDICINE
Payer: MEDICARE

## 2021-05-18 ENCOUNTER — HOSPITAL ENCOUNTER (INPATIENT)
Age: 86
LOS: 4 days | Discharge: HOME HEALTH | DRG: 190 | End: 2021-05-22
Attending: EMERGENCY MEDICINE | Admitting: INTERNAL MEDICINE
Payer: MEDICARE

## 2021-05-18 DIAGNOSIS — R09.02 HYPOXEMIA: ICD-10-CM

## 2021-05-18 DIAGNOSIS — J44.1 ACUTE EXACERBATION OF CHRONIC OBSTRUCTIVE PULMONARY DISEASE (COPD) (HCC): Primary | ICD-10-CM

## 2021-05-18 LAB
ANION GAP SERPL CALC-SCNC: 8 MMOL/L (ref 3–18)
APPEARANCE UR: CLEAR
BASOPHILS # BLD: 0 K/UL (ref 0–0.1)
BASOPHILS NFR BLD: 0 % (ref 0–2)
BILIRUB UR QL: NEGATIVE
BNP SERPL-MCNC: 206 PG/ML (ref 0–1800)
BUN SERPL-MCNC: 24 MG/DL (ref 7–18)
BUN/CREAT SERPL: 19 (ref 12–20)
CALCIUM SERPL-MCNC: 9.5 MG/DL (ref 8.5–10.1)
CHLORIDE SERPL-SCNC: 103 MMOL/L (ref 100–111)
CO2 SERPL-SCNC: 30 MMOL/L (ref 21–32)
COLOR UR: YELLOW
COVID-19 RAPID TEST, COVR: NOT DETECTED
CREAT SERPL-MCNC: 1.27 MG/DL (ref 0.6–1.3)
DIFFERENTIAL METHOD BLD: ABNORMAL
EOSINOPHIL # BLD: 1.6 K/UL (ref 0–0.4)
EOSINOPHIL NFR BLD: 14 % (ref 0–5)
EPITH CASTS URNS QL MICRO: NORMAL /LPF (ref 0–5)
ERYTHROCYTE [DISTWIDTH] IN BLOOD BY AUTOMATED COUNT: 12.7 % (ref 11.6–14.5)
GLUCOSE SERPL-MCNC: 74 MG/DL (ref 74–99)
GLUCOSE UR STRIP.AUTO-MCNC: NEGATIVE MG/DL
HCT VFR BLD AUTO: 38.5 % (ref 35–45)
HGB BLD-MCNC: 12.7 G/DL (ref 12–16)
HGB UR QL STRIP: NEGATIVE
INR PPP: 1.1 (ref 0.8–1.2)
KETONES UR QL STRIP.AUTO: NEGATIVE MG/DL
LEUKOCYTE ESTERASE UR QL STRIP.AUTO: ABNORMAL
LYMPHOCYTES # BLD: 2.7 K/UL (ref 0.9–3.6)
LYMPHOCYTES NFR BLD: 24 % (ref 21–52)
MAGNESIUM SERPL-MCNC: 2.1 MG/DL (ref 1.6–2.6)
MCH RBC QN AUTO: 27.3 PG (ref 24–34)
MCHC RBC AUTO-ENTMCNC: 33 G/DL (ref 31–37)
MCV RBC AUTO: 82.6 FL (ref 74–97)
MONOCYTES # BLD: 1.1 K/UL (ref 0.05–1.2)
MONOCYTES NFR BLD: 10 % (ref 3–10)
NEUTS SEG # BLD: 6 K/UL (ref 1.8–8)
NEUTS SEG NFR BLD: 52 % (ref 40–73)
NITRITE UR QL STRIP.AUTO: NEGATIVE
PH UR STRIP: 6.5 [PH] (ref 5–8)
PHOSPHATE SERPL-MCNC: 3.3 MG/DL (ref 2.5–4.9)
PLATELET # BLD AUTO: 251 K/UL (ref 135–420)
PLATELET COMMENTS,PCOM: ABNORMAL
PMV BLD AUTO: 11 FL (ref 9.2–11.8)
POTASSIUM SERPL-SCNC: 4.6 MMOL/L (ref 3.5–5.5)
PROT UR STRIP-MCNC: NEGATIVE MG/DL
PROTHROMBIN TIME: 14.5 SEC (ref 11.5–15.2)
RBC # BLD AUTO: 4.66 M/UL (ref 4.2–5.3)
RBC MORPH BLD: ABNORMAL
SODIUM SERPL-SCNC: 141 MMOL/L (ref 136–145)
SOURCE, COVRS: NORMAL
SP GR UR REFRACTOMETRY: 1.01 (ref 1–1.03)
TROPONIN I BLD-MCNC: <0.04 NG/ML (ref 0–0.08)
TROPONIN I BLD-MCNC: <0.04 NG/ML (ref 0–0.08)
TROPONIN I SERPL-MCNC: <0.08 NG/ML (ref 0–0.04)
UROBILINOGEN UR QL STRIP.AUTO: 0.2 EU/DL (ref 0.2–1)
WBC # BLD AUTO: 11.4 K/UL (ref 4.6–13.2)
WBC URNS QL MICRO: NORMAL /HPF (ref 0–4)

## 2021-05-18 PROCEDURE — 84100 ASSAY OF PHOSPHORUS: CPT

## 2021-05-18 PROCEDURE — 80048 BASIC METABOLIC PNL TOTAL CA: CPT

## 2021-05-18 PROCEDURE — 71045 X-RAY EXAM CHEST 1 VIEW: CPT

## 2021-05-18 PROCEDURE — 99285 EMERGENCY DEPT VISIT HI MDM: CPT

## 2021-05-18 PROCEDURE — 83880 ASSAY OF NATRIURETIC PEPTIDE: CPT

## 2021-05-18 PROCEDURE — 84484 ASSAY OF TROPONIN QUANT: CPT

## 2021-05-18 PROCEDURE — 83735 ASSAY OF MAGNESIUM: CPT

## 2021-05-18 PROCEDURE — 96374 THER/PROPH/DIAG INJ IV PUSH: CPT

## 2021-05-18 PROCEDURE — 93005 ELECTROCARDIOGRAM TRACING: CPT

## 2021-05-18 PROCEDURE — 65660000000 HC RM CCU STEPDOWN

## 2021-05-18 PROCEDURE — 85025 COMPLETE CBC W/AUTO DIFF WBC: CPT

## 2021-05-18 PROCEDURE — 94640 AIRWAY INHALATION TREATMENT: CPT

## 2021-05-18 PROCEDURE — 87635 SARS-COV-2 COVID-19 AMP PRB: CPT

## 2021-05-18 PROCEDURE — 74011000250 HC RX REV CODE- 250: Performed by: EMERGENCY MEDICINE

## 2021-05-18 PROCEDURE — 85610 PROTHROMBIN TIME: CPT

## 2021-05-18 PROCEDURE — 81001 URINALYSIS AUTO W/SCOPE: CPT

## 2021-05-18 PROCEDURE — 74011250636 HC RX REV CODE- 250/636: Performed by: EMERGENCY MEDICINE

## 2021-05-18 RX ORDER — IPRATROPIUM BROMIDE AND ALBUTEROL SULFATE 2.5; .5 MG/3ML; MG/3ML
3 SOLUTION RESPIRATORY (INHALATION)
Status: COMPLETED | OUTPATIENT
Start: 2021-05-18 | End: 2021-05-18

## 2021-05-18 RX ADMIN — METHYLPREDNISOLONE SODIUM SUCCINATE 125 MG: 125 INJECTION, POWDER, FOR SOLUTION INTRAMUSCULAR; INTRAVENOUS at 17:51

## 2021-05-18 RX ADMIN — IPRATROPIUM BROMIDE AND ALBUTEROL SULFATE 3 ML: .5; 3 SOLUTION RESPIRATORY (INHALATION) at 17:51

## 2021-05-18 RX ADMIN — IPRATROPIUM BROMIDE AND ALBUTEROL SULFATE 3 ML: .5; 3 SOLUTION RESPIRATORY (INHALATION) at 17:40

## 2021-05-18 NOTE — ED TRIAGE NOTES
Patient c/o cough and SOB. She states she has to stop to catch her breath.   She has had inhaler and nebulizer at home

## 2021-05-18 NOTE — ED PROVIDER NOTES
HPI     Past Medical History:   Diagnosis Date    Bronchitis     Diabetes ([facility] [address]     Hyperlipemia     Hypertension     Pneumonia Jun-Jul 2019       Past Surgical History:   Procedure Laterality Date    COLONOSCOPY N/A 6/7/2017    COLONOSCOPY / polypectomy performed by Patricia Palacio MD at [facility] ENDOSCOPY    HX CATARACT REMOVAL      HX COLONOSCOPY      2011         Family History:   Problem Relation Age of Onset    Heart Disease Mother     Diabetes Father     Breast Cancer Daughter        Social History     Socioeconomic History    Marital status:      Spouse name: Not on file    Number of children: Not on file    Years of education: Not on file    Highest education level: Not on file   Occupational History    Not on file   Social Needs    Financial resource strain: Not on file    Food insecurity     Worry: Not on file     Inability: Not on file    Transportation needs     Medical: Not on file     Non-medical: Not on file   Tobacco Use    Smoking status: Never Smoker    Smokeless tobacco: Never Used   Substance and Sexual Activity    Alcohol use: No    Drug use: No    Sexual activity: Not on file   Lifestyle    Physical activity     Days per week: Not on file     Minutes per session: Not on file    Stress: Not on file   Relationships    Social connections     Talks on phone: Not on file     Gets together: Not on file     Attends [employer] service: Not on file     Active member of club or organization: Not on file     Attends meetings of clubs or organizations: Not on file     Relationship status: Not on file    Intimate partner violence     Fear of current or ex partner: Not on file     Emotionally abused: Not on file     Physically abused: Not on file     Forced sexual activity: Not on file   Other Topics Concern    Not on file   Social History Narrative    Not on file         ALLERGIES: Patient has no known allergies.     Review of Systems   Constitutional: Negative for chills, diaphoresis, fatigue, fever and unexpected weight change. HENT: Negative for congestion, dental problem, ear discharge, ear pain, hearing loss, nosebleeds, postnasal drip, sinus pressure, sore throat, trouble swallowing and voice change. Eyes: Negative for photophobia, pain, discharge, redness and visual disturbance. Respiratory: Positive for cough, chest tightness, shortness of breath and wheezing. Negative for stridor. Cardiovascular: Negative for chest pain, palpitations and leg swelling. Gastrointestinal: Negative for abdominal pain, constipation, diarrhea, nausea and vomiting. Endocrine: Negative for polydipsia, polyphagia and polyuria. Genitourinary: Negative for difficulty urinating, dyspareunia, dysuria, flank pain, frequency, genital sores, hematuria, menstrual problem, pelvic pain, urgency, vaginal bleeding, vaginal discharge and vaginal pain. Musculoskeletal: Negative for arthralgias, back pain, joint swelling, myalgias, neck pain and neck stiffness. Skin: Negative for color change, rash and wound. Allergic/Immunologic: Negative for immunocompromised state. Neurological: Negative for dizziness, tremors, seizures, syncope, weakness, light-headedness, numbness and headaches. Hematological: Negative for adenopathy. Does not bruise/bleed easily. Psychiatric/Behavioral: Negative for agitation, confusion, decreased concentration, hallucinations, sleep disturbance and suicidal ideas. The patient is not nervous/anxious. All other systems reviewed and are negative. Vitals:    05/18/21 1509 05/18/21 1530 05/18/21 1554 05/18/21 1642   BP:  129/69     Pulse: 72 65 61 66   Resp: 19 18 16 19   SpO2: 100% 100% 100% 100%            Physical Exam  Vitals signs and nursing note reviewed. Constitutional:       General: She is in acute distress. Appearance: She is well-developed. She is ill-appearing. She is not diaphoretic.       Comments: 29-year-old -American female in severe respiratory distress. HENT:      Head: Normocephalic and atraumatic. Right Ear: External ear normal.      Left Ear: External ear normal.      Nose: Nose normal.      Mouth/Throat:      Pharynx: No oropharyngeal exudate. Eyes:      General: No scleral icterus. Right eye: No discharge. Left eye: No discharge. Conjunctiva/sclera: Conjunctivae normal.      Pupils: Pupils are equal, round, and reactive to light. Neck:      Musculoskeletal: Normal range of motion and neck supple. Thyroid: No thyromegaly. Vascular: No JVD. Trachea: No tracheal deviation. Cardiovascular:      Rate and Rhythm: Normal rate and regular rhythm. Heart sounds: Normal heart sounds. No murmur. No friction rub. No gallop. Pulmonary:      Effort: Respiratory distress present. Breath sounds: No stridor. Wheezing and rhonchi present. No rales. Comments: Scattered bilateral expiratory wheezing and rhonchi  Chest:      Chest wall: No tenderness. Abdominal:      General: Bowel sounds are normal. There is no distension. Palpations: Abdomen is soft. There is no mass. Tenderness: There is no abdominal tenderness. There is no right CVA tenderness, left CVA tenderness, guarding or rebound. Genitourinary:     Vagina: Normal.   Musculoskeletal: Normal range of motion. General: No tenderness. Lymphadenopathy:      Cervical: No cervical adenopathy. Skin:     General: Skin is warm and dry. Capillary Refill: Capillary refill takes less than 2 seconds. Coloration: Skin is not pale. Findings: No erythema or rash. Neurological:      General: No focal deficit present. Mental Status: She is alert and oriented to person, place, and time. Mental status is at baseline. Cranial Nerves: No cranial nerve deficit. Deep Tendon Reflexes: Reflexes are normal and symmetric.    Psychiatric:         Mood and Affect: Mood normal.         Behavior: Behavior normal.         Thought Content:  Thought content normal.         Judgment: Judgment normal.          MDM  Number of Diagnoses or Management Options  Acute exacerbation of chronic obstructive pulmonary disease (COPD) (HCC)  Hypoxemia  Diagnosis management comments: Differential diagnosis includes: COPD exacerbation, bronchitis, pneumonia       Amount and/or Complexity of Data Reviewed  Clinical lab tests: ordered and reviewed  Tests in the radiology section of CPT®: ordered and reviewed  Tests in the medicine section of CPT®: ordered and reviewed  Discussion of test results with the performing providers: yes  Obtain history from someone other than the patient: yes  Review and summarize past medical records: yes  Discuss the patient with other providers: yes    Risk of Complications, Morbidity, and/or Mortality  Presenting problems: high  Diagnostic procedures: high  Management options: high           Procedures          Orders Placed This Encounter    XR CHEST PORT     Standing Status:   Standing     Number of Occurrences:   1     Order Specific Question:   Reason for Exam     Answer:   shortness of breath    CBC WITH AUTOMATED DIFF     Standing Status:   Standing     Number of Occurrences:   1    METABOLIC PANEL, BASIC     Standing Status:   Standing     Number of Occurrences:   1    Troponin I     Standing Status:   Standing     Number of Occurrences:   1    PRO-BNP     Standing Status:   Standing     Number of Occurrences:   1    PROTHROMBIN TIME + INR     Standing Status:   Standing     Number of Occurrences:   1    MAGNESIUM     Standing Status:   Standing     Number of Occurrences:   1    PHOSPHORUS     Standing Status:   Standing     Number of Occurrences:   1    URINALYSIS W/ RFLX MICROSCOPIC     Standing Status:   Standing     Number of Occurrences:   1    POC TROPONIN-I     Standing Status:   Standing     Number of Occurrences:   1    EKG, 12 LEAD, INITIAL     Standing Status:   Standing Number of Occurrences:   1     Order Specific Question:   Reason for Exam:     Answer:   sob    albuterol-ipratropium (DUO-NEB) 2.5 MG-0.5 MG/3 ML     Order Specific Question:   MODE OF DELIVERY     Answer:   Nebulizer    albuterol-ipratropium (DUO-NEB) 2.5 MG-0.5 MG/3 ML     Order Specific Question:   MODE OF DELIVERY     Answer:   Nebulizer    methylPREDNISolone (PF) (Solu-MEDROL) injection 125 mg     Recent Results (from the past 12 hour(s))   EKG, 12 LEAD, INITIAL    Collection Time: 05/18/21  3:04 PM   Result Value Ref Range    Ventricular Rate 72 BPM    Atrial Rate 72 BPM    P-R Interval 258 ms    QRS Duration 80 ms    Q-T Interval 392 ms    QTC Calculation (Bezet) 429 ms    Calculated P Axis 69 degrees    Calculated R Axis 67 degrees    Calculated T Axis 69 degrees    Diagnosis       Sinus rhythm with 1st degree AV block  Otherwise normal ECG  When compared with ECG of 14-APR-2020 10:59,  AL interval has increased     CBC WITH AUTOMATED DIFF    Collection Time: 05/18/21  3:16 PM   Result Value Ref Range    WBC 11.4 4.6 - 13.2 K/uL    RBC 4.66 4.20 - 5.30 M/uL    HGB 12.7 12.0 - 16.0 g/dL    HCT 38.5 35.0 - 45.0 %    MCV 82.6 74.0 - 97.0 FL    MCH 27.3 24.0 - 34.0 PG    MCHC 33.0 31.0 - 37.0 g/dL    RDW 12.7 11.6 - 14.5 %    PLATELET 326 016 - 933 K/uL    MPV 11.0 9.2 - 11.8 FL    NEUTROPHILS 52 40 - 73 %    LYMPHOCYTES 24 21 - 52 %    MONOCYTES 10 3 - 10 %    EOSINOPHILS 14 (H) 0 - 5 %    BASOPHILS 0 0 - 2 %    ABS. NEUTROPHILS 6.0 1.8 - 8.0 K/UL    ABS. LYMPHOCYTES 2.7 0.9 - 3.6 K/UL    ABS. MONOCYTES 1.1 0.05 - 1.2 K/UL    ABS. EOSINOPHILS 1.6 (H) 0.0 - 0.4 K/UL    ABS.  BASOPHILS 0.0 0.0 - 0.1 K/UL    DF MANUAL      PLATELET COMMENTS ADEQUATE PLATELETS      RBC COMMENTS NORMOCYTIC, NORMOCHROMIC     METABOLIC PANEL, BASIC    Collection Time: 05/18/21  3:16 PM   Result Value Ref Range    Sodium 141 136 - 145 mmol/L    Potassium 4.6 3.5 - 5.5 mmol/L    Chloride 103 100 - 111 mmol/L    CO2 30 21 - 32 mmol/L Anion gap 8 3.0 - 18 mmol/L    Glucose 74 74 - 99 mg/dL    BUN 24 (H) 7.0 - 18 MG/DL    Creatinine 1.27 0.6 - 1.3 MG/DL    BUN/Creatinine ratio 19 12 - 20      GFR est AA 48 (L) >60 ml/min/1.73m2    GFR est non-AA 40 (L) >60 ml/min/1.73m2    Calcium 9.5 8.5 - 10.1 MG/DL   NT-PRO BNP    Collection Time: 05/18/21  3:16 PM   Result Value Ref Range    NT pro- 0 - 1,800 PG/ML   PROTHROMBIN TIME + INR    Collection Time: 05/18/21  3:16 PM   Result Value Ref Range    Prothrombin time 14.5 11.5 - 15.2 sec    INR 1.1 0.8 - 1.2     MAGNESIUM    Collection Time: 05/18/21  3:16 PM   Result Value Ref Range    Magnesium 2.1 1.6 - 2.6 mg/dL   POC TROPONIN-I    Collection Time: 05/18/21  5:26 PM   Result Value Ref Range    Troponin-I (POC) <0.04 0.00 - 0.08 ng/mL     XR CHEST PORT    (Results Pending)   Preliminary Interpretation by Dr. Daniel Jenkins -no acute process. 5:57 PM   Darci Bazan DO discussed care with Dr. Luis Enrique Mcnally. Standard discussion; including history of patients chief complaint, available diagnostic results, and treatment course. He agrees with plans for admission and will see the patient in consultation. Diagnosis:   1. Acute exacerbation of chronic obstructive pulmonary disease (COPD) ([facility] [address]    2. Hypoxemia          Disposition: Admitted    Follow-up Information    None         Patient's Medications   Start Taking    No medications on file   Continue Taking    ALBUTEROL (PROVENTIL HFA, VENTOLIN HFA, PROAIR HFA) 90 MCG/ACTUATION INHALER    Take 2 Puffs by inhalation every four (4) hours as needed for Wheezing or Shortness of Breath. Indications: chronic obstructive pulmonary disease    ALBUTEROL-IPRATROPIUM (DUO-NEB) 2.5 MG-0.5 MG/3 ML NEBU    3 mL by Nebulization route every four (4) hours as needed for Wheezing. Indications: bronchi muscle spasm resulting from COPD    ASPIRIN 81 MG CHEWABLE TABLET    Take 81 mg by mouth daily. CETIRIZINE HCL (CETIRIZINE PO)    Take 10 mg by mouth nightly. CHOLECALCIFEROL, VITAMIN D3, (VITAMIN D3) 1,000 UNIT CAP    Take 1,000 Units by mouth daily. CLONIDINE (CATAPRESS) 0.2 MG TABLET    Take 0.2 mg by mouth two (2) times a day. FAMOTIDINE (PEPCID) 20 MG TABLET    Take 1 Tab by mouth daily. FLUTICASONE PROPIONATE (FLONASE) 50 MCG/ACTUATION NASAL SPRAY    2 spar each nostril Bid    GLIMEPIRIDE (AMARYL) 4 MG TABLET    Take 2 mg by mouth daily. GUAIFENESIN (MUCUS RELIEF ER) 1,200 MG TA12 ER TABLET    Take 1,200 mg by mouth two (2) times a day. LISINOPRIL (PRINIVIL, ZESTRIL) 20 MG TABLET    Take 40 mg by mouth daily. METOPROLOL (LOPRESSOR) 100 MG TABLET    Take 200 mg by mouth daily. METOPROLOL SUCCINATE 200 MG CSPX    Take 1 Tab by mouth daily. NEBULIZER    by Does Not Apply route. PRAVASTATIN (PRAVACHOL) 40 MG TABLET    Take 40 mg by mouth daily. SODIUM CHLORIDE (SALINE MIST) 0.65 % NASAL SQUEEZE BOTTLE    0.1 mL by Both Nostrils route as needed for Congestion. Indications: stuffy nose    VERAPAMIL SR (CALAN-SR) 180 MG CR TABLET    Take 180 mg by mouth daily.      These Medications have changed    No medications on file   Stop Taking    No medications on file

## 2021-05-19 LAB
ATRIAL RATE: 72 BPM
CALCULATED P AXIS, ECG09: 69 DEGREES
CALCULATED R AXIS, ECG10: 67 DEGREES
CALCULATED T AXIS, ECG11: 69 DEGREES
D DIMER PPP FEU-MCNC: 2.02 UG/ML(FEU)
DIAGNOSIS, 93000: NORMAL
ERYTHROCYTE [DISTWIDTH] IN BLOOD BY AUTOMATED COUNT: 12.6 % (ref 11.6–14.5)
EST. AVERAGE GLUCOSE BLD GHB EST-MCNC: 131 MG/DL
EST. AVERAGE GLUCOSE BLD GHB EST-MCNC: 134 MG/DL
GLUCOSE BLD STRIP.AUTO-MCNC: 138 MG/DL (ref 70–110)
GLUCOSE BLD STRIP.AUTO-MCNC: 154 MG/DL (ref 70–110)
GLUCOSE BLD STRIP.AUTO-MCNC: 157 MG/DL (ref 70–110)
GLUCOSE BLD STRIP.AUTO-MCNC: 261 MG/DL (ref 70–110)
HBA1C MFR BLD: 6.2 % (ref 4.2–5.6)
HBA1C MFR BLD: 6.3 % (ref 4.2–5.6)
HCT VFR BLD AUTO: 39.4 % (ref 35–45)
HGB BLD-MCNC: 12.7 G/DL (ref 12–16)
MCH RBC QN AUTO: 27.3 PG (ref 24–34)
MCHC RBC AUTO-ENTMCNC: 32.2 G/DL (ref 31–37)
MCV RBC AUTO: 84.5 FL (ref 74–97)
P-R INTERVAL, ECG05: 258 MS
PLATELET # BLD AUTO: 244 K/UL (ref 135–420)
PMV BLD AUTO: 10.9 FL (ref 9.2–11.8)
Q-T INTERVAL, ECG07: 392 MS
QRS DURATION, ECG06: 80 MS
QTC CALCULATION (BEZET), ECG08: 429 MS
RBC # BLD AUTO: 4.66 M/UL (ref 4.2–5.3)
TROPONIN I SERPL-MCNC: <0.02 NG/ML (ref 0–0.04)
VENTRICULAR RATE, ECG03: 72 BPM
WBC # BLD AUTO: 7.3 K/UL (ref 4.6–13.2)

## 2021-05-19 PROCEDURE — 83735 ASSAY OF MAGNESIUM: CPT

## 2021-05-19 PROCEDURE — 97162 PT EVAL MOD COMPLEX 30 MIN: CPT

## 2021-05-19 PROCEDURE — 97165 OT EVAL LOW COMPLEX 30 MIN: CPT

## 2021-05-19 PROCEDURE — 83036 HEMOGLOBIN GLYCOSYLATED A1C: CPT

## 2021-05-19 PROCEDURE — 94761 N-INVAS EAR/PLS OXIMETRY MLT: CPT

## 2021-05-19 PROCEDURE — 97535 SELF CARE MNGMENT TRAINING: CPT

## 2021-05-19 PROCEDURE — 74011636637 HC RX REV CODE- 636/637: Performed by: INTERNAL MEDICINE

## 2021-05-19 PROCEDURE — 85379 FIBRIN DEGRADATION QUANT: CPT

## 2021-05-19 PROCEDURE — 85027 COMPLETE CBC AUTOMATED: CPT

## 2021-05-19 PROCEDURE — 74011000250 HC RX REV CODE- 250: Performed by: INTERNAL MEDICINE

## 2021-05-19 PROCEDURE — 94640 AIRWAY INHALATION TREATMENT: CPT

## 2021-05-19 PROCEDURE — 74011250636 HC RX REV CODE- 250/636: Performed by: INTERNAL MEDICINE

## 2021-05-19 PROCEDURE — 2709999900 HC NON-CHARGEABLE SUPPLY

## 2021-05-19 PROCEDURE — 80048 BASIC METABOLIC PNL TOTAL CA: CPT

## 2021-05-19 PROCEDURE — 65270000029 HC RM PRIVATE

## 2021-05-19 PROCEDURE — 99222 1ST HOSP IP/OBS MODERATE 55: CPT | Performed by: INTERNAL MEDICINE

## 2021-05-19 PROCEDURE — 77010033678 HC OXYGEN DAILY

## 2021-05-19 PROCEDURE — 74011250637 HC RX REV CODE- 250/637: Performed by: INTERNAL MEDICINE

## 2021-05-19 PROCEDURE — 97116 GAIT TRAINING THERAPY: CPT

## 2021-05-19 PROCEDURE — 82962 GLUCOSE BLOOD TEST: CPT

## 2021-05-19 PROCEDURE — 99232 SBSQ HOSP IP/OBS MODERATE 35: CPT | Performed by: INTERNAL MEDICINE

## 2021-05-19 PROCEDURE — 36415 COLL VENOUS BLD VENIPUNCTURE: CPT

## 2021-05-19 RX ORDER — POLYETHYLENE GLYCOL 3350 17 G/17G
17 POWDER, FOR SOLUTION ORAL DAILY PRN
Status: DISCONTINUED | OUTPATIENT
Start: 2021-05-19 | End: 2021-05-22 | Stop reason: HOSPADM

## 2021-05-19 RX ORDER — ACETAMINOPHEN 650 MG/1
650 SUPPOSITORY RECTAL
Status: DISCONTINUED | OUTPATIENT
Start: 2021-05-19 | End: 2021-05-22 | Stop reason: HOSPADM

## 2021-05-19 RX ORDER — PROMETHAZINE HYDROCHLORIDE 12.5 MG/1
12.5 TABLET ORAL
Status: DISCONTINUED | OUTPATIENT
Start: 2021-05-19 | End: 2021-05-22 | Stop reason: HOSPADM

## 2021-05-19 RX ORDER — MAGNESIUM SULFATE 100 %
4 CRYSTALS MISCELLANEOUS AS NEEDED
Status: DISCONTINUED | OUTPATIENT
Start: 2021-05-19 | End: 2021-05-19 | Stop reason: SDUPTHER

## 2021-05-19 RX ORDER — FAMOTIDINE 20 MG/1
20 TABLET, FILM COATED ORAL DAILY
Status: DISCONTINUED | OUTPATIENT
Start: 2021-05-19 | End: 2021-05-22 | Stop reason: HOSPADM

## 2021-05-19 RX ORDER — SODIUM CHLORIDE 0.9 % (FLUSH) 0.9 %
5-40 SYRINGE (ML) INJECTION EVERY 8 HOURS
Status: DISCONTINUED | OUTPATIENT
Start: 2021-05-19 | End: 2021-05-22 | Stop reason: HOSPADM

## 2021-05-19 RX ORDER — SODIUM CHLORIDE 0.9 % (FLUSH) 0.9 %
5-40 SYRINGE (ML) INJECTION AS NEEDED
Status: DISCONTINUED | OUTPATIENT
Start: 2021-05-19 | End: 2021-05-22 | Stop reason: HOSPADM

## 2021-05-19 RX ORDER — GUAIFENESIN 100 MG/5ML
81 LIQUID (ML) ORAL DAILY
Status: DISCONTINUED | OUTPATIENT
Start: 2021-05-19 | End: 2021-05-22 | Stop reason: HOSPADM

## 2021-05-19 RX ORDER — MONTELUKAST SODIUM 10 MG/1
10 TABLET ORAL
Status: DISCONTINUED | OUTPATIENT
Start: 2021-05-19 | End: 2021-05-22 | Stop reason: HOSPADM

## 2021-05-19 RX ORDER — INSULIN LISPRO 100 [IU]/ML
INJECTION, SOLUTION INTRAVENOUS; SUBCUTANEOUS
Status: DISCONTINUED | OUTPATIENT
Start: 2021-05-19 | End: 2021-05-22 | Stop reason: HOSPADM

## 2021-05-19 RX ORDER — MAGNESIUM SULFATE 100 %
4 CRYSTALS MISCELLANEOUS AS NEEDED
Status: DISCONTINUED | OUTPATIENT
Start: 2021-05-19 | End: 2021-05-22 | Stop reason: HOSPADM

## 2021-05-19 RX ORDER — PRAVASTATIN SODIUM 20 MG/1
40 TABLET ORAL DAILY
Status: DISCONTINUED | OUTPATIENT
Start: 2021-05-19 | End: 2021-05-20

## 2021-05-19 RX ORDER — INSULIN LISPRO 100 [IU]/ML
INJECTION, SOLUTION INTRAVENOUS; SUBCUTANEOUS
Status: DISCONTINUED | OUTPATIENT
Start: 2021-05-19 | End: 2021-05-19 | Stop reason: DRUGHIGH

## 2021-05-19 RX ORDER — DEXTROSE 50 % IN WATER (D50W) INTRAVENOUS SYRINGE
25-50 AS NEEDED
Status: DISCONTINUED | OUTPATIENT
Start: 2021-05-19 | End: 2021-05-19 | Stop reason: SDUPTHER

## 2021-05-19 RX ORDER — METOPROLOL SUCCINATE 50 MG/1
200 TABLET, EXTENDED RELEASE ORAL DAILY
Status: DISCONTINUED | OUTPATIENT
Start: 2021-05-19 | End: 2021-05-22 | Stop reason: HOSPADM

## 2021-05-19 RX ORDER — LISINOPRIL 40 MG/1
40 TABLET ORAL DAILY
Status: DISCONTINUED | OUTPATIENT
Start: 2021-05-19 | End: 2021-05-22 | Stop reason: HOSPADM

## 2021-05-19 RX ORDER — IPRATROPIUM BROMIDE AND ALBUTEROL SULFATE 2.5; .5 MG/3ML; MG/3ML
3 SOLUTION RESPIRATORY (INHALATION)
Status: DISCONTINUED | OUTPATIENT
Start: 2021-05-19 | End: 2021-05-22

## 2021-05-19 RX ORDER — IPRATROPIUM BROMIDE AND ALBUTEROL SULFATE 2.5; .5 MG/3ML; MG/3ML
3 SOLUTION RESPIRATORY (INHALATION)
Status: DISCONTINUED | OUTPATIENT
Start: 2021-05-19 | End: 2021-05-19

## 2021-05-19 RX ORDER — ONDANSETRON 2 MG/ML
4 INJECTION INTRAMUSCULAR; INTRAVENOUS
Status: DISCONTINUED | OUTPATIENT
Start: 2021-05-19 | End: 2021-05-22 | Stop reason: HOSPADM

## 2021-05-19 RX ORDER — ENOXAPARIN SODIUM 100 MG/ML
40 INJECTION SUBCUTANEOUS DAILY
Status: DISCONTINUED | OUTPATIENT
Start: 2021-05-19 | End: 2021-05-22 | Stop reason: HOSPADM

## 2021-05-19 RX ORDER — GUAIFENESIN 600 MG/1
600 TABLET, EXTENDED RELEASE ORAL EVERY 12 HOURS
Status: DISCONTINUED | OUTPATIENT
Start: 2021-05-19 | End: 2021-05-22 | Stop reason: HOSPADM

## 2021-05-19 RX ORDER — ACETAMINOPHEN 325 MG/1
650 TABLET ORAL
Status: DISCONTINUED | OUTPATIENT
Start: 2021-05-19 | End: 2021-05-22 | Stop reason: HOSPADM

## 2021-05-19 RX ORDER — BUDESONIDE 0.5 MG/2ML
500 INHALANT ORAL
Status: DISCONTINUED | OUTPATIENT
Start: 2021-05-19 | End: 2021-05-22 | Stop reason: HOSPADM

## 2021-05-19 RX ORDER — DEXTROSE 50 % IN WATER (D50W) INTRAVENOUS SYRINGE
25-50 AS NEEDED
Status: DISCONTINUED | OUTPATIENT
Start: 2021-05-19 | End: 2021-05-22 | Stop reason: HOSPADM

## 2021-05-19 RX ADMIN — IPRATROPIUM BROMIDE AND ALBUTEROL SULFATE 3 ML: .5; 3 SOLUTION RESPIRATORY (INHALATION) at 06:00

## 2021-05-19 RX ADMIN — BUDESONIDE 500 MCG: 0.5 SUSPENSION RESPIRATORY (INHALATION) at 20:06

## 2021-05-19 RX ADMIN — INSULIN LISPRO 9 UNITS: 100 INJECTION, SOLUTION INTRAVENOUS; SUBCUTANEOUS at 12:00

## 2021-05-19 RX ADMIN — LISINOPRIL 40 MG: 40 TABLET ORAL at 08:53

## 2021-05-19 RX ADMIN — ENOXAPARIN SODIUM 40 MG: 40 INJECTION SUBCUTANEOUS at 08:51

## 2021-05-19 RX ADMIN — METHYLPREDNISOLONE SODIUM SUCCINATE 40 MG: 40 INJECTION, POWDER, FOR SOLUTION INTRAMUSCULAR; INTRAVENOUS at 16:04

## 2021-05-19 RX ADMIN — GUAIFENESIN 600 MG: 600 TABLET, EXTENDED RELEASE ORAL at 21:45

## 2021-05-19 RX ADMIN — METOPROLOL SUCCINATE 200 MG: 50 TABLET, EXTENDED RELEASE ORAL at 08:52

## 2021-05-19 RX ADMIN — IPRATROPIUM BROMIDE AND ALBUTEROL SULFATE 3 ML: .5; 3 SOLUTION RESPIRATORY (INHALATION) at 08:08

## 2021-05-19 RX ADMIN — GUAIFENESIN 600 MG: 600 TABLET, EXTENDED RELEASE ORAL at 08:53

## 2021-05-19 RX ADMIN — ASPIRIN 81 MG: 81 TABLET, CHEWABLE ORAL at 08:53

## 2021-05-19 RX ADMIN — IPRATROPIUM BROMIDE AND ALBUTEROL SULFATE 3 ML: .5; 3 SOLUTION RESPIRATORY (INHALATION) at 13:40

## 2021-05-19 RX ADMIN — BUDESONIDE 500 MCG: 0.5 SUSPENSION RESPIRATORY (INHALATION) at 08:08

## 2021-05-19 RX ADMIN — METHYLPREDNISOLONE SODIUM SUCCINATE 40 MG: 40 INJECTION, POWDER, FOR SOLUTION INTRAMUSCULAR; INTRAVENOUS at 21:46

## 2021-05-19 RX ADMIN — METHYLPREDNISOLONE SODIUM SUCCINATE 40 MG: 40 INJECTION, POWDER, FOR SOLUTION INTRAMUSCULAR; INTRAVENOUS at 08:53

## 2021-05-19 RX ADMIN — AZITHROMYCIN 500 MG: 500 INJECTION, POWDER, LYOPHILIZED, FOR SOLUTION INTRAVENOUS at 08:51

## 2021-05-19 RX ADMIN — INSULIN LISPRO 3 UNITS: 100 INJECTION, SOLUTION INTRAVENOUS; SUBCUTANEOUS at 10:56

## 2021-05-19 RX ADMIN — MONTELUKAST 10 MG: 10 TABLET, FILM COATED ORAL at 21:45

## 2021-05-19 RX ADMIN — Medication 10 ML: at 16:05

## 2021-05-19 RX ADMIN — IPRATROPIUM BROMIDE AND ALBUTEROL SULFATE 3 ML: .5; 3 SOLUTION RESPIRATORY (INHALATION) at 20:06

## 2021-05-19 RX ADMIN — Medication 10 ML: at 06:40

## 2021-05-19 RX ADMIN — Medication 10 ML: at 21:46

## 2021-05-19 RX ADMIN — PRAVASTATIN SODIUM 40 MG: 20 TABLET ORAL at 08:52

## 2021-05-19 RX ADMIN — INSULIN LISPRO 2 UNITS: 100 INJECTION, SOLUTION INTRAVENOUS; SUBCUTANEOUS at 21:56

## 2021-05-19 RX ADMIN — FAMOTIDINE 20 MG: 20 TABLET ORAL at 08:53

## 2021-05-19 NOTE — ED NOTES
Assumed care of patient at this time. Patient sitting comfortably on stretcher with daughter at bedside. Remains on cardiac, BP, and pulse ox monitors. IV sites with s/sx of infiltration/infection. Patient receiving 2L O2 via NC. Denies any complaints at this time.

## 2021-05-19 NOTE — PROGRESS NOTES
Problem: Self Care Deficits Care Plan (Adult)  Goal: *Acute Goals and Plan of Care (Insert Text)  Outcome: Resolved/Met     OCCUPATIONAL THERAPY EVALUATION/DISCHARGE    Patient: Jd Cr (42 y.o. female)  Date: 5/19/2021  Primary Diagnosis: COPD with acute exacerbation ([facility] [address] [J44.1]  Hypoxia [R09.02]  Precautions: Skin  PLOF: Patient was independent with self-care and functional mobility PTA. Patient reports she has a rolling walker but does not use it    ASSESSMENT AND RECOMMENDATIONS:  Patient cleared to participate in OT evaluation by RN. Upon entering the room, patient was supine in bed with 3L nasal cannula donned, alert, and agreeable to participate in OT evaluation. Patient is independent - modified independent with basic self-care, modified independent with bed mobility and stand by assist with functional transfers using rolling walker. Patient with no c/o SOB this session and O2 > 97% throughout. Based on the objective data described below, the patient presents with no deficits that impede pt function with ADLs, functional transfers, and functional mobility. OT to d/c from caseload at this time. Skilled occupational therapy is not indicated at this time. Discharge Recommendations: Home with continued family supervision and Home Health  Further Equipment Recommendations for Discharge: N/A      SUBJECTIVE:   Patient stated I can do everything I need to do.  I just need to take breaks sometimes    OBJECTIVE DATA SUMMARY:     Past Medical History:   Diagnosis Date    Bronchitis     Diabetes ([facility] [address]     Hyperlipemia     Hypertension     Pneumonia Jun-Jul 2019     Past Surgical History:   Procedure Laterality Date    COLONOSCOPY N/A 6/7/2017    COLONOSCOPY / polypectomy performed by Marzena Pantoja MD at [facility] ENDOSCOPY    HX CATARACT REMOVAL      HX COLONOSCOPY      2011     Barriers to Learning/Limitations: None  Compensate with: visual, verbal, tactile, kinesthetic cues/model    Home Situation:   Home Situation  Home Environment: Private residence  One/Two Story Residence: Two story (states she lives mostly upstairs)  Lift Chair Available: No  Living Alone: No  Support Systems: Child(wiley), Denominational / heladio community, Friends \ neighbors  Patient Expects to be Discharged to[de-identified] Private residence  Current DME Used/Available at Home: [employer] or Shower Type: Tub/Shower combination  [x]     Right hand dominant   []     Left hand dominant    Cognitive/Behavioral Status:  Neurologic State: Alert  Orientation Level: Oriented X4  Cognition: Follows commands  Safety/Judgement: Fall prevention; Awareness of environment    Skin: Intact  Edema: None noted    Vision/Perceptual:                           Acuity: Within Defined Limits         Coordination: BUE     Fine Motor Skills-Upper: Left Intact; Right Intact    Gross Motor Skills-Upper: Left Intact; Right Intact    Balance:  Sitting: Intact  Standing: Impaired; Without support  Standing - Static: Good  Standing - Dynamic : Fair (+)    Strength: BUE    Strength: Generally decreased, functional              Tone & Sensation: BUE    Tone: Normal  Sensation: Intact                    Range of Motion: BUE    AROM: Within functional limits                         Functional Mobility and Transfers for ADLs:  Bed Mobility:     Supine to Sit: Modified independent     Scooting: Modified independent  Transfers:  Sit to Stand: Stand-by assistance  Stand to Sit: Stand-by assistance                Toilet Transfer : Stand-by assistance (simulation with recliner)                ADL Assessment:  Feeding: Independent    Oral Facial Hygiene/Grooming: Independent    Bathing: Modified independent    Upper Body Dressing: Independent    Lower Body Dressing: Modified independent    Toileting: Modified independent                ADL Intervention:                           Lower Body Dressing Assistance  Dressing Assistance: Modified independent  Socks:  Independent  Leg Crossed Method Used: Yes  Position Performed: Seated edge of bed         Cognitive Retraining  Safety/Judgement: Fall prevention; Awareness of environment      Pain:  Pain level pre-treatment: 0/10   Pain level post-treatment: 0/10   Pain Intervention(s): Medication (see MAR); Response to intervention: Nurse notified, See doc flow    Activity Tolerance:   Good    Please refer to the flowsheet for vital signs taken during this treatment. After treatment:   [x]  Patient left in no apparent distress sitting up in chair  []  Patient left in no apparent distress in bed  [x]  Call bell left within reach  [x]  Nursing notified  []  Caregiver present  [x]  alarm activated    COMMUNICATION/EDUCATION:   [x]      Role of Occupational Therapy in the acute care setting  [x]      Home safety education was provided and the patient/caregiver indicated understanding. [x]      Patient/family have participated as able and agree with findings and recommendations. []      Patient is unable to participate in plan of care at this time. Thank you for this referral.  Lelia Witt, OTR/L  Time Calculation: 29 mins      Eval Complexity: History: MEDIUM Complexity : Expanded review of history including physical, cognitive and psychosocial  history ; Examination: LOW Complexity : 1-3 performance deficits relating to physical, cognitive , or psychosocial skils that result in activity limitations and / or participation restrictions ;    Decision Making:LOW Complexity : No comorbidities that affect functional and no verbal or physical assistance needed to complete eval tasks

## 2021-05-19 NOTE — PROGRESS NOTES
Problem: Falls - Risk of  Goal: *Absence of Falls  Description: Document Preethi Camarillo Fall Risk and appropriate interventions in the flowsheet. Outcome: Progressing Towards Goal  Note: Fall Risk Interventions:  Mobility Interventions: Bed/chair exit alarm, Patient to call before getting OOB, Utilize walker, cane, or other assistive device, PT Consult for mobility concerns         Medication Interventions: Bed/chair exit alarm, Teach patient to arise slowly, Patient to call before getting OOB         History of Falls Interventions: Door open when patient unattended, Room close to nurse's station         Problem: Patient Education: Go to Patient Education Activity  Goal: Patient/Family Education  Outcome: Progressing Towards Goal     Problem: Patient Education: Go to Patient Education Activity  Goal: Patient/Family Education  Outcome: Progressing Towards Goal     Problem: Diabetes Self-Management  Goal: *Disease process and treatment process  Description: Define diabetes and identify own type of diabetes; list 3 options for treating diabetes. Outcome: Progressing Towards Goal  Goal: *Incorporating nutritional management into lifestyle  Description: Describe effect of type, amount and timing of food on blood glucose; list 3 methods for planning meals. Outcome: Progressing Towards Goal  Goal: *Incorporating physical activity into lifestyle  Description: State effect of exercise on blood glucose levels. Outcome: Progressing Towards Goal  Goal: *Developing strategies to promote health/change behavior  Description: Define the ABC's of diabetes; identify appropriate screenings, schedule and personal plan for screenings. Outcome: Progressing Towards Goal  Goal: *Using medications safely  Description: State effect of diabetes medications on diabetes; name diabetes medication taking, action and side effects.   Outcome: Progressing Towards Goal  Goal: *Monitoring blood glucose, interpreting and using results  Description: Identify recommended blood glucose targets  and personal targets. Outcome: Progressing Towards Goal  Goal: *Prevention, detection, treatment of acute complications  Description: List symptoms of hyper- and hypoglycemia; describe how to treat low blood sugar and actions for lowering  high blood glucose level. Outcome: Progressing Towards Goal  Goal: *Prevention, detection and treatment of chronic complications  Description: Define the natural course of diabetes and describe the relationship of blood glucose levels to long term complications of diabetes. Outcome: Progressing Towards Goal  Goal: *Developing strategies to address psychosocial issues  Description: Describe feelings about living with diabetes; identify support needed and support network  Outcome: Progressing Towards Goal  Goal: *Insulin pump training  Outcome: Progressing Towards Goal  Goal: *Sick day guidelines  Outcome: Progressing Towards Goal  Goal: *Patient Specific Goal (EDIT GOAL, INSERT TEXT)  Outcome: Progressing Towards Goal     Problem: Patient Education: Go to Patient Education Activity  Goal: Patient/Family Education  Outcome: Progressing Towards Goal     Problem: Pressure Injury - Risk of  Goal: *Prevention of pressure injury  Description: Document Amauri Scale and appropriate interventions in the flowsheet.   Outcome: Progressing Towards Goal  Note: Pressure Injury Interventions:       Moisture Interventions: Internal/External urinary devices, Absorbent underpads, Minimize layers    Activity Interventions: PT/OT evaluation, Pressure redistribution bed/mattress(bed type), Increase time out of bed    Mobility Interventions: Pressure redistribution bed/mattress (bed type), PT/OT evaluation    Nutrition Interventions: Document food/fluid/supplement intake                     Problem: Patient Education: Go to Patient Education Activity  Goal: Patient/Family Education  Outcome: Progressing Towards Goal     Problem: Patient Education: Go to Patient Education Activity  Goal: Patient/Family Education  Outcome: Progressing Towards Goal     Problem: Patient Education: Go to Patient Education Activity  Goal: Patient/Family Education  Outcome: Progressing Towards Goal

## 2021-05-19 NOTE — ED NOTES
Patient transported to [facility] by [facility].

## 2021-05-19 NOTE — H&P
History & Physical    Patient: Aurora Snow MRN: [id number]  CSN: [id number]    YOB: 1934  Age: 80 y.o. Sex: female      DOA: 5/18/2021  CC: shortness of breath and cough    PCP: Chriss Dominguez MD       HPI:     Aurora Snow is a 80 y.o. female with medical co-morbidities including HTN, Type 2DM, COPD, hyperlipidemia, presented from home with worsened shortness of breath. Her respiratory treatment at home did not help. She has associate with cough but no fever/chill. She has had her COVID-19 vaccination. No chest pain. In the ER, she was found hypoxemia and started oxygen supplement. She was treated for copd exacerbation with steroid and neb which improved her symptom. Review of Systems  GENERAL: No fever, ++ chill, No malaise   HEENT: No change in vision, no ear ache, + sore throat or sinus congestion. NECK: No pain or stiffness. PULMONARY: + shortness of breath, + cough or wheeze. Cardiovascular: no pnd / orthopnea, no Chest Pain  GASTROINTESTINAL: No abd pain, No nausea/vomiting, No diarrhea, No bright red blood per rectum. GENITOURINARY: No urinary frequency, No urgency or pain with urination. MUSCULOSKELETAL: No joint or muscle pain, no back pain, no recent trauma. DERMATOLOGIC: No rash, no itching, no lesions. ENDOCRINE: No polyuria, polydipsia,  No recent change in weight. HEMATOLOGICAL: No easy bruising or bleeding.    NEUROLOGIC: No headache, No seizures, No generalized weakness         Past Medical History:   Diagnosis Date    Bronchitis     Diabetes ([address]     Hyperlipemia     Hypertension     Pneumonia Jun-Jul 2019       Past Surgical History:   Procedure Laterality Date    COLONOSCOPY N/A 6/7/2017    COLONOSCOPY / polypectomy performed by Mary Alice Daugherty [facility] at [facility]    HX CATARACT REMOVAL      HX COLONOSCOPY      2011       Family History   Problem Relation Age of Onset    Heart Disease Mother  Diabetes Father     Breast Cancer Daughter        Social History     Socioeconomic History    Marital status:      Spouse name: Not on file    Number of children: Not on file    Years of education: Not on file    Highest education level: Not on file   Tobacco Use    Smoking status: Never Smoker    Smokeless tobacco: Never Used   Substance and Sexual Activity    Alcohol use: No    Drug use: No     Social Determinants of Health     Financial Resource Strain:     Difficulty of Paying Living Expenses:    Food Insecurity:     Worried About Running Out of Food in the Last Year:     [address] in the Last Year:    Transportation Needs:     Lack of Transportation (Medical):  Lack of Transportation (Non-Medical):    Physical Activity:     Days of Exercise per Week:     Minutes of Exercise per Session:    Stress:     Feeling of Stress :    Social Connections:     Frequency of Communication with Friends and Family:     Frequency of Social Gatherings with Friends and Family:     Attends Baptism Services:     Active Member of Clubs or Organizations:     Attends Club or Organization Meetings:     Marital Status:        Prior to Admission medications    Medication Sig Start Date End Date Taking? Authorizing Provider   albuterol (PROVENTIL HFA, VENTOLIN HFA, PROAIR HFA) 90 mcg/actuation inhaler Take 2 Puffs by inhalation every four (4) hours as needed for Wheezing or Shortness of Breath. Indications: chronic obstructive pulmonary disease 12/20/20  Yes Martin Hanna, DO   metoprolol succinate 200 mg CSpX Take 1 Tab by mouth daily. Yes Provider, Historical   fluticasone propionate (FLONASE) 50 mcg/actuation nasal spray 2 spar each nostril Bid  Patient taking differently: 2 Sprays by Both Nostrils route daily. 4/18/20  Yes Kirk Pearce MD   albuterol-ipratropium (DUO-NEB) 2.5 mg-0.5 mg/3 ml nebu 3 mL by Nebulization route every four (4) hours as needed for Wheezing.  Indications: bronchi muscle spasm resulting from COPD 4/17/20  Yes Jas Castillo MD   sodium chloride (SALINE MIST) 0.65 % nasal squeeze bottle 0.1 mL by Both Nostrils route as needed for Congestion. Indications: stuffy nose 2/20/20  Yes Yennifer Hanna,    guaiFENesin (MUCUS RELIEF ER) 1,200 mg Ta12 ER tablet Take 1,200 mg by mouth two (2) times a day. Yes Provider, Historical   famotidine (PEPCID) 20 mg tablet Take 1 Tab by mouth daily. Patient taking differently: Take 20 mg by mouth daily as needed. 7/3/19  Yes Neris Acevedo NP   aspirin 81 mg chewable tablet Take 81 mg by mouth daily. Yes Other, MD Linda   CETIRIZINE HCL (CETIRIZINE PO) Take 10 mg by mouth nightly. Yes Provider, Historical   NEBULIZER by Does Not Apply route. Yes Provider, Historical   Cholecalciferol, Vitamin D3, (VITAMIN D3) 1,000 unit cap Take 1,000 Units by mouth daily. Yes Other, MD Linda   cloNIDine (CATAPRESS) 0.2 mg tablet Take 0.2 mg by mouth two (2) times a day. Yes Other, MD Linda   glimepiride (AMARYL) 4 mg tablet Take 2 mg by mouth daily. Yes Other, MD Linda   lisinopril (PRINIVIL, ZESTRIL) 20 mg tablet Take 40 mg by mouth daily. Yes Other, MD Linda   pravastatin (PRAVACHOL) 40 mg tablet Take 40 mg by mouth daily. Yes Other, MD Linda   verapamil SR (CALAN-SR) 180 mg CR tablet Take 180 mg by mouth daily.      Yes Other, MD Linda       No Known Allergies           Physical Exam:      Visit Vitals  BP (!) 142/68   Pulse 67   Resp 13   SpO2 100%       Physical Exam:  Tele: sinus   General:  Cooperative, Not in acute distress, speaks in short sentence while in bed  HEENT: PERRL, EOMI, supple neck, no JVD, dry oral mucosa  Cardiovascular: S1S2 regular, no rub/gallop   Pulmonary: air entry bilaterally, +end expiratory wheezing, no crackle  GI:  Soft, non tender, non distended, +bs, no guarding   Extremities:  No pedal edema, +distal pulses appreciated   Neuro: AOx3, moving all extremities    Lab/Data Review:  Labs: Results: Chemistry Recent Labs     05/18/21  1516   GLU 74      K 4.6      CO2 30   BUN 24*   CREA 1.27   CA 9.5   AGAP 8   BUCR 19      CBC w/Diff Recent Labs     05/18/21  1516   WBC 11.4   RBC 4.66   HGB 12.7   HCT 38.5      GRANS 52   LYMPH 24   EOS 14*      Coagulation Recent Labs     05/18/21  1516   PTP 14.5   INR 1.1       Iron/Ferritin No results for input(s): IRON in the last 72 hours. No lab exists for component: TIBCCALC   BNP No results for input(s): BNPP in the last 72 hours. Cardiac Enzymes No results for input(s): CPK, CKND1, SHEA in the last 72 hours. No lab exists for component: CKRMB, TROIP   Liver Enzymes No results for input(s): TP, ALB, TBIL, AP in the last 72 hours. No lab exists for component: SGOT, GPT, DBIL   Thyroid Studies No results found for: T4, T3U, TSH, TSHEXT       All Micro Results     Procedure Component Value Units Date/Time    COVID-19 RAPID TEST [[id number]] Collected: 05/18/21 1844    Order Status: Completed Specimen: Nasopharyngeal Updated: 05/18/21 2147     Specimen source Nasopharyngeal        COVID-19 rapid test Not detected        Comment: Rapid Abbott ID Now       Rapid NAAT:  The specimen is NEGATIVE for SARS-CoV-2, the novel coronavirus associated with COVID-19. Negative results should be treated as presumptive and, if inconsistent with clinical signs and symptoms or necessary for patient management, should be tested with an alternative molecular assay. Negative results do not preclude SARS-CoV-2 infection and should not be used as the sole basis for patient management decisions. This test has been authorized by the FDA under an Emergency Use Authorization (EUA) for use by authorized laboratories.    Fact sheet for Healthcare Providers: ConventionUpdate.co.nz  Fact sheet for Patients: ConventionUpdate.co.nz       Methodology: Isothermal Nucleic Acid Amplification               Imaging Reviewed:  LELA Results (most recent):  Results from Hospital Encounter encounter on 05/18/21    XR CHEST PORT    Narrative  AP CHEST, PORTABLE    INDICATION: Shortness of breath    COMPARISON: Chest x-ray 12/20/2020    FINDINGS:  EKG leads overlie the patient. Cardiac silhouette is stable. Atherosclerosis noted. Diffuse interstitial  prominence, greater lower lungs. Trace blunting of the costophrenic sulci. No  pneumothorax. No acute osseous abnormalities are identified. Impression  1. Mild interstitial edema versus interstitial infiltrate. 2. Possible trace pleural effusions.  ]        Assessment:   Active Problems:  1)  Acute respiratory failure with hypoxia   2) COPD with acute exacerbation  3)  Acute bronchitis   4)  Type 2 DM   5)  Hypertension   6)  Hyperlipidemia   7)  Dehydration     Plan:     Admitted for IV steroid and oxygen supplement. Cont Azithromycin, Solumedrol, duoneb, budesonid  Add singulair  Resume her home medication including lisinopril/metoprolol/pravastatin.  ASA  pepcid   ICS   PT/OT   Palliative care consult consider      Risk of deterioration:  []Low    [x]Moderate  []High     Prophylaxis:  [x]Lovenox  []Coumadin  []Hep SQ  []SCDs  [x]H2B/PPI     Disposition:  []Home w/ Family   [x]HH PT,OT,RN   []SNF/LTC   []SAH/Rehab     Discussed Code Status:         [x]Full Code      []DNR         ___________________________________________________     Care Plan discussed with:    [x]Patient   []Family    []ED Care Manager  [x]ED Doc   []Specialist :  Total Time Coordinating Admission:  60    minutes    []Total Critical Care Time:       Caty Hamm MD  5/19/2021, 6:40 AM

## 2021-05-19 NOTE — INTERDISCIPLINARY ROUNDS
Interdisciplinary Round Note Patient Information: Patient Information: [facility], [place] [address] Reason for Admission: COPD with acute exacerbation ([facility] [address] [J44.1] Hypoxia [R09.02] Attending Provider:   Karin Mohs, MD 
 Past Medical History:  
Past Medical History:  
Diagnosis Date  Bronchitis  Diabetes ([facility] [address]  Hyperlipemia  Hypertension  Pneumonia Jun-Jul 2019 Hospital day: 1 Estimated discharge date: 5/22/2021 RRAT Score: Medium Risk 18 Total Score 3 Has Seen PCP in Last 6 Months (Yes=3, No=0)  
 5 Pt. Coverage (Medicare=5 , Medicaid, or Self-Pay=4) 10 Charlson Comorbidity Score (Age + Comorbid Conditions) Criteria that do not apply:  
 . Living with Significant Other. Assisted Living. LTAC. SNF. or  
Rehab Patient Length of Stay (>5 days = 3) IP Visits Last 12 Months (1-3=4, 4=9, >4=11) Goals for Today: sitting in chair, waling to bathroom VITAL SIGNS Vitals:  
 05/19/21 0808 05/19/21 1114 05/19/21 1340 05/19/21 1558 BP:  (!) 173/95  (!) 154/83 Pulse:  96  98 Resp:  18  16 Temp:  97.3 °F (36.3 °C)  97.8 °F (36.6 °C) SpO2: 100% 100% 98% 97% Lines, Drains, & Airways Peripheral IV 05/18/21 Right Hand-Site Assessment: Clean, dry, & intact [REMOVED] Peripheral IV 05/18/21 Right Antecubital-Site Assessment: Clean, dry, & intact VTE Prophylaxis Intake and Output:  
05/18 0701 - 05/19 1900 In: 36 [P.O.:820] Out: 600 [Urine:600] No intake/output data recorded. Current Diet: DIET DIABETIC CONSISTENT CARB Soft Solids Abdominal  
Abdominal Assessment: Obese Bowel Sounds: Active Recent Glucose Results:  
Lab Results Component Value Date/Time GLUCPOC 138 (H) 05/19/2021 04:10 PM  
 GLUCPOC 261 (H) 05/19/2021 11:31 AM  
 GLUCPOC 157 (H) 05/19/2021 07:38 AM  
 
 
  
IV Antibiotics? yes Activity Level: Activity Level: Up with Assistance Needs assistance with ADLs: no 
PT Consult Status: YES Current Immunizations: 
Immunization History Administered Date(s) Administered  Tdap 11/29/2017 Recommendations:  
Discharge Disposition: Home with Home Health COVID status:  n/a Will the patient require COVID testing prior to discharge for placement?: NO Medication Reconciliation Completed: YES Cardiac/Pulmonary Rehab Ordered:  YES Needs for Discharge:  None  Recommendations from IDR team:  None

## 2021-05-19 NOTE — PROGRESS NOTES
Problem: Falls - Risk of  Goal: *Absence of Falls  Description: Document Lily Gaitan Fall Risk and appropriate interventions in the flowsheet. Outcome: Progressing Towards Goal  Note: Fall Risk Interventions:  Mobility Interventions: Assess mobility with egress test, PT Consult for mobility concerns                             Problem: Patient Education: Go to Patient Education Activity  Goal: Patient/Family Education  Outcome: Progressing Towards Goal     Problem: Gas Exchange - Impaired  Goal: *Absence of hypoxia  Outcome: Progressing Towards Goal     Problem: Patient Education: Go to Patient Education Activity  Goal: Patient/Family Education  Outcome: Progressing Towards Goal     Problem: Diabetes Self-Management  Goal: *Disease process and treatment process  Description: Define diabetes and identify own type of diabetes; list 3 options for treating diabetes. Outcome: Progressing Towards Goal  Goal: *Incorporating nutritional management into lifestyle  Description: Describe effect of type, amount and timing of food on blood glucose; list 3 methods for planning meals. Outcome: Progressing Towards Goal  Goal: *Incorporating physical activity into lifestyle  Description: State effect of exercise on blood glucose levels. Outcome: Progressing Towards Goal  Goal: *Developing strategies to promote health/change behavior  Description: Define the ABC's of diabetes; identify appropriate screenings, schedule and personal plan for screenings. Outcome: Progressing Towards Goal  Goal: *Using medications safely  Description: State effect of diabetes medications on diabetes; name diabetes medication taking, action and side effects. Outcome: Progressing Towards Goal  Goal: *Monitoring blood glucose, interpreting and using results  Description: Identify recommended blood glucose targets  and personal targets.   Outcome: Progressing Towards Goal  Goal: *Prevention, detection, treatment of acute complications  Description: List symptoms of hyper- and hypoglycemia; describe how to treat low blood sugar and actions for lowering  high blood glucose level. Outcome: Progressing Towards Goal  Goal: *Prevention, detection and treatment of chronic complications  Description: Define the natural course of diabetes and describe the relationship of blood glucose levels to long term complications of diabetes.   Outcome: Progressing Towards Goal  Goal: *Developing strategies to address psychosocial issues  Description: Describe feelings about living with diabetes; identify support needed and support network  Outcome: Progressing Towards Goal  Goal: *Insulin pump training  Outcome: Progressing Towards Goal  Goal: *Sick day guidelines  Outcome: Progressing Towards Goal  Goal: *Patient Specific Goal (EDIT GOAL, INSERT TEXT)  Outcome: Progressing Towards Goal     Problem: Patient Education: Go to Patient Education Activity  Goal: Patient/Family Education  Outcome: Progressing Towards Goal

## 2021-05-19 NOTE — PROGRESS NOTES
[facility] Hospitalist Group  Progress Note    Patient: Jd Cr Age: 80 y.o. : 1934 MR#: [id number] SSN: [id number]  Date/Time: 2021    Subjective:     Pt reports she feels better. Assessment/Plan:   80year old female with h/o DM, COPD. After she presented from home with worsening shortness of breath. Noted to be hypoxic in the ED.    -Acute hypoxic respiratory failure, , rapid COVID negative  -Acute COPD exacerbation    HISTORY OF  -Type 2 DM  -Dyslipidemia    PLAN:  -Cont duonebs, IV steroids for now  -Check cardiac echo due to finding of pulm edema on cxr  -Cont bb, lisinopril  -Cont azithromycin for now  -Check D dimer    Called daughter and left a message.     Additional Notes:      Case discussed with:  [x]Patient  []Family  []Nursing  []Case Management  DVT Prophylaxis:  [x]Lovenox  []Hep SQ  []SCDs  []Coumadin   []On Heparin gtt    Objective:   VS:   Visit Vitals  BP (!) 173/95 (BP 1 Location: Left upper arm, BP Patient Position: Sitting)   Pulse 96   Temp 97.3 °F (36.3 °C)   Resp 18   SpO2 98%   Breastfeeding Unknown      Tmax/24hrs: Temp (24hrs), Av.5 °F (36.4 °C), Min:97.3 °F (36.3 °C), Max:97.6 °F (36.4 °C)    Input/Output:     Intake/Output Summary (Last 24 hours) at 2021 1508  Last data filed at 2021 1005  Gross per 24 hour   Intake 580 ml   Output 600 ml   Net -20 ml       General: Alert, awake, in NAD   Cardiovascular:  RRR, no murmurs  Pulmonary:  CTAB  GI:  Soft, nt, nd  Extremities: No edema   Additional:      Labs:    Recent Results (from the past 24 hour(s))   CBC WITH AUTOMATED DIFF    Collection Time: 21  3:16 PM   Result Value Ref Range    WBC 11.4 4.6 - 13.2 K/uL    RBC 4.66 4.20 - 5.30 M/uL    HGB 12.7 12.0 - 16.0 g/dL    HCT 38.5 35.0 - 45.0 %    MCV 82.6 74.0 - 97.0 FL    MCH 27.3 24.0 - 34.0 PG    MCHC 33.0 31.0 - 37.0 g/dL    RDW 12.7 11.6 - 14.5 %    PLATELET 110 237 - 544 K/uL    MPV 11.0 9.2 - 11.8 FL    NEUTROPHILS 52 40 - 73 %    LYMPHOCYTES 24 21 - 52 %    MONOCYTES 10 3 - 10 %    EOSINOPHILS 14 (H) 0 - 5 %    BASOPHILS 0 0 - 2 %    ABS. NEUTROPHILS 6.0 1.8 - 8.0 K/UL    ABS. LYMPHOCYTES 2.7 0.9 - 3.6 K/UL    ABS. MONOCYTES 1.1 0.05 - 1.2 K/UL    ABS. EOSINOPHILS 1.6 (H) 0.0 - 0.4 K/UL    ABS.  BASOPHILS 0.0 0.0 - 0.1 K/UL    DF MANUAL      PLATELET COMMENTS ADEQUATE PLATELETS      RBC COMMENTS NORMOCYTIC, NORMOCHROMIC     METABOLIC PANEL, BASIC    Collection Time: 05/18/21  3:16 PM   Result Value Ref Range    Sodium 141 136 - 145 mmol/L    Potassium 4.6 3.5 - 5.5 mmol/L    Chloride 103 100 - 111 mmol/L    CO2 30 21 - 32 mmol/L    Anion gap 8 3.0 - 18 mmol/L    Glucose 74 74 - 99 mg/dL    BUN 24 (H) 7.0 - 18 MG/DL    Creatinine 1.27 0.6 - 1.3 MG/DL    BUN/Creatinine ratio 19 12 - 20      GFR est AA 48 (L) >60 ml/min/1.73m2    GFR est non-AA 40 (L) >60 ml/min/1.73m2    Calcium 9.5 8.5 - 10.1 MG/DL   TROPONIN I    Collection Time: 05/18/21  3:16 PM   Result Value Ref Range    Troponin-I, QT <0.08 (H) 0.0 - 0.045 NG/ML   NT-PRO BNP    Collection Time: 05/18/21  3:16 PM   Result Value Ref Range    NT pro- 0 - 1,800 PG/ML   PROTHROMBIN TIME + INR    Collection Time: 05/18/21  3:16 PM   Result Value Ref Range    Prothrombin time 14.5 11.5 - 15.2 sec    INR 1.1 0.8 - 1.2     MAGNESIUM    Collection Time: 05/18/21  3:16 PM   Result Value Ref Range    Magnesium 2.1 1.6 - 2.6 mg/dL   PHOSPHORUS    Collection Time: 05/18/21  3:16 PM   Result Value Ref Range    Phosphorus 3.3 2.5 - 4.9 MG/DL   POC TROPONIN-I    Collection Time: 05/18/21  5:26 PM   Result Value Ref Range    Troponin-I (POC) <0.04 0.00 - 0.08 ng/mL   URINALYSIS W/ RFLX MICROSCOPIC    Collection Time: 05/18/21  5:52 PM   Result Value Ref Range    Color YELLOW      Appearance CLEAR      Specific gravity 1.006 1.005 - 1.030      pH (UA) 6.5 5.0 - 8.0      Protein Negative NEG mg/dL    Glucose Negative NEG mg/dL    Ketone Negative NEG mg/dL Bilirubin Negative NEG      Blood Negative NEG      Urobilinogen 0.2 0.2 - 1.0 EU/dL    Nitrites Negative NEG      Leukocyte Esterase SMALL (A) NEG     URINE MICROSCOPIC ONLY    Collection Time: 05/18/21  5:52 PM   Result Value Ref Range    WBC 0 to 3 0 - 4 /hpf    Epithelial cells 1+ 0 - 5 /lpf   COVID-19 RAPID TEST    Collection Time: 05/18/21  6:44 PM   Result Value Ref Range    Specimen source Nasopharyngeal      COVID-19 rapid test Not detected NOTD     TROPONIN I    Collection Time: 05/18/21  8:31 PM   Result Value Ref Range    Troponin-I, QT <0.02 0.0 - 0.045 NG/ML   POC TROPONIN-I    Collection Time: 05/18/21 10:10 PM   Result Value Ref Range    Troponin-I (POC) <0.04 0.00 - 0.08 ng/mL   GLUCOSE, POC    Collection Time: 05/19/21  7:38 AM   Result Value Ref Range    Glucose (POC) 157 (H) 70 - 110 mg/dL   CBC W/O DIFF    Collection Time: 05/19/21 10:10 AM   Result Value Ref Range    WBC 7.3 4.6 - 13.2 K/uL    RBC 4.66 4.20 - 5.30 M/uL    HGB 12.7 12.0 - 16.0 g/dL    HCT 39.4 35.0 - 45.0 %    MCV 84.5 74.0 - 97.0 FL    MCH 27.3 24.0 - 34.0 PG    MCHC 32.2 31.0 - 37.0 g/dL    RDW 12.6 11.6 - 14.5 %    PLATELET 930 148 - 023 K/uL    MPV 10.9 9.2 - 11.8 FL   HEMOGLOBIN A1C WITH EAG    Collection Time: 05/19/21 10:10 AM   Result Value Ref Range    Hemoglobin A1c 6.3 (H) 4.2 - 5.6 %    Est. average glucose 134 mg/dL   GLUCOSE, POC    Collection Time: 05/19/21 11:31 AM   Result Value Ref Range    Glucose (POC) 261 (H) 70 - 110 mg/dL     Additional Data Reviewed:      Signed By: Abida Ashton MD     May 19, 2021 3:08 PM

## 2021-05-19 NOTE — ROUTINE PROCESS
Bedside and Verbal shift change report given to LAURA chi (oncoming nurse) by Michael Pierce RN (offgoing nurse). Report included the following information SBAR, Kardex and MAR.

## 2021-05-19 NOTE — PROGRESS NOTES
Left a message for pt's daughter Umberto Kirby. Reason for Admission:  COPD with acute exacerbation ([facility] [address] [J44.1]  Hypoxia [R09.02]                 RUR Score:    17           Plan for utilizing home health: To be determined                      Likelihood of Readmission:   LOW                         Transition of Care Plan:              Initial assessment completed with patient. Cognitive status of patient: oriented to time, place, person and situation. Face sheet information confirmed:  yes. The patient designates her daughter Umberto Kirby to participate in her discharge plan and to receive any needed information. This patient lives in a home with daughter. Patient is able to navigate steps as needed. Prior to hospitalization, patient was considered to be independent with ADLs/IADLS : yes . Patient has a current ACP document on file: no.  Per pt, she will need her daughter's assistance with ACP documentation      Healthcare Decision Maker:     Click here to complete [employer] including selection of the Healthcare Decision Maker Relationship (ie \"Primary\")    The daughter will be available to transport patient home upon discharge. The patient already has nebulizer medical equipment available in the home. Patient is not currently active with home health. Patient has not stayed in a skilled nursing facility or rehab. Was  stay within last 60 days : no. This patient is on dialysis :no.     Currently, the discharge plan is home health vs pulmonary rehab    The patient states that she can obtain her medications from the pharmacy, and take her medications as directed. Patient's current insurance is [facility]       Care Management Interventions  PCP Verified by CM: Yes  Palliative Care Criteria Met (RRAT>21 & CHF Dx)?: No  Mode of Transport at Discharge:  Other (see comment) (family)  Transition of Care Consult (CM Consult): Discharge Planning  Physical Therapy Consult: Yes  Occupational Therapy Consult: Yes  Speech Therapy Consult: No  Current Support Network: Relative's Home (lives with her daughter Rosa Joyner)  Confirm Follow Up Transport: Family  Discharge Location  Discharge Placement: Home with family assistance        AMAYA Harris RN  Care Management  Pager: [phone]

## 2021-05-19 NOTE — PROGRESS NOTES
Problem: Mobility Impaired (Adult and Pediatric)  Goal: *Acute Goals and Plan of Care (Insert Text)  Description: Physical Therapy Goals  Initiated 5/19/2021 and to be accomplished within 7 day(s)  1. Patient will move from supine to sit and sit to supine  in bed with modified independence. 2.  Patient will transfer from bed to chair and chair to bed with modified independence using the least restrictive device. 3.  Patient will perform sit to stand with modified independence. 4.  Patient will ambulate with modified independence for 150 feet with the least restrictive device. 5.  Patient will ascend/descend 10 stairs with handrail(s) with supervision. PLOF: Independent    Outcome: Progressing Towards Goal     PHYSICAL THERAPY EVALUATION    Patient: Jyoti Hammonds (59 y.o. female)  Date: 5/19/2021  Primary Diagnosis: COPD with acute exacerbation ([facility] [J44.1]  Hypoxia [R09.02]  Precautions: Skin  ASSESSMENT :  Seated in recliner. Oriented to self, place, and month. Talkative; poor historian of home set-up. Frequent cues to remain on task. Lives with daughter and amb independent. Stairs in home; unclear if patient has to complete daily. Supervision for all mobility d/t safety awareness and distractibility. Amb 30ft with supervision; limited by lines. Picks up multiple items from floor with supervision. Attempted alternating toe taps to 4 inch block to simulate stairs. Able to complete 2 reps; limited by patient unable to understanding tasks of tapping v. crushing box despite verbal and visual demonstration. Returned to seated in chair. Educated on need for RN assistance with mobility; verbalized understanding. Call bell in reach. Chair alarm on. Encourage OOB/EOB for meals and OOB for toileting to prevent deconditioning during admission. Patient will benefit from skilled intervention to address the above impairments.   Patient's rehabilitation potential is considered to be Good  Factors which may influence rehabilitation potential include:   []         None noted  []         Mental ability/status  [x]         Medical condition  []         Home/family situation and support systems  []         Safety awareness  []         Pain tolerance/management  []         Other:      PLAN :  Recommendations and Planned Interventions:   [x]           Bed Mobility Training             [x]    Neuromuscular Re-Education  [x]           Transfer Training                   []    Orthotic/Prosthetic Training  [x]           Gait Training                          []    Modalities  [x]           Therapeutic Exercises           []    Edema Management/Control  [x]           Therapeutic Activities            [x]    Family Training/Education  [x]           Patient Education  []           Other (comment):    Frequency/Duration: Patient will be followed by physical therapy 1-2 times per day/4-7 days per week to address goals. Discharge Recommendations: Home Health  Further Equipment Recommendations for Discharge: None     SUBJECTIVE:   Patient stated I talk a lot.     OBJECTIVE DATA SUMMARY:     Past Medical History:   Diagnosis Date    Bronchitis     Diabetes ([facility]     Hyperlipemia     Hypertension     Pneumonia Jun-Jul 2019     Past Surgical History:   Procedure Laterality Date    COLONOSCOPY N/A 6/7/2017    COLONOSCOPY / polypectomy performed by Leighton Gonzales MD at [facility] ENDOSCOPY    HX CATARACT REMOVAL      HX COLONOSCOPY      2011     Barriers to Learning/Limitations: yes;  cognitive and altered mental status (i.e.Sedation, Confusion)  Compensate with: Visual Cues, Verbal Cues, Tactile Cues and Kinesthetic Cues    Home Situation:  Home Situation  Home Environment: Private residence  One/Two Story Residence: Two story (states she lives mostly upstairs)  Lift Chair Available: No  Living Alone: No  Support Systems: Child(wiley), [employer], Friends \ neighbors  Patient Expects to be Discharged to[employer] residence  Current DME Used/Available at Home: Tommas rlich or Shower Type: Tub/Shower combination    Critical Behavior:  Neurologic State: Alert  Orientation Level: Oriented to person;Oriented to place  Cognition: Follows commands  Safety/Judgement: Fall prevention; Awareness of environment  Psychosocial  Patient Behaviors: Talkative    Strength:    Manual Muscle Testing (LE)         R     L    Hip Flexion:   4+/5  4+/5  Knee EXT:   4+/5  4+/5  Knee FLEX:   4+/5  4+/5  Ankle DF:   4+/5  4+/5  _________________________________________________   Range Of Motion:  BLE AROM WFL  Functional Mobility:  Transfers:  Sit to Stand: Supervision  Stand to Sit: Supervision  Balance:   Sitting: Intact  Standing: Impaired  Standing - Static: Good  Standing - Dynamic : Good  Ambulation/Gait Training:  Distance (ft): 30 Feet (ft)   Ambulation - Level of Assistance: Supervision  Neuro Re-education:  Seated balance 15 minutes  Therapeutic Exercises:   Sit to stand x2  Alternating toe taps x2 BLE to 4 inch box  Item retrieval from ground x2    Pain:  Pain level pre-treatment: 0/10   Pain level post-treatment: 0/10     Activity Tolerance:   Good    After treatment:   [x]         Patient left in no apparent distress sitting up in chair  []         Patient left in no apparent distress in bed  [x]         Call bell left within reach  [x]         Nursing notified  []         Caregiver present  [x]         Chair alarm activated  []         SCDs applied    COMMUNICATION/EDUCATION:   [x]         Role of physical therapy and plan of care in the acute care setting. [x]         Fall prevention education was provided and the patient/caregiver indicated understanding. [x]         Patient/family have participated as able in goal setting and plan of care. []         Patient/family agree to work toward stated goals and plan of care. []         Patient understands intent and goals of therapy, but is neutral about his/her participation.   [] Patient is unable to participate in goal setting/plan of care: ongoing with therapy staff.     Thank you for this referral.  Roseline Madrigal, PT   Time Calculation: 20 mins    Eval Complexity: History: MEDIUM  Complexity : 1-2 comorbidities / personal factors will impact the outcome/ POC Exam:MEDIUM Complexity : 3 Standardized tests and measures addressing body structure, function, activity limitation and / or participation in recreation  Presentation: MEDIUM Complexity : Evolving with changing characteristics  Clinical Decision Making:Medium Complexity    Clinical judgement; ROM, MMT, functional mobility Overall Complexity:MEDIUM

## 2021-05-20 ENCOUNTER — APPOINTMENT (OUTPATIENT)
Dept: NON INVASIVE DIAGNOSTICS | Age: 86
DRG: 190 | End: 2021-05-20
Attending: INTERNAL MEDICINE
Payer: MEDICARE

## 2021-05-20 ENCOUNTER — APPOINTMENT (OUTPATIENT)
Dept: CT IMAGING | Age: 86
DRG: 190 | End: 2021-05-20
Attending: INTERNAL MEDICINE
Payer: MEDICARE

## 2021-05-20 ENCOUNTER — APPOINTMENT (OUTPATIENT)
Dept: VASCULAR SURGERY | Age: 86
DRG: 190 | End: 2021-05-20
Attending: INTERNAL MEDICINE
Payer: MEDICARE

## 2021-05-20 LAB
GLUCOSE BLD STRIP.AUTO-MCNC: 108 MG/DL (ref 70–110)
GLUCOSE BLD STRIP.AUTO-MCNC: 136 MG/DL (ref 70–110)
GLUCOSE BLD STRIP.AUTO-MCNC: 143 MG/DL (ref 70–110)
GLUCOSE BLD STRIP.AUTO-MCNC: 154 MG/DL (ref 70–110)

## 2021-05-20 PROCEDURE — 74011250637 HC RX REV CODE- 250/637: Performed by: INTERNAL MEDICINE

## 2021-05-20 PROCEDURE — 82962 GLUCOSE BLOOD TEST: CPT

## 2021-05-20 PROCEDURE — 65270000029 HC RM PRIVATE

## 2021-05-20 PROCEDURE — 94761 N-INVAS EAR/PLS OXIMETRY MLT: CPT

## 2021-05-20 PROCEDURE — 74011250636 HC RX REV CODE- 250/636: Performed by: INTERNAL MEDICINE

## 2021-05-20 PROCEDURE — 94640 AIRWAY INHALATION TREATMENT: CPT

## 2021-05-20 PROCEDURE — 99232 SBSQ HOSP IP/OBS MODERATE 35: CPT | Performed by: INTERNAL MEDICINE

## 2021-05-20 PROCEDURE — 93970 EXTREMITY STUDY: CPT

## 2021-05-20 PROCEDURE — 77010033678 HC OXYGEN DAILY

## 2021-05-20 PROCEDURE — 74011000250 HC RX REV CODE- 250: Performed by: INTERNAL MEDICINE

## 2021-05-20 PROCEDURE — 70450 CT HEAD/BRAIN W/O DYE: CPT

## 2021-05-20 PROCEDURE — 2709999900 HC NON-CHARGEABLE SUPPLY

## 2021-05-20 RX ORDER — VERAPAMIL HYDROCHLORIDE 180 MG/1
180 TABLET, EXTENDED RELEASE ORAL
Status: DISCONTINUED | OUTPATIENT
Start: 2021-05-21 | End: 2021-05-22 | Stop reason: HOSPADM

## 2021-05-20 RX ORDER — HALOPERIDOL 5 MG/ML
2.5 INJECTION INTRAMUSCULAR
Status: COMPLETED | OUTPATIENT
Start: 2021-05-20 | End: 2021-05-20

## 2021-05-20 RX ORDER — CLONIDINE HYDROCHLORIDE 0.1 MG/1
0.2 TABLET ORAL 2 TIMES DAILY
Status: DISCONTINUED | OUTPATIENT
Start: 2021-05-20 | End: 2021-05-20

## 2021-05-20 RX ORDER — AZITHROMYCIN 500 MG/1
500 TABLET, FILM COATED ORAL DAILY
Qty: 3 TABLET | Refills: 0 | Status: SHIPPED | OUTPATIENT
Start: 2021-05-20 | End: 2021-05-22

## 2021-05-20 RX ORDER — VERAPAMIL HYDROCHLORIDE 180 MG/1
180 TABLET, EXTENDED RELEASE ORAL
Status: DISCONTINUED | OUTPATIENT
Start: 2021-05-20 | End: 2021-05-20

## 2021-05-20 RX ORDER — CLONIDINE HYDROCHLORIDE 0.1 MG/1
0.2 TABLET ORAL EVERY 12 HOURS
Status: DISCONTINUED | OUTPATIENT
Start: 2021-05-20 | End: 2021-05-22 | Stop reason: HOSPADM

## 2021-05-20 RX ORDER — PRAVASTATIN SODIUM 20 MG/1
40 TABLET ORAL
Status: DISCONTINUED | OUTPATIENT
Start: 2021-05-20 | End: 2021-05-22 | Stop reason: HOSPADM

## 2021-05-20 RX ORDER — IPRATROPIUM BROMIDE AND ALBUTEROL SULFATE 2.5; .5 MG/3ML; MG/3ML
3 SOLUTION RESPIRATORY (INHALATION)
Qty: 30 NEBULE | Refills: 1 | Status: SHIPPED | OUTPATIENT
Start: 2021-05-20 | End: 2022-01-06 | Stop reason: SDUPTHER

## 2021-05-20 RX ORDER — LORAZEPAM 2 MG/ML
1 INJECTION INTRAMUSCULAR ONCE
Status: COMPLETED | OUTPATIENT
Start: 2021-05-20 | End: 2021-05-20

## 2021-05-20 RX ADMIN — GUAIFENESIN 600 MG: 600 TABLET, EXTENDED RELEASE ORAL at 22:25

## 2021-05-20 RX ADMIN — ASPIRIN 81 MG: 81 TABLET, CHEWABLE ORAL at 11:14

## 2021-05-20 RX ADMIN — METHYLPREDNISOLONE SODIUM SUCCINATE 40 MG: 40 INJECTION, POWDER, FOR SOLUTION INTRAMUSCULAR; INTRAVENOUS at 07:52

## 2021-05-20 RX ADMIN — Medication 10 ML: at 22:26

## 2021-05-20 RX ADMIN — IPRATROPIUM BROMIDE AND ALBUTEROL SULFATE 3 ML: .5; 3 SOLUTION RESPIRATORY (INHALATION) at 02:00

## 2021-05-20 RX ADMIN — PRAVASTATIN SODIUM 40 MG: 20 TABLET ORAL at 22:25

## 2021-05-20 RX ADMIN — PROMETHAZINE HYDROCHLORIDE 12.5 MG: 12.5 TABLET ORAL at 01:00

## 2021-05-20 RX ADMIN — IPRATROPIUM BROMIDE AND ALBUTEROL SULFATE 3 ML: .5; 3 SOLUTION RESPIRATORY (INHALATION) at 07:37

## 2021-05-20 RX ADMIN — LORAZEPAM 1 MG: 2 INJECTION, SOLUTION INTRAMUSCULAR; INTRAVENOUS at 06:00

## 2021-05-20 RX ADMIN — AZITHROMYCIN 500 MG: 500 INJECTION, POWDER, LYOPHILIZED, FOR SOLUTION INTRAVENOUS at 06:23

## 2021-05-20 RX ADMIN — ENOXAPARIN SODIUM 40 MG: 40 INJECTION SUBCUTANEOUS at 11:13

## 2021-05-20 RX ADMIN — BUDESONIDE 500 MCG: 0.5 SUSPENSION RESPIRATORY (INHALATION) at 07:37

## 2021-05-20 RX ADMIN — FAMOTIDINE 20 MG: 20 TABLET ORAL at 11:14

## 2021-05-20 RX ADMIN — LISINOPRIL 40 MG: 40 TABLET ORAL at 11:17

## 2021-05-20 RX ADMIN — CLONIDINE HYDROCHLORIDE 0.2 MG: 0.1 TABLET ORAL at 11:14

## 2021-05-20 RX ADMIN — IPRATROPIUM BROMIDE AND ALBUTEROL SULFATE 3 ML: .5; 3 SOLUTION RESPIRATORY (INHALATION) at 15:33

## 2021-05-20 RX ADMIN — HALOPERIDOL LACTATE 2.5 MG: 5 INJECTION, SOLUTION INTRAMUSCULAR at 11:25

## 2021-05-20 RX ADMIN — METOPROLOL SUCCINATE 200 MG: 50 TABLET, EXTENDED RELEASE ORAL at 11:14

## 2021-05-20 RX ADMIN — Medication 10 ML: at 07:53

## 2021-05-20 RX ADMIN — MONTELUKAST 10 MG: 10 TABLET, FILM COATED ORAL at 22:25

## 2021-05-20 RX ADMIN — GUAIFENESIN 600 MG: 600 TABLET, EXTENDED RELEASE ORAL at 11:24

## 2021-05-20 RX ADMIN — CLONIDINE HYDROCHLORIDE 0.2 MG: 0.1 TABLET ORAL at 22:25

## 2021-05-20 RX ADMIN — ACETAMINOPHEN 650 MG: 325 TABLET ORAL at 01:00

## 2021-05-20 NOTE — PROGRESS NOTES
Pt sundowning during night. Able to manage confusion/activity earlier in shift. At present Pt is physically aggressive. Not following commands. Resisting reorientation and redirection. Code [place] initiated for patient and staff safety. Behavior escalated. Nursing supervisors, security present. Daughter called and voice message left. Provider notified, order taken and ativan 1 mg IV given. 0630-Pt sleeping. Bed alarm on.  Frequent checks by staff

## 2021-05-20 NOTE — PROGRESS NOTES
Report  Given from night shift. Patient has episode of confusion, agitation and being combative. Patient was resting  0800 am . Patient taken to test. Reported that patient was trying to climb out of the the bed. Patient was combative stating that she needs to use the rest room. Over one hour, patient being combative and trying to put on gloves believing that they were socks. Patient was assisted into the bed call Dr. Solomon Cope, while on the phone patient was found on the floor. Vitals where taken. Patient continued to refused to sit became combative with physical therapists and nurse aide. Patient given haldol 2.5 mg IV have staff at bedside for safety. Will continue to monitor.

## 2021-05-20 NOTE — PROGRESS NOTES
Problem: Falls - Risk of  Goal: *Absence of Falls  Description: Document Devere Verbank Fall Risk and appropriate interventions in the flowsheet. Outcome: Progressing Towards Goal  Note: Fall Risk Interventions:  Mobility Interventions: Bed/chair exit alarm         Medication Interventions: Bed/chair exit alarm         History of Falls Interventions: Door open when patient unattended, Room close to nurse's station         Problem: Patient Education: Go to Patient Education Activity  Goal: Patient/Family Education  Outcome: Progressing Towards Goal     Problem: Gas Exchange - Impaired  Goal: *Absence of hypoxia  Outcome: Progressing Towards Goal     Problem: Patient Education: Go to Patient Education Activity  Goal: Patient/Family Education  Outcome: Progressing Towards Goal     Problem: Diabetes Self-Management  Goal: *Disease process and treatment process  Description: Define diabetes and identify own type of diabetes; list 3 options for treating diabetes. Outcome: Progressing Towards Goal  Goal: *Incorporating nutritional management into lifestyle  Description: Describe effect of type, amount and timing of food on blood glucose; list 3 methods for planning meals. Outcome: Progressing Towards Goal  Goal: *Incorporating physical activity into lifestyle  Description: State effect of exercise on blood glucose levels. Outcome: Progressing Towards Goal  Goal: *Developing strategies to promote health/change behavior  Description: Define the ABC's of diabetes; identify appropriate screenings, schedule and personal plan for screenings. Outcome: Progressing Towards Goal  Goal: *Using medications safely  Description: State effect of diabetes medications on diabetes; name diabetes medication taking, action and side effects. Outcome: Progressing Towards Goal  Goal: *Monitoring blood glucose, interpreting and using results  Description: Identify recommended blood glucose targets  and personal targets.   Outcome: Progressing Towards Goal  Goal: *Prevention, detection, treatment of acute complications  Description: List symptoms of hyper- and hypoglycemia; describe how to treat low blood sugar and actions for lowering  high blood glucose level. Outcome: Progressing Towards Goal  Goal: *Prevention, detection and treatment of chronic complications  Description: Define the natural course of diabetes and describe the relationship of blood glucose levels to long term complications of diabetes. Outcome: Progressing Towards Goal  Goal: *Developing strategies to address psychosocial issues  Description: Describe feelings about living with diabetes; identify support needed and support network  Outcome: Progressing Towards Goal  Goal: *Insulin pump training  Outcome: Progressing Towards Goal  Goal: *Sick day guidelines  Outcome: Progressing Towards Goal  Goal: *Patient Specific Goal (EDIT GOAL, INSERT TEXT)  Outcome: Progressing Towards Goal     Problem: Patient Education: Go to Patient Education Activity  Goal: Patient/Family Education  Outcome: Progressing Towards Goal     Problem: Pressure Injury - Risk of  Goal: *Prevention of pressure injury  Description: Document Amauri Scale and appropriate interventions in the flowsheet.   Outcome: Progressing Towards Goal  Note: Pressure Injury Interventions:       Moisture Interventions: Internal/External urinary devices, Absorbent underpads, Minimize layers    Activity Interventions: PT/OT evaluation, Pressure redistribution bed/mattress(bed type), Increase time out of bed    Mobility Interventions: Pressure redistribution bed/mattress (bed type), PT/OT evaluation    Nutrition Interventions: Document food/fluid/supplement intake                     Problem: Patient Education: Go to Patient Education Activity  Goal: Patient/Family Education  Outcome: Progressing Towards Goal     Problem: Patient Education: Go to Patient Education Activity  Goal: Patient/Family Education  Outcome: Progressing Towards Goal     Problem: Patient Education: Go to Patient Education Activity  Goal: Patient/Family Education  Outcome: Progressing Towards Goal

## 2021-05-20 NOTE — PROGRESS NOTES
1136: Attempted PT session. Patient restless and attempting OOB; CNA present. Patient not following commands for safety. Staff request PT follow up.   [phone]: 2nd PT attempt. Discharge orders in chart. Family and staff at bedside. PT discharge recs to home with 24/7 supervision. Will follow up as indicated.

## 2021-05-20 NOTE — DIABETES MGMT
GLYCEMIC CONTROL PLAN OF CARE     Assessment:  Pt admitted with COPD exacerbation and PMHx including T2DM (oral medications PTA), HTN, HLD. Current A1C indicates good glycemic control PTA. Last 4 out of 5 blood glucose readings in target range. Recommendations  1. Suggest continuing current corrective lispro, normal insulin sensitivity ACHS. 2. Will monitor prn. Most recent blood glucose values:  5/20:  108  5/19:  157, 261, 138, 154        Current A1C of 6.2 % is equivalent to average blood glucose of 131 mg/dl over the past 2-3 months. Current hospital diabetes medications: corrective lispro, normal insulin sensitivity ACHS    Previous day's insulin requirements: 12 units (corrective lispro)    Home diabetes medications: per chart review -  Key Antihyperglycemic Medications             glimepiride (AMARYL) 4 mg tablet (Taking) Take 2 mg by mouth daily.             Diet:  DIET DIABETIC CONSISTENT CARB   Meal Intake:   Patient Vitals for the past 168 hrs:   % Diet Eaten   05/19/21 1743 76 - 100%   05/19/21 1005 76 - 100%       Education:  ____Refer to Diabetes Education Record             __x__Education not indicated at this time      Fredrick Merchant, [address] [place] [address], [address] [place] [address]  Diabetes and Glycemic Control   Office:  [phone]  Pager:  [phone]

## 2021-05-20 NOTE — PROGRESS NOTES
Met with pt's daughter Negra Carson yesterday. She was agreeable to ptot recommendations of home with home health and agreeable to any home health agency that accepts. 1354: Home health orders sent to [facility] in [place]. [facility] declined. [facility] and spoke with Fabricio Munson of [address]. She stated they will accept pt.             Be Buenrostro, NANCYN RN  Care Management  Pager: [phone]

## 2021-05-20 NOTE — PROGRESS NOTES
Holding PT treatment d/t pending BLE duplex. Will follow up post imaging to maximize patient safety and participation in PT treatment.

## 2021-05-20 NOTE — PROGRESS NOTES
conducted an initial consultation and Spiritual Assessment for [facility], who is a 80 y.o.,female. Patient's Primary Language is: [place]. According to the patient's EMR Hinduism Affiliation is: [facility].     The reason the Patient came to the hospital is:   Patient Active Problem List    Diagnosis Date Noted    [facility] 05/18/2021    Respiratory failure ([facility] 04/17/2020    Suspected COVID-19 virus infection 04/13/2020    COPD exacerbation ([facility] 04/12/2020    Hypoxia 06/30/2019    Controlled type 2 diabetes mellitus, without long-term current use of insulin ([facility] 06/30/2019    Essential hypertension 06/30/2019        The  provided the following Interventions:   was unable to assess patient who was asleep. Plan:  Chaplains will continue to follow and will provide pastoral care on an as needed/requested basis.  recommends bedside caregivers page  on duty if patient shows signs of acute spiritual or emotional distress.     [facility]   [phone]

## 2021-05-20 NOTE — PROGRESS NOTES
Problem: Falls - Risk of  Goal: *Absence of Falls  Description: Document Arnoldo Stone Fall Risk and appropriate interventions in the flowsheet.   5/20/2021 1228 by Mirela Lin RN  Outcome: Resolved/Met  5/20/2021 1228 by Mirela Lin RN  Outcome: Not Progressing Towards Goal  Note: Fall Risk Interventions:  Mobility Interventions: Bed/chair exit alarm, PT Consult for mobility concerns, Utilize walker, cane, or other assistive device         Medication Interventions: Patient to call before getting OOB, Teach patient to arise slowly, Bed/chair exit alarm    Elimination Interventions: Bed/chair exit alarm, Call light in reach, Patient to call for help with toileting needs, Stay With Me (per policy)    History of Falls Interventions: Door open when patient unattended, Bed/chair exit alarm      5/20/2021 1213 by Mirela Lin RN  Outcome: Not Progressing Towards Goal  Note: Fall Risk Interventions:  Mobility Interventions: Bed/chair exit alarm, PT Consult for mobility concerns, Utilize walker, cane, or other assistive device         Medication Interventions: Patient to call before getting OOB, Teach patient to arise slowly, Bed/chair exit alarm    Elimination Interventions: Bed/chair exit alarm, Call light in reach, Patient to call for help with toileting needs, Stay With Me (per policy)    History of Falls Interventions: Door open when patient unattended, Bed/chair exit alarm         Problem: Patient Education: Go to Patient Education Activity  Goal: Patient/Family Education  Outcome: Resolved/Met     Problem: Gas Exchange - Impaired  Goal: *Absence of hypoxia  5/20/2021 1228 by Mirela Lin RN  Outcome: Resolved/Met  5/20/2021 1228 by Mirela Lin RN  Outcome: Progressing Towards Goal  5/20/2021 1213 by Mirela Lin RN  Outcome: Progressing Towards Goal     Problem: Patient Education: Go to Patient Education Activity  Goal: Patient/Family Education  Outcome: Resolved/Met     Problem: Diabetes Self-Management  Goal: *Disease process and treatment process  Description: Define diabetes and identify own type of diabetes; list 3 options for treating diabetes. 5/20/2021 1228 by Joshua Marion RN  Outcome: Resolved/Met  5/20/2021 1213 by Joshua Marion RN  Outcome: Progressing Towards Goal  Goal: *Incorporating nutritional management into lifestyle  Description: Describe effect of type, amount and timing of food on blood glucose; list 3 methods for planning meals. 5/20/2021 1228 by Joshua Marion RN  Outcome: Resolved/Met  5/20/2021 1213 by Joshua Marion RN  Outcome: Progressing Towards Goal  Goal: *Incorporating physical activity into lifestyle  Description: State effect of exercise on blood glucose levels. 5/20/2021 1228 by Joshua Marion RN  Outcome: Resolved/Met  5/20/2021 1213 by Joshua Marion RN  Outcome: Progressing Towards Goal  Goal: *Developing strategies to promote health/change behavior  Description: Define the ABC's of diabetes; identify appropriate screenings, schedule and personal plan for screenings. 5/20/2021 1228 by Joshua Marion RN  Outcome: Resolved/Met  5/20/2021 1213 by Joshua Marion RN  Outcome: Progressing Towards Goal  Goal: *Using medications safely  Description: State effect of diabetes medications on diabetes; name diabetes medication taking, action and side effects. Outcome: Resolved/Met  Goal: *Monitoring blood glucose, interpreting and using results  Description: Identify recommended blood glucose targets  and personal targets. Outcome: Resolved/Met  Goal: *Prevention, detection, treatment of acute complications  Description: List symptoms of hyper- and hypoglycemia; describe how to treat low blood sugar and actions for lowering  high blood glucose level.   Outcome: Resolved/Met  Goal: *Prevention, detection and treatment of chronic complications  Description: Define the natural course of diabetes and describe the relationship of blood glucose levels to long term complications of diabetes. Outcome: Resolved/Met  Goal: *Developing strategies to address psychosocial issues  Description: Describe feelings about living with diabetes; identify support needed and support network  Outcome: Resolved/Met  Goal: *Insulin pump training  Outcome: Resolved/Met  Goal: *Sick day guidelines  Outcome: Resolved/Met  Goal: *Patient Specific Goal (EDIT GOAL, INSERT TEXT)  Outcome: Resolved/Met     Problem: Patient Education: Go to Patient Education Activity  Goal: Patient/Family Education  Outcome: Resolved/Met     Problem: Pressure Injury - Risk of  Goal: *Prevention of pressure injury  Description: Document Amauri Scale and appropriate interventions in the flowsheet.   5/20/2021 1228 by Annette Das RN  Outcome: Resolved/Met  5/20/2021 1213 by Annette Das RN  Outcome: Progressing Towards Goal  Note: Pressure Injury Interventions:       Moisture Interventions: Internal/External fecal devices    Activity Interventions: Increase time out of bed, Pressure redistribution bed/mattress(bed type)    Mobility Interventions: Pressure redistribution bed/mattress (bed type)    Nutrition Interventions: Document food/fluid/supplement intake                     Problem: Patient Education: Go to Patient Education Activity  Goal: Patient/Family Education  Outcome: Resolved/Met     Problem: Patient Education: Go to Patient Education Activity  Goal: Patient/Family Education  Outcome: Resolved/Met     Problem: Patient Education: Go to Patient Education Activity  Goal: Patient/Family Education  Outcome: Resolved/Met

## 2021-05-20 NOTE — DISCHARGE SUMMARY
[facility]  Discharge Summary       Patient: Maida Long Age: 80 y.o. : 1934 MR#: [id number] [id number]: [id number]  PCP on record: Luz Maria Quiñones MD  Admit date: 2021  Discharge date: 2021    Consults:    -   Procedures:  -     Significant Diagnostic Studies:   -    Discharge Diagnoses:                                           Patient Active Problem List   Diagnosis Code    Hypoxia R09.02    Controlled type 2 diabetes mellitus, without long-term current use of insulin ([address] E11.9    Essential hypertension I10    COPD exacerbation ([address] J44.1    Suspected COVID-19 virus infection Z20.822    Respiratory failure ([address] J96.90    COPD with acute exacerbation ([address] J44.1       Hospital Course by Problem   1. Today's examination of the patient revealed:     Subjective:     Objective:   VS:   Visit Vitals  BP (!) 179/91 (BP 1 Location: Left upper arm, BP Patient Position: At rest)   Pulse 94   Temp 97.3 °F (36.3 °C)   Resp 20   SpO2 96%   Breastfeeding Unknown      Tmax/24hrs: Temp (24hrs), Av.9 °F (36.6 °C), Min:97.3 °F (36.3 °C), Max:98.6 °F (37 °C)     Input/Output:     Intake/Output Summary (Last 24 hours) at 2021 1207  Last data filed at 2021 8248  Gross per 24 hour   Intake 240 ml   Output 750 ml   Net -510 ml       General:    Cardiovascular:    Pulmonary:    GI:    Extremities:     Additional:      Labs:    Recent Results (from the past 24 hour(s))   GLUCOSE, POC    Collection Time: 21  4:10 PM   Result Value Ref Range    Glucose (POC) 138 (H) 70 - 110 mg/dL   D DIMER    Collection Time: 21  5:59 PM   Result Value Ref Range    D DIMER 2.02 (H) <0.46 ug/ml(FEU)   HEMOGLOBIN A1C WITH EAG    Collection Time: 21  5:59 PM   Result Value Ref Range    Hemoglobin A1c 6.2 (H) 4.2 - 5.6 %    Est. average glucose 131 mg/dL   GLUCOSE, POC    Collection Time: 21  9:43 PM   Result Value Ref Range    Glucose (POC) 154 (H) 70 - 110 mg/dL   GLUCOSE, POC    Collection Time: 05/20/21  8:43 AM   Result Value Ref Range    Glucose (POC) 108 70 - 110 mg/dL   GLUCOSE, POC    Collection Time: 05/20/21 11:32 AM   Result Value Ref Range    Glucose (POC) 143 (H) 70 - 110 mg/dL     Additional Data Reviewed:     Condition:   Disposition:    []Home   []Home with Home Health   []SNF/NH   []Rehab   []Home with family   []Alternate Facility:____________________      Discharge Medications:     Current Discharge Medication List      START taking these medications    Details   !! albuterol-ipratropium (DUO-NEB) 2.5 mg-0.5 mg/3 ml nebu 3 mL by Nebulization route every four (4) hours as needed for Wheezing or Shortness of Breath. Qty: 30 Nebule, Refills: 1      azithromycin (ZITHROMAX) 500 mg tab Take 1 Tablet by mouth daily for 3 days. Qty: 3 Tablet, Refills: 0       !! - Potential duplicate medications found. Please discuss with provider. CONTINUE these medications which have NOT CHANGED    Details   albuterol (PROVENTIL HFA, VENTOLIN HFA, PROAIR HFA) 90 mcg/actuation inhaler Take 2 Puffs by inhalation every four (4) hours as needed for Wheezing or Shortness of Breath. Indications: chronic obstructive pulmonary disease  Qty: 1 Inhaler, Refills: 0      metoprolol succinate 200 mg CSpX Take 1 Tab by mouth daily. fluticasone propionate (FLONASE) 50 mcg/actuation nasal spray 2 spar each nostril Bid  Qty: 1 Bottle, Refills: 0      !! albuterol-ipratropium (DUO-NEB) 2.5 mg-0.5 mg/3 ml nebu 3 mL by Nebulization route every four (4) hours as needed for Wheezing. Indications: bronchi muscle spasm resulting from COPD  Qty: 30 Nebule, Refills: 2      sodium chloride (SALINE MIST) 0.65 % nasal squeeze bottle 0.1 mL by Both Nostrils route as needed for Congestion. Indications: stuffy nose  Qty: 15 mL, Refills: 0      guaiFENesin (MUCUS RELIEF ER) 1,200 mg Ta12 ER tablet Take 1,200 mg by mouth two (2) times a day. famotidine (PEPCID) 20 mg tablet Take 1 Tab by mouth daily. Qty: 60 Tab, Refills: 0      aspirin 81 mg chewable tablet Take 81 mg by mouth daily. CETIRIZINE HCL (CETIRIZINE PO) Take 10 mg by mouth nightly. NEBULIZER by Does Not Apply route. Cholecalciferol, Vitamin D3, (VITAMIN D3) 1,000 unit cap Take 1,000 Units by mouth daily. cloNIDine (CATAPRESS) 0.2 mg tablet Take 0.2 mg by mouth two (2) times a day. glimepiride (AMARYL) 4 mg tablet Take 2 mg by mouth daily. lisinopril (PRINIVIL, ZESTRIL) 20 mg tablet Take 40 mg by mouth daily. pravastatin (PRAVACHOL) 40 mg tablet Take 40 mg by mouth daily. verapamil SR (CALAN-SR) 180 mg CR tablet Take 180 mg by mouth daily. !! - Potential duplicate medications found. Please discuss with provider. Follow-up Appointments:   1. Your PCP: Imelda Biggs MD, within 7-10days  2.          Please follow-up on tests/labs that are still pendin.     >30 minutes spent coordinating this discharge (review instructions/follow-up, prescriptions, preparing report for sign off)    Signed:  Louis Black MD  2021  12:07 PM

## 2021-05-20 NOTE — PROGRESS NOTES
Problem: Gas Exchange - Impaired  Goal: *Absence of hypoxia  Outcome: Progressing Towards Goal     Problem: Diabetes Self-Management  Goal: *Disease process and treatment process  Description: Define diabetes and identify own type of diabetes; list 3 options for treating diabetes. Outcome: Progressing Towards Goal  Goal: *Incorporating nutritional management into lifestyle  Description: Describe effect of type, amount and timing of food on blood glucose; list 3 methods for planning meals. Outcome: Progressing Towards Goal  Goal: *Incorporating physical activity into lifestyle  Description: State effect of exercise on blood glucose levels. Outcome: Progressing Towards Goal  Goal: *Developing strategies to promote health/change behavior  Description: Define the ABC's of diabetes; identify appropriate screenings, schedule and personal plan for screenings. Outcome: Progressing Towards Goal     Problem: Pressure Injury - Risk of  Goal: *Prevention of pressure injury  Description: Document Amauri Scale and appropriate interventions in the flowsheet.   Outcome: Progressing Towards Goal  Note: Pressure Injury Interventions:       Moisture Interventions: Internal/External fecal devices    Activity Interventions: Increase time out of bed, Pressure redistribution bed/mattress(bed type)    Mobility Interventions: Pressure redistribution bed/mattress (bed type)    Nutrition Interventions: Document food/fluid/supplement intake

## 2021-05-21 ENCOUNTER — APPOINTMENT (OUTPATIENT)
Dept: NON INVASIVE DIAGNOSTICS | Age: 86
DRG: 190 | End: 2021-05-21
Attending: INTERNAL MEDICINE
Payer: MEDICARE

## 2021-05-21 LAB
ANION GAP SERPL CALC-SCNC: 4 MMOL/L (ref 3–18)
ANION GAP SERPL CALC-SCNC: 6 MMOL/L (ref 3–18)
APPEARANCE UR: CLEAR
BILIRUB UR QL: NEGATIVE
BUN SERPL-MCNC: 26 MG/DL (ref 7–18)
BUN SERPL-MCNC: 37 MG/DL (ref 7–18)
BUN/CREAT SERPL: 21 (ref 12–20)
BUN/CREAT SERPL: 33 (ref 12–20)
CALCIUM SERPL-MCNC: 9.2 MG/DL (ref 8.5–10.1)
CALCIUM SERPL-MCNC: 9.4 MG/DL (ref 8.5–10.1)
CHLORIDE SERPL-SCNC: 105 MMOL/L (ref 100–111)
CHLORIDE SERPL-SCNC: 108 MMOL/L (ref 100–111)
CO2 SERPL-SCNC: 27 MMOL/L (ref 21–32)
CO2 SERPL-SCNC: 29 MMOL/L (ref 21–32)
COLOR UR: YELLOW
CREAT SERPL-MCNC: 1.11 MG/DL (ref 0.6–1.3)
CREAT SERPL-MCNC: 1.23 MG/DL (ref 0.6–1.3)
ECHO AO ROOT DIAM: 2.83 CM
ECHO LA AREA 4C: 13.73 CM2
ECHO LA VOL 2C: 48.7 ML (ref 22–52)
ECHO LA VOL 4C: 26.04 ML (ref 22–52)
ECHO LA VOL BP: 39.01 ML (ref 22–52)
ECHO LA VOL/BSA BIPLANE: 21.92 ML/M2 (ref 16–28)
ECHO LA VOLUME INDEX A2C: 27.36 ML/M2 (ref 16–28)
ECHO LA VOLUME INDEX A4C: 14.63 ML/M2 (ref 16–28)
ECHO LV E' LATERAL VELOCITY: 7.84 CM/S
ECHO LV E' SEPTAL VELOCITY: 6.46 CM/S
ECHO LV INTERNAL DIMENSION DIASTOLIC: 3.63 CM (ref 3.9–5.3)
ECHO LV INTERNAL DIMENSION SYSTOLIC: 1.92 CM
ECHO LV IVSD: 1.22 CM (ref 0.6–0.9)
ECHO LV MASS 2D: 143.2 G (ref 67–162)
ECHO LV MASS INDEX 2D: 80.5 G/M2 (ref 43–95)
ECHO LV POSTERIOR WALL DIASTOLIC: 1.18 CM (ref 0.6–0.9)
ECHO LVOT CARDIAC OUTPUT: 8.16 LITER/MINUTE
ECHO LVOT DIAM: 2.04 CM
ECHO LVOT PEAK GRADIENT: 7.29 MMHG
ECHO LVOT PEAK VELOCITY: 134.98 CM/S
ECHO LVOT SV: 101.8 ML
ECHO LVOT VTI: 31.12 CM
ECHO MV A VELOCITY: 119.43 CM/S
ECHO MV E DECELERATION TIME (DT): 155.02 MS
ECHO MV E VELOCITY: 67.19 CM/S
ECHO MV E/A RATIO: 0.56
ECHO MV E/E' LATERAL: 8.57
ECHO MV E/E' RATIO (AVERAGED): 9.49
ECHO MV E/E' SEPTAL: 10.4
ECHO RV TAPSE: 2.21 CM (ref 1.5–2)
ERYTHROCYTE [DISTWIDTH] IN BLOOD BY AUTOMATED COUNT: 13 % (ref 11.6–14.5)
GLUCOSE BLD STRIP.AUTO-MCNC: 115 MG/DL (ref 70–110)
GLUCOSE BLD STRIP.AUTO-MCNC: 121 MG/DL (ref 70–110)
GLUCOSE BLD STRIP.AUTO-MCNC: 94 MG/DL (ref 70–110)
GLUCOSE BLD STRIP.AUTO-MCNC: 96 MG/DL (ref 70–110)
GLUCOSE SERPL-MCNC: 109 MG/DL (ref 74–99)
GLUCOSE SERPL-MCNC: 187 MG/DL (ref 74–99)
GLUCOSE UR STRIP.AUTO-MCNC: NEGATIVE MG/DL
HCT VFR BLD AUTO: 35.4 % (ref 35–45)
HGB BLD-MCNC: 11.6 G/DL (ref 12–16)
HGB UR QL STRIP: NEGATIVE
KETONES UR QL STRIP.AUTO: NEGATIVE MG/DL
LEUKOCYTE ESTERASE UR QL STRIP.AUTO: NEGATIVE
LVOT MG: 4.28 MMHG
MAGNESIUM SERPL-MCNC: 1.8 MG/DL (ref 1.6–2.6)
MAGNESIUM SERPL-MCNC: 1.9 MG/DL (ref 1.6–2.6)
MCH RBC QN AUTO: 27.2 PG (ref 24–34)
MCHC RBC AUTO-ENTMCNC: 32.8 G/DL (ref 31–37)
MCV RBC AUTO: 82.9 FL (ref 74–97)
NITRITE UR QL STRIP.AUTO: NEGATIVE
PH UR STRIP: 6.5 [PH] (ref 5–8)
PLATELET # BLD AUTO: 221 K/UL (ref 135–420)
PMV BLD AUTO: 10.8 FL (ref 9.2–11.8)
POTASSIUM SERPL-SCNC: 4 MMOL/L (ref 3.5–5.5)
POTASSIUM SERPL-SCNC: 4.1 MMOL/L (ref 3.5–5.5)
PROT UR STRIP-MCNC: NEGATIVE MG/DL
RBC # BLD AUTO: 4.27 M/UL (ref 4.2–5.3)
SODIUM SERPL-SCNC: 138 MMOL/L (ref 136–145)
SODIUM SERPL-SCNC: 141 MMOL/L (ref 136–145)
SP GR UR REFRACTOMETRY: 1.01 (ref 1–1.03)
UROBILINOGEN UR QL STRIP.AUTO: 0.2 EU/DL (ref 0.2–1)
WBC # BLD AUTO: 15.9 K/UL (ref 4.6–13.2)

## 2021-05-21 PROCEDURE — 74011250636 HC RX REV CODE- 250/636: Performed by: EMERGENCY MEDICINE

## 2021-05-21 PROCEDURE — 2709999900 HC NON-CHARGEABLE SUPPLY

## 2021-05-21 PROCEDURE — 94640 AIRWAY INHALATION TREATMENT: CPT

## 2021-05-21 PROCEDURE — 80048 BASIC METABOLIC PNL TOTAL CA: CPT

## 2021-05-21 PROCEDURE — 74011250636 HC RX REV CODE- 250/636: Performed by: INTERNAL MEDICINE

## 2021-05-21 PROCEDURE — 93306 TTE W/DOPPLER COMPLETE: CPT

## 2021-05-21 PROCEDURE — 74011000250 HC RX REV CODE- 250: Performed by: INTERNAL MEDICINE

## 2021-05-21 PROCEDURE — 74011250637 HC RX REV CODE- 250/637: Performed by: EMERGENCY MEDICINE

## 2021-05-21 PROCEDURE — 74011250637 HC RX REV CODE- 250/637: Performed by: INTERNAL MEDICINE

## 2021-05-21 PROCEDURE — 81003 URINALYSIS AUTO W/O SCOPE: CPT

## 2021-05-21 PROCEDURE — 36415 COLL VENOUS BLD VENIPUNCTURE: CPT

## 2021-05-21 PROCEDURE — 83735 ASSAY OF MAGNESIUM: CPT

## 2021-05-21 PROCEDURE — 82962 GLUCOSE BLOOD TEST: CPT

## 2021-05-21 PROCEDURE — 65270000029 HC RM PRIVATE

## 2021-05-21 PROCEDURE — 85027 COMPLETE CBC AUTOMATED: CPT

## 2021-05-21 PROCEDURE — 77030038269 HC DRN EXT URIN PURWCK BARD -A

## 2021-05-21 PROCEDURE — 99233 SBSQ HOSP IP/OBS HIGH 50: CPT | Performed by: FAMILY MEDICINE

## 2021-05-21 RX ORDER — HYDRALAZINE HYDROCHLORIDE 20 MG/ML
10 INJECTION INTRAMUSCULAR; INTRAVENOUS
Status: DISCONTINUED | OUTPATIENT
Start: 2021-05-21 | End: 2021-05-22 | Stop reason: HOSPADM

## 2021-05-21 RX ORDER — GUAIFENESIN 100 MG/5ML
100 SOLUTION ORAL
Status: DISCONTINUED | OUTPATIENT
Start: 2021-05-21 | End: 2021-05-22 | Stop reason: HOSPADM

## 2021-05-21 RX ADMIN — GUAIFENESIN 100 MG: 200 SOLUTION ORAL at 09:14

## 2021-05-21 RX ADMIN — AZITHROMYCIN 500 MG: 500 INJECTION, POWDER, LYOPHILIZED, FOR SOLUTION INTRAVENOUS at 05:50

## 2021-05-21 RX ADMIN — MONTELUKAST 10 MG: 10 TABLET, FILM COATED ORAL at 23:03

## 2021-05-21 RX ADMIN — LISINOPRIL 40 MG: 40 TABLET ORAL at 09:19

## 2021-05-21 RX ADMIN — HYDRALAZINE HYDROCHLORIDE 10 MG: 20 INJECTION INTRAMUSCULAR; INTRAVENOUS at 06:15

## 2021-05-21 RX ADMIN — ENOXAPARIN SODIUM 40 MG: 40 INJECTION SUBCUTANEOUS at 09:17

## 2021-05-21 RX ADMIN — CLONIDINE HYDROCHLORIDE 0.2 MG: 0.1 TABLET ORAL at 23:03

## 2021-05-21 RX ADMIN — IPRATROPIUM BROMIDE AND ALBUTEROL SULFATE 3 ML: .5; 3 SOLUTION RESPIRATORY (INHALATION) at 13:03

## 2021-05-21 RX ADMIN — Medication 10 ML: at 23:04

## 2021-05-21 RX ADMIN — Medication 10 ML: at 05:50

## 2021-05-21 RX ADMIN — ASPIRIN 81 MG: 81 TABLET, CHEWABLE ORAL at 09:18

## 2021-05-21 RX ADMIN — BUDESONIDE 500 MCG: 0.5 SUSPENSION RESPIRATORY (INHALATION) at 19:50

## 2021-05-21 RX ADMIN — GUAIFENESIN 100 MG: 200 SOLUTION ORAL at 03:47

## 2021-05-21 RX ADMIN — CLONIDINE HYDROCHLORIDE 0.2 MG: 0.1 TABLET ORAL at 09:21

## 2021-05-21 RX ADMIN — BUDESONIDE 500 MCG: 0.5 SUSPENSION RESPIRATORY (INHALATION) at 07:59

## 2021-05-21 RX ADMIN — PRAVASTATIN SODIUM 40 MG: 20 TABLET ORAL at 23:03

## 2021-05-21 RX ADMIN — IPRATROPIUM BROMIDE AND ALBUTEROL SULFATE 3 ML: .5; 3 SOLUTION RESPIRATORY (INHALATION) at 07:59

## 2021-05-21 RX ADMIN — FAMOTIDINE 20 MG: 20 TABLET ORAL at 09:19

## 2021-05-21 RX ADMIN — METOPROLOL SUCCINATE 200 MG: 50 TABLET, EXTENDED RELEASE ORAL at 09:19

## 2021-05-21 RX ADMIN — GUAIFENESIN 600 MG: 600 TABLET, EXTENDED RELEASE ORAL at 23:03

## 2021-05-21 RX ADMIN — GUAIFENESIN 600 MG: 600 TABLET, EXTENDED RELEASE ORAL at 09:18

## 2021-05-21 RX ADMIN — IPRATROPIUM BROMIDE AND ALBUTEROL SULFATE 3 ML: .5; 3 SOLUTION RESPIRATORY (INHALATION) at 19:50

## 2021-05-21 RX ADMIN — HYDRALAZINE HYDROCHLORIDE 10 MG: 20 INJECTION INTRAMUSCULAR; INTRAVENOUS at 00:40

## 2021-05-21 RX ADMIN — VERAPAMIL HYDROCHLORIDE 180 MG: 180 TABLET ORAL at 09:18

## 2021-05-21 NOTE — PROGRESS NOTES
[facility]  Progress Note    Patient: Jane Leone Age: 80 y.o. : 1934 MR#: [id number] SSN: [id number]  Date/Time: 2021    Subjective:     Pt agitated at times, found on floor while nurse in room and on phone. No evidence of injury. Assessment/Plan:   80year old female with h/o DM, COPD admitted after she presented from home with worsening shortness of breath. Noted to be hypoxic in the ED.    -Acute hypoxic respiratory failure, , rapid COVID negative, now titrated to RA and comfortable. D dimer elevated, but PVLS lower ext negative for DVT although left lower ext not optimal study.  -Acute COPD exacerbation  -Acute metabolic encephalopathy, per daughter pt does not have dx of dementia and was not confused at time of presentation. Likely due to sundowning but need to eval for other causes. Pt w/ elevated bp's, but no focal neuro abnormalities, delirium due to infection such as UTI?, due to steroids? HISTORY OF  -Type 2 DM  -Dyslipidemia  -HTN: bp's elevated    PLAN:  -Head CT stat to eval for stroke  -DC prednisone  -Cont duonebs,for now  -Cont azithromycin for now  -PT/OT    Daughter updated. Seen at bedside.     Additional Notes:      Case discussed with:  [x]Patient  []Family  []Nursing  []Case Management  DVT Prophylaxis:  [x]Lovenox  []Hep SQ  []SCDs  []Coumadin   []On Heparin gtt    Objective:   VS:   Visit Vitals  BP (!) 183/74 (BP 1 Location: Right upper arm, BP Patient Position: At rest)   Pulse 74   Temp 97.2 °F (36.2 °C)   Resp 20   SpO2 100%   Breastfeeding Unknown      Tmax/24hrs: Temp (24hrs), Av.4 °F (36.3 °C), Min:97.2 °F (36.2 °C), Max:97.9 °F (36.6 °C)    Input/Output:     Intake/Output Summary (Last 24 hours) at 2021  Last data filed at 2021 1422  Gross per 24 hour   Intake 240 ml   Output 750 ml   Net -510 ml       General: Alert, awake, in NAD   Cardiovascular:  RRR, no murmurs  Pulmonary: CTAB  GI:  Soft, nt, nd  Extremities: No edema   Additional:      Labs:    Recent Results (from the past 24 hour(s))   GLUCOSE, POC    Collection Time: 05/19/21  9:43 PM   Result Value Ref Range    Glucose (POC) 154 (H) 70 - 110 mg/dL   GLUCOSE, POC    Collection Time: 05/20/21  8:43 AM   Result Value Ref Range    Glucose (POC) 108 70 - 110 mg/dL   GLUCOSE, POC    Collection Time: 05/20/21 11:32 AM   Result Value Ref Range    Glucose (POC) 143 (H) 70 - 110 mg/dL   GLUCOSE, POC    Collection Time: 05/20/21  3:46 PM   Result Value Ref Range    Glucose (POC) 154 (H) 70 - 110 mg/dL     Additional Data Reviewed:      Signed By: Noemí Ramon MD     May 20, 2021 3:08 PM

## 2021-05-21 NOTE — PROGRESS NOTES
Progress Note         Patient: Kimi Liu MRN: [id number]  CSN: [id number]    YOB: 1934  Age: 80 y.o. Sex: female    DOA: 5/18/2021 LOS:  LOS: 3 days                    Subjective:     Kimi Liu is a 80 y.o. female with a PMHx of COPD, HTN, HLD, and type II DM who is admitted for acute hypoxic respiratory failure secondary to COPD exacerbation along with acute metabolic encephalopathy. Seen in room today  Sitting up in bed, comfortable, NAD  Reports breathing is back to normal  Denies any further complaints  Ready to go home soon    Objective:     Physical Exam:  Visit Vitals  /65 (BP 1 Location: Left upper arm, BP Patient Position: At rest;Semi fowlers)   Pulse 84   Temp 97.9 °F (36.6 °C)   Resp 20   Ht 5' 4\" (1.626 m)   Wt 72.6 kg (160 lb)   SpO2 97%   Breastfeeding Unknown   BMI 27.46 kg/m²        General:         Alert, cooperative, no acute distress    HEENT: NC, Atraumatic. Anicteric sclerae. Lungs: CTA Bilaterally. No Wheezing/Rhonchi/Rales. Heart:  Regular  rhythm,  No murmur, No Rubs, No Gallops  Abdomen: Soft, Non distended, Non tender. +Bowel sounds, no HSM  Extremities: No c/c/e  Psych:   Not anxious or agitated. Neurologic:  Alert and oriented X 3, with some intermittent confusion    Intake and Output:  Current Shift:  05/21 0701 - 05/21 1900  In: 700 [P.O.:700]  Out: 350 [Urine:350]  Last three shifts:  05/19 1901 - 05/21 0700  In: 240 [P.O.:240]  Out: 1150 [Urine:1150]    Labs: Results:       Chemistry Recent Labs     05/21/21  0459 05/19/21  1010   * 187*    138   K 4.1 4.0    105   CO2 29 27   BUN 37* 26*   CREA 1.11 1.23   CA 9.2 9.4   AGAP 4 6   BUCR 33* 21*      CBC w/Diff Recent Labs     05/21/21  0459 05/19/21  1010   WBC 15.9* 7.3   RBC 4.27 4.66   HGB 11.6* 12.7   HCT 35.4 39.4    244      Cardiac Enzymes No results for input(s): CPK, CKND1, SHEA in the last 72 hours.     No lab exists for component: CKRMB, TROIP   Coagulation No results for input(s): PTP, INR, APTT, INREXT in the last 72 hours. Lipid Panel No results found for: CHOL, CHOLPOCT, CHOLX, CHLST, CHOLV, 988212, HDL, HDLP, LDL, LDLC, DLDLP, 814587, VLDLC, VLDL, TGLX, TRIGL, TRIGP, TGLPOCT, CHHD, CHHDX   BNP No results for input(s): BNPP in the last 72 hours. Liver Enzymes No results for input(s): TP, ALB, TBIL, AP in the last 72 hours. No lab exists for component: SGOT, GPT, DBIL   Thyroid Studies No results found for: T4, T3U, TSH, TSHEXT             Assessment and Plan:     Gregoria Pryor is a 80 y.o. female with a PMHx of COPD, HTN, HLD, and type II DM who is admitted for acute hypoxic respiratory failure secondary to COPD exacerbation along with acute metabolic encephalopathy. 1. Acute hypoxic respiratory failure  2. COPD exacerbation  3. Acute metabolic encephalopathy  4. Delirium  5. HTN  6. HLD  7. Type II DM    Continue duo nebs as needed  Continue budesonide, Mucinex, Singulair  Continue IV ABXazithromycin, last dose tomorrow  Continue as needed BP meds  Continue home medsASA, clonidine, lisinopril, metoprolol, statin, verapamil  Continue SSI with Accu-Cheks  Incentive spirometry  PT, OTrecommending  with 24/7 supervision    Attempted to call and discuss patient's care and plan with her daughter, no answer, voicemail received, message left. Case discussed with:  [x]Patient  []Family  [x]Nursing  [x]Case Management  DVT prophylaxis: Lovenox  Diet: Diabetic  Contact: Eliana Huston (daughter)    [phone]  Code Status: Full  Disposition: Likely discharge with [facility] tomorrow      GREGORY Eisenberg,   5/21/2021       Dragon medical dictation software was used for portions of this report. Unintended errors may occur.

## 2021-05-21 NOTE — PROGRESS NOTES
BP was 199/95 at 0610, hydralazine 10 mg iv given, see mar, no acute distress noted, no c/o pain, bp is now 156/82.

## 2021-05-21 NOTE — PROGRESS NOTES
Problem: Falls - Risk of  Goal: *Absence of Falls  Description: Document Lexi Mtz Fall Risk and appropriate interventions in the flowsheet.   Note: Fall Risk Interventions:  Mobility Interventions: Bed/chair exit alarm         Medication Interventions: Patient to call before getting OOB    Elimination Interventions: Bed/chair exit alarm, Call light in reach    History of Falls Interventions: Bed/chair exit alarm         Problem: Patient Education: Go to Patient Education Activity  Goal: Patient/Family Education  Outcome: Progressing Towards Goal

## 2021-05-21 NOTE — PROGRESS NOTES
Problem: Falls - Risk of  Goal: *Absence of Falls  Description: Document Bola Ballesteros Fall Risk and appropriate interventions in the flowsheet.   5/21/2021 0059 by Mohinder Wright RN  Outcome: Progressing Towards Goal  Note: Fall Risk Interventions:  Mobility Interventions: Bed/chair exit alarm         Medication Interventions: Patient to call before getting OOB    Elimination Interventions: Bed/chair exit alarm, Call light in reach    History of Falls Interventions: Bed/chair exit alarm      5/21/2021 0056 by Mohinder Wright RN  Note: Fall Risk Interventions:  Mobility Interventions: Bed/chair exit alarm         Medication Interventions: Patient to call before getting OOB    Elimination Interventions: Bed/chair exit alarm, Call light in reach    History of Falls Interventions: Bed/chair exit alarm         Problem: Patient Education: Go to Patient Education Activity  Goal: Patient/Family Education  5/21/2021 0059 by Mohinder Wright RN  Outcome: Progressing Towards Goal  5/21/2021 0056 by Mohinder Wright RN  Outcome: Progressing Towards Goal     Problem: Pain  Goal: *Control of Pain  Outcome: Progressing Towards Goal

## 2021-05-21 NOTE — ROUTINE PROCESS
Bedside and Verbal shift change report given to Larisa Floyd RN (oncoming nurse) by ANA Stoll RN (offgoing nurse). Report included the following information SBAR, Kardex, MAR and Recent Results.

## 2021-05-22 VITALS
HEIGHT: 64 IN | SYSTOLIC BLOOD PRESSURE: 102 MMHG | DIASTOLIC BLOOD PRESSURE: 56 MMHG | HEART RATE: 74 BPM | RESPIRATION RATE: 20 BRPM | BODY MASS INDEX: 27.31 KG/M2 | WEIGHT: 160 LBS | OXYGEN SATURATION: 97 % | TEMPERATURE: 97.9 F

## 2021-05-22 LAB
ANION GAP SERPL CALC-SCNC: 7 MMOL/L (ref 3–18)
BUN SERPL-MCNC: 33 MG/DL (ref 7–18)
BUN/CREAT SERPL: 29 (ref 12–20)
CALCIUM SERPL-MCNC: 9 MG/DL (ref 8.5–10.1)
CHLORIDE SERPL-SCNC: 109 MMOL/L (ref 100–111)
CO2 SERPL-SCNC: 28 MMOL/L (ref 21–32)
CREAT SERPL-MCNC: 1.12 MG/DL (ref 0.6–1.3)
GLUCOSE BLD STRIP.AUTO-MCNC: 126 MG/DL (ref 70–110)
GLUCOSE BLD STRIP.AUTO-MCNC: 185 MG/DL (ref 70–110)
GLUCOSE BLD STRIP.AUTO-MCNC: 82 MG/DL (ref 70–110)
GLUCOSE SERPL-MCNC: 83 MG/DL (ref 74–99)
POTASSIUM SERPL-SCNC: 3.6 MMOL/L (ref 3.5–5.5)
SODIUM SERPL-SCNC: 144 MMOL/L (ref 136–145)

## 2021-05-22 PROCEDURE — 74011250637 HC RX REV CODE- 250/637: Performed by: INTERNAL MEDICINE

## 2021-05-22 PROCEDURE — 2709999900 HC NON-CHARGEABLE SUPPLY

## 2021-05-22 PROCEDURE — 99239 HOSP IP/OBS DSCHRG MGMT >30: CPT | Performed by: FAMILY MEDICINE

## 2021-05-22 PROCEDURE — 82962 GLUCOSE BLOOD TEST: CPT

## 2021-05-22 PROCEDURE — 74011636637 HC RX REV CODE- 636/637: Performed by: INTERNAL MEDICINE

## 2021-05-22 PROCEDURE — 94640 AIRWAY INHALATION TREATMENT: CPT

## 2021-05-22 PROCEDURE — 74011000250 HC RX REV CODE- 250: Performed by: INTERNAL MEDICINE

## 2021-05-22 PROCEDURE — 74011250636 HC RX REV CODE- 250/636: Performed by: INTERNAL MEDICINE

## 2021-05-22 PROCEDURE — 80048 BASIC METABOLIC PNL TOTAL CA: CPT

## 2021-05-22 PROCEDURE — 36415 COLL VENOUS BLD VENIPUNCTURE: CPT

## 2021-05-22 PROCEDURE — 94761 N-INVAS EAR/PLS OXIMETRY MLT: CPT

## 2021-05-22 RX ORDER — IPRATROPIUM BROMIDE AND ALBUTEROL SULFATE 2.5; .5 MG/3ML; MG/3ML
3 SOLUTION RESPIRATORY (INHALATION)
Status: DISCONTINUED | OUTPATIENT
Start: 2021-05-22 | End: 2021-05-22 | Stop reason: HOSPADM

## 2021-05-22 RX ADMIN — AZITHROMYCIN 500 MG: 500 INJECTION, POWDER, LYOPHILIZED, FOR SOLUTION INTRAVENOUS at 05:42

## 2021-05-22 RX ADMIN — ENOXAPARIN SODIUM 40 MG: 40 INJECTION SUBCUTANEOUS at 10:09

## 2021-05-22 RX ADMIN — METOPROLOL SUCCINATE 200 MG: 50 TABLET, EXTENDED RELEASE ORAL at 11:00

## 2021-05-22 RX ADMIN — LISINOPRIL 40 MG: 40 TABLET ORAL at 10:09

## 2021-05-22 RX ADMIN — BUDESONIDE 500 MCG: 0.5 SUSPENSION RESPIRATORY (INHALATION) at 09:12

## 2021-05-22 RX ADMIN — Medication 10 ML: at 05:42

## 2021-05-22 RX ADMIN — GUAIFENESIN 600 MG: 600 TABLET, EXTENDED RELEASE ORAL at 10:11

## 2021-05-22 RX ADMIN — INSULIN LISPRO 2 UNITS: 100 INJECTION, SOLUTION INTRAVENOUS; SUBCUTANEOUS at 12:00

## 2021-05-22 RX ADMIN — FAMOTIDINE 20 MG: 20 TABLET ORAL at 10:09

## 2021-05-22 RX ADMIN — VERAPAMIL HYDROCHLORIDE 180 MG: 180 TABLET ORAL at 09:00

## 2021-05-22 RX ADMIN — CLONIDINE HYDROCHLORIDE 0.2 MG: 0.1 TABLET ORAL at 10:09

## 2021-05-22 RX ADMIN — ASPIRIN 81 MG: 81 TABLET, CHEWABLE ORAL at 10:09

## 2021-05-22 RX ADMIN — IPRATROPIUM BROMIDE AND ALBUTEROL SULFATE 3 ML: .5; 3 SOLUTION RESPIRATORY (INHALATION) at 09:12

## 2021-05-22 NOTE — DISCHARGE INSTRUCTIONS
Patient Education        Chronic Obstructive Pulmonary Disease (COPD): Care Instructions  Your Care Instructions     Chronic obstructive pulmonary disease (COPD) is a general term for a group of lung diseases, including emphysema and chronic bronchitis. People with COPD have decreased airflow in and out of the lungs, which makes it hard to breathe. The airways also can get clogged with thick mucus. Cigarette smoking is a major cause of COPD. Although there is no cure for COPD, you can slow its progress. Following your treatment plan and taking care of yourself can help you feel better and live longer. Follow-up care is a key part of your treatment and safety. Be sure to make and go to all appointments, and call your doctor if you are having problems. It's also a good idea to know your test results and keep a list of the medicines you take. How can you care for yourself at home? Staying healthy    · Do not smoke. This is the most important step you can take to prevent more damage to your lungs. If you need help quitting, talk to your doctor about stop-smoking programs and medicines. These can increase your chances of quitting for good.     · Avoid colds and flu. Get a pneumococcal vaccine shot. If you have had one before, ask your doctor whether you need a second dose. Get the flu vaccine every fall. If you must be around people with colds or the flu, wash your hands often.     · Avoid secondhand smoke, air pollution, and high altitudes. Also avoid cold, dry air and hot, humid air. Stay at home with your windows closed when air pollution is bad. Medicines and oxygen therapy    · Take your medicines exactly as prescribed. Call your doctor if you think you are having a problem with your medicine. You may be taking medicines such as:  ? Bronchodilators. These help open your airways and make breathing easier. They are either short-acting (work for 6 to 9 hours) or long-acting (work for 24 hours).  You inhale most bronchodilators, so they start to act quickly. Always carry your quick-relief inhaler with you in case you need it while you are away from home. ? Corticosteroids (prednisone, budesonide). These reduce airway inflammation. They come in pill or inhaled form. You must take these medicines every day for them to work well.     · Ask your doctor or pharmacist if a spacer is right for you. A spacer may help you get more inhaled medicine to your lungs. If you use one, ask how to use it properly.     · Do not take any vitamins, over-the-counter medicine, or herbal products without talking to your doctor first.     · If your doctor prescribed antibiotics, take them as directed. Do not stop taking them just because you feel better. You need to take the full course of antibiotics.     · If you use oxygen therapy, use the flow rate your doctor has recommended. Don't change it without talking to your doctor first. Oxygen therapy boosts the amount of oxygen in your blood and helps you breathe easier. Activity    · Get regular exercise. Walking is an easy way to get exercise. Start out slowly, and walk a little more each day.     · Pay attention to your breathing. You are exercising too hard if you can't talk while you exercise.     · Take short rest breaks when doing household chores and other activities.     · Learn breathing methods--such as breathing through pursed lips--to help you become less short of breath.     · If your doctor has not set you up with a pulmonary rehabilitation program, ask if rehab is right for you. Rehab includes exercise programs, education about your disease and how to manage it, help with diet and other changes, and emotional support. Diet    · Eat regular, healthy meals. Use bronchodilators about 1 hour before you eat to make it easier to eat. Eat several small meals instead of three large ones.  Drink beverages at the end of the meal. Avoid foods that are hard to chew.     · Eat foods that contain protein so you don't lose muscle mass.     · Talk with your doctor if you gain too much weight or if you lose weight without trying. Mental health    · Talk to your family, friends, or a therapist about your feelings. Some people feel frightened, angry, hopeless, helpless, and even guilty. Talking openly about bad feelings can help you cope. If these feelings last, talk to your doctor. When should you call for help? Call 911 anytime you think you may need emergency care. For example, call if:    · You have severe trouble breathing. Call your doctor now or seek immediate medical care if:    · You have new or worse trouble breathing.     · You cough up blood.     · You have a fever. Watch closely for changes in your health, and be sure to contact your doctor if:    · You cough more deeply or more often, especially if you notice more mucus or a change in the color of your mucus.     · You have new or worse swelling in your legs or belly.     · You are not getting better as expected. Where can you learn more? Go to http://www.gray.com/  Enter D334 in the search box to learn more about \"Chronic Obstructive Pulmonary Disease (COPD): Care Instructions. \"  Current as of: October 26, 2020               Content Version: 12.8  © 2006-2021 [employer]. Care instructions adapted under license by [employer] (which disclaims liability or warranty for this information). If you have questions about a medical condition or this instruction, always ask your healthcare professional. [place] [address] any warranty or liability for your use of this information.          DISCHARGE SUMMARY from Nurse    PATIENT INSTRUCTIONS:    After general anesthesia or intravenous sedation, for 24 hours or while taking prescription Narcotics:  · Limit your activities  · Do not drive and operate hazardous machinery  · Do not make important personal or business decisions  · Do  not drink alcoholic beverages  · If you have not urinated within 8 hours after discharge, please contact your surgeon on call. Report the following to your surgeon:  · Excessive pain, swelling, redness or odor of or around the surgical area  · Temperature over 100.5  · Nausea and vomiting lasting longer than 4 hours or if unable to take medications  · Any signs of decreased circulation or nerve impairment to extremity: change in color, persistent  numbness, tingling, coldness or increase pain  · Any questions    What to do at Home:  Recommended activity: Activity as tolerated, stand by assistance as needed    If you experience any of the following symptoms  Nausea, vomiting, diarrhea, shortness of breath, fever over 101, severe  pain/hedaches, dizziness, fainting, abnormal bleeding,or any other issues or concerns, please follow up with  Primary Care Physician or emergency department. *  Please give a list of your current medications to your Primary Care Provider. *  Please update this list whenever your medications are discontinued, doses are      changed, or new medications (including over-the-counter products) are added. *  Please carry medication information at all times in case of emergency situations. These are general instructions for a healthy lifestyle:    No smoking/ No tobacco products/ Avoid exposure to second hand smoke  Surgeon General's Warning:  Quitting smoking now greatly reduces serious risk to your health.     Obesity, smoking, and sedentary lifestyle greatly increases your risk for illness    A healthy diet, regular physical exercise & weight monitoring are important for maintaining a healthy lifestyle    You may be retaining fluid if you have a history of heart failure or if you experience any of the following symptoms:  Weight gain of 3 pounds or more overnight or 5 pounds in a week, increased swelling in our hands or feet or shortness of breath while lying flat in bed. Please call your doctor as soon as you notice any of these symptoms; do not wait until your next office visit. The discharge information has been reviewed with the patient and caregiver. The patient and caregiver verbalized understanding. Discharge medications reviewed with the patient and caregiver and appropriate educational materials and side effects teaching were provided. ___________________________________________________________________________________________________________________________________    Patient armband removed and shredded      Patient Education        COPD Exacerbation Plan: Care Instructions  Your Care Instructions     If you have chronic obstructive pulmonary disease (COPD), your usual shortness of breath could suddenly get worse. You may start coughing more and have more mucus. This flare-up is called a COPD exacerbation (say \"fr-GTS-le-BAY-shun\"). A lung infection or air pollution could set off an exacerbation. Sometimes it can happen after a quick change in temperature or being around chemicals. Work with your doctor to make a plan for dealing with an exacerbation. You can better manage it if you plan ahead. Follow-up care is a key part of your treatment and safety. Be sure to make and go to all appointments, and call your doctor if you are having problems. It's also a good idea to know your test results and keep a list of the medicines you take. How can you care for yourself at home? During an exacerbation  · Do not panic if you start to have one. Quick treatment at home may help you prevent serious breathing problems. If you have a COPD exacerbation plan that you developed with your doctor, follow it. · Take your medicines exactly as your doctor tells you.  ? Use your inhaler as directed by your doctor. If your symptoms do not get better after you use your medicine, have someone take you to the emergency room. Call an ambulance if necessary. ?  With inhaled medicines, a spacer or a nebulizer may help you get more medicine to your lungs. Ask your doctor or pharmacist how to use them properly. Practice using the spacer in front of a mirror before you have an exacerbation. This may help you get the medicine into your lungs quickly. ? If your doctor has given you steroid pills, take them as directed. ? Your doctor may have given you a prescription for antibiotics, which you can fill if you need it. ? Talk to your doctor if you have any problems with your medicine. And call your doctor if you have to use your antibiotic or steroid pills. Preventing an exacerbation  · Do not smoke. This is the most important step you can take to prevent more damage to your lungs and prevent problems. If you already smoke, it is never too late to stop. If you need help quitting, talk to your doctor about stop-smoking programs and medicines. These can increase your chances of quitting for good. · Take your daily medicines as prescribed. · Avoid colds and flu. ? Get a pneumococcal vaccine. ? Get a flu vaccine each year, as soon as it is available. Ask those you live or work with to do the same, so they will not get the flu and infect you. ? Try to stay away from people with colds or the flu. ? Wash your hands often. · Avoid secondhand smoke; air pollution; cold, dry air; hot, humid air; and high altitudes. Stay at home with your windows closed when air pollution is bad. · Learn breathing techniques for COPD, such as breathing through pursed lips. These techniques can help you breathe easier during an exacerbation. When should you call for help? Call 911 anytime you think you may need emergency care. For example, call if:    · You have severe trouble breathing.     · You have severe chest pain.    Call your doctor now or seek immediate medical care if:    · You have new or worse shortness of breath.     · You develop new chest pain.     · You are coughing more deeply or more often, especially if you notice more mucus or a change in the color of your mucus.     · You cough up blood.     · You have new or increased swelling in your legs or belly.     · You have a fever. Watch closely for changes in your health, and be sure to contact your doctor if:    · You need to use your antibiotic or steroid pills.     · Your symptoms are getting worse. Where can you learn more? Go to http://www.gray.com/  Enter U536 in the search box to learn more about \"COPD Exacerbation Plan: Care Instructions. \"  Current as of: October 26, 2020               Content Version: 12.8  © 0215-8512 [employer]. Care instructions adapted under license by [employer] (which disclaims liability or warranty for this information). If you have questions about a medical condition or this instruction, always ask your healthcare professional. [address] any warranty or liability for your use of this information. Patient Education        High Blood Pressure: Care Instructions  Overview     It's normal for blood pressure to go up and down throughout the day. But if it stays up, you have high blood pressure. Another name for high blood pressure is hypertension. Despite what a lot of people think, high blood pressure usually doesn't cause headaches or make you feel dizzy or lightheaded. It usually has no symptoms. But it does increase your risk of stroke, heart attack, and other problems. You and your doctor will talk about your risks of these problems based on your blood pressure. Your doctor will give you a goal for your blood pressure. Your goal will be based on your health and your age. Lifestyle changes, such as eating healthy and being active, are always important to help lower blood pressure. You might also take medicine to reach your blood pressure goal.  Follow-up care is a key part of your treatment and safety.  Be sure to make and go to all appointments, and call your doctor if you are having problems. It's also a good idea to know your test results and keep a list of the medicines you take. How can you care for yourself at home? Medical treatment  · If you stop taking your medicine, your blood pressure will go back up. You may take one or more types of medicine to lower your blood pressure. Be safe with medicines. Take your medicine exactly as prescribed. Call your doctor if you think you are having a problem with your medicine. · Talk to your doctor before you start taking aspirin every day. Aspirin can help certain people lower their risk of a heart attack or stroke. But taking aspirin isn't right for everyone, because it can cause serious bleeding. · See your doctor regularly. You may need to see the doctor more often at first or until your blood pressure comes down. · If you are taking blood pressure medicine, talk to your doctor before you take decongestants or anti-inflammatory medicine, such as ibuprofen. Some of these medicines can raise blood pressure. · Learn how to check your blood pressure at home. Lifestyle changes  · Stay at a healthy weight. This is especially important if you put on weight around the waist. Losing even 10 pounds can help you lower your blood pressure. · If your doctor recommends it, get more exercise. Walking is a good choice. Bit by bit, increase the amount you walk every day. Try for at least 30 minutes on most days of the week. You also may want to swim, bike, or do other activities. · Avoid or limit alcohol. Talk to your doctor about whether you can drink any alcohol. · Try to limit how much sodium you eat to less than 2,300 milligrams (mg) a day. Your doctor may ask you to try to eat less than 1,500 mg a day. · Eat plenty of fruits (such as bananas and oranges), vegetables, legumes, whole grains, and low-fat dairy products. · Lower the amount of saturated fat in your diet.  Saturated fat is found in animal products such as milk, cheese, and meat. Limiting these foods may help you lose weight and also lower your risk for heart disease. · Do not smoke. Smoking increases your risk for heart attack and stroke. If you need help quitting, talk to your doctor about stop-smoking programs and medicines. These can increase your chances of quitting for good. When should you call for help? Call  911 anytime you think you may need emergency care. This may mean having symptoms that suggest that your blood pressure is causing a serious heart or blood vessel problem. Your blood pressure may be over 180/120. For example, call 911 if:    · You have symptoms of a heart attack. These may include:  ? Chest pain or pressure, or a strange feeling in the chest.  ? Sweating. ? Shortness of breath. ? Nausea or vomiting. ? Pain, pressure, or a strange feeling in the back, neck, jaw, or upper belly or in one or both shoulders or arms. ? Lightheadedness or sudden weakness. ? A fast or irregular heartbeat.     · You have symptoms of a stroke. These may include:  ? Sudden numbness, tingling, weakness, or loss of movement in your face, arm, or leg, especially on only one side of your body. ? Sudden vision changes. ? Sudden trouble speaking. ? Sudden confusion or trouble understanding simple statements. ? Sudden problems with walking or balance. ? A sudden, severe headache that is different from past headaches.     · You have severe back or belly pain. Do not wait until your blood pressure comes down on its own. Get help right away. Call your doctor now or seek immediate care if:    · Your blood pressure is much higher than normal (such as 180/120 or higher), but you don't have symptoms.     · You think high blood pressure is causing symptoms, such as:  ? Severe headache.  ? Blurry vision.    Watch closely for changes in your health, and be sure to contact your doctor if:    · Your blood pressure measures higher than your doctor recommends at least 2 times. That means the top number is higher or the bottom number is higher, or both.     · You think you may be having side effects from your blood pressure medicine. Where can you learn more? Go to http://www.gray.com/  Enter [id number] in the search box to learn more about \"High Blood Pressure: Care Instructions. \"  Current as of: August 31, 2020               Content Version: 12.8  © 2006-2021 [employer]. Care instructions adapted under license by [employer] (which disclaims liability or warranty for this information). If you have questions about a medical condition or this instruction, always ask your healthcare professional. [place] [address] any warranty or liability for your use of this information. Patient Education        COPD Exacerbation Plan: Care Instructions  Your Care Instructions     If you have chronic obstructive pulmonary disease (COPD), your usual shortness of breath could suddenly get worse. You may start coughing more and have more mucus. This flare-up is called a COPD exacerbation (say \"dw-FDT-xr-BAY-shun\"). A lung infection or air pollution could set off an exacerbation. Sometimes it can happen after a quick change in temperature or being around chemicals. Work with your doctor to make a plan for dealing with an exacerbation. You can better manage it if you plan ahead. Follow-up care is a key part of your treatment and safety. Be sure to make and go to all appointments, and call your doctor if you are having problems. It's also a good idea to know your test results and keep a list of the medicines you take. How can you care for yourself at home? During an exacerbation  · Do not panic if you start to have one. Quick treatment at home may help you prevent serious breathing problems. If you have a COPD exacerbation plan that you developed with your doctor, follow it.   · Take your medicines exactly as your doctor tells you.  ? Use your inhaler as directed by your doctor. If your symptoms do not get better after you use your medicine, have someone take you to the emergency room. Call an ambulance if necessary. ? With inhaled medicines, a spacer or a nebulizer may help you get more medicine to your lungs. Ask your doctor or pharmacist how to use them properly. Practice using the spacer in front of a mirror before you have an exacerbation. This may help you get the medicine into your lungs quickly. ? If your doctor has given you steroid pills, take them as directed. ? Your doctor may have given you a prescription for antibiotics, which you can fill if you need it. ? Talk to your doctor if you have any problems with your medicine. And call your doctor if you have to use your antibiotic or steroid pills. Preventing an exacerbation  · Do not smoke. This is the most important step you can take to prevent more damage to your lungs and prevent problems. If you already smoke, it is never too late to stop. If you need help quitting, talk to your doctor about stop-smoking programs and medicines. These can increase your chances of quitting for good. · Take your daily medicines as prescribed. · Avoid colds and flu. ? Get a pneumococcal vaccine. ? Get a flu vaccine each year, as soon as it is available. Ask those you live or work with to do the same, so they will not get the flu and infect you. ? Try to stay away from people with colds or the flu. ? Wash your hands often. · Avoid secondhand smoke; air pollution; cold, dry air; hot, humid air; and high altitudes. Stay at home with your windows closed when air pollution is bad. · Learn breathing techniques for COPD, such as breathing through pursed lips. These techniques can help you breathe easier during an exacerbation. When should you call for help? Call 911 anytime you think you may need emergency care.  For example, call if:    · You have severe trouble breathing.     · You have severe chest pain. Call your doctor now or seek immediate medical care if:    · You have new or worse shortness of breath.     · You develop new chest pain.     · You are coughing more deeply or more often, especially if you notice more mucus or a change in the color of your mucus.     · You cough up blood.     · You have new or increased swelling in your legs or belly.     · You have a fever. Watch closely for changes in your health, and be sure to contact your doctor if:    · You need to use your antibiotic or steroid pills.     · Your symptoms are getting worse. Where can you learn more? Go to http://www.gray.com/  Enter U536 in the search box to learn more about \"COPD Exacerbation Plan: Care Instructions. \"  Current as of: October 26, 2020               Content Version: 12.8  © 4164-8157 [employer]. Care instructions adapted under license by [employer] (which disclaims liability or warranty for this information). If you have questions about a medical condition or this instruction, always ask your healthcare professional. [address] any warranty or liability for your use of this information.

## 2021-05-22 NOTE — PROGRESS NOTES
Discharge order noted for today. Pt has been accepted to [facility] agency. Spoke with daughter and she is agreeable to the transition plan today. Transport has been arranged through her daughter. Patient's discharge summary and home health  orders have been forwarded to [address] [place] [address] [facility] Updated bedside RN  to the transition plan.   Discharge information has been documented on the AVS.       Lydia Mayen RN - Outcomes Manager  [phone]

## 2021-05-22 NOTE — DISCHARGE SUMMARY
Discharge Summary      Patient: Aurora Snow MRN: [id number]  CSN: [id number]    YOB: 1934  Age: 80 y.o. Sex: female    DOA: 5/18/2021 LOS:  LOS: 4 days   Discharge Date: 5/22/21     Admission Diagnoses: COPD with acute exacerbation ([facility] [address] [J44.1]  Hypoxia [R09.02]    Discharge Diagnoses:    1. Acute hypoxic respiratory failure  2. COPD exacerbation  3. Acute metabolic encephalopathy  4. Delirium  5. HTN  6. HLD  7. Type II DM      Discharge Condition: Stable    PHYSICAL EXAM  Visit Vitals  /64 (BP 1 Location: Left upper arm, BP Patient Position: At rest)   Pulse 83   Temp 98.6 °F (37 °C)   Resp 18   Ht 5' 4\" (1.626 m)   Wt 72.6 kg (160 lb)   SpO2 98%   Breastfeeding Unknown   BMI 27.46 kg/m²       General: Alert, cooperative, no acute distress    HEENT: NC, Atraumatic. EOMI. Anicteric sclerae. Lungs:  CTA Bilaterally. No Wheezing/Rhonchi/Rales. Heart:  Regular  rhythm,  No murmur, No Rubs, No Gallops  Abdomen: Soft, Non distended, Non tender. +Bowel sounds, no HSM  Extremities: No c/c/e  Psych:   Not anxious or agitated. Neurologic:  Alert and oriented X 3. No acute neurological deficits. Hospital Course:   Aurora Snow is a 80 y.o. female with a PMHx of COPD, HTN, HLD, and type II DM who presented to the ED with complaints of worsening shortness of breath. She reported using her albuterol inhaler and nebulizer at home without relief. She also reported an associated cough, but denied any recent fevers, chills, chest pain, or other symptoms. She had been vaccinated against [place] 232. In the ED, she was found to be hypoxic and was started on O2 with improvement. Her other vital signs were reassuring, but she was found to have diffuse bilateral wheezing. Labs were overall reassuring and notable for her troponin of less than 0.04 and NT proBNP 206. UA was reassuring.   EKG showed sinus rhythm at 72 bpm with first-degree AV block and a CXR showed mild interstitial edema versus interstitial infiltrate and possible trace pleural effusions. Ms. Radha Chahal was then started on duo nebs, IV steroids and antibiotics, and admitted for acute hypoxic respiratory failure secondary to COPD exacerbation. Ms. Radha Chahal was continued on O2, duo nebs, IV steroids and antibiotics overnight and had significant improvement in her shortness of breath and hypoxia. The following day she was able to be taken off of oxygen and her SPO2 remained in the upper 90s. Despite this, she was found on the floor without evidence of injury and had to be helped back to the bed. She was noted to be agitated at times which per her daughter was far from normal.  A CT head was done and was negative for acute process. Despite being medically stable for discharge, it was decided to keep her for additional monitoring. Her agitation was attributed to the steroids which were discontinued. Over the next couple days, she was monitored and given supportive care and continued on O2, duo nebs and antibiotics. She continued to improve and remained without further agitation. Her daughter was able to visit and felt comfortable taking her home. 5/22/2021, she had remained hemodynamically stable. Return precautions were discussed and she was given instructions and prescriptions for the medications as below. She was also instructed to follow up with her PCP and was then discharged home with home health. Consults:   None    Significant Diagnostic Studies:     CXR 5/18/2021:  1. Mild interstitial edema versus interstitial infiltrate. 2. Possible trace pleural effusions. CT head without contrast 5/20/2021:  No acute intracranial process evident. MR is more sensitive to acute ischemic  events. Duplex lower extremity venous bilateral 5/20/2021:  · No evidence of acute deep vein thrombosis in the right common femoral, superficial femoral, popliteal, posterior tibial, and peroneal veins.  The veins were imaged in the transverse and longitudinal planes. The vessels showed normal color filling and compressibility. Doppler interrogation showed phasic and spontaneous flow. Technically difficult exam patient unable to be still. Gross patency observed in the left posterior and peroneal veins within the left lower extremity, patient unable to be still. No definitive DVT's noted      Procedures Performed: None      Discharge Medications:  Current Discharge Medication List      START taking these medications    Details   !! albuterol-ipratropium (DUO-NEB) 2.5 mg-0.5 mg/3 ml nebu 3 mL by Nebulization route every four (4) hours as needed for Wheezing or Shortness of Breath. Qty: 30 Nebule, Refills: 1  Start date: 5/20/2021       !! - Potential duplicate medications found. Please discuss with provider. CONTINUE these medications which have NOT CHANGED    Details   albuterol (PROVENTIL HFA, VENTOLIN HFA, PROAIR HFA) 90 mcg/actuation inhaler Take 2 Puffs by inhalation every four (4) hours as needed for Wheezing or Shortness of Breath. Indications: chronic obstructive pulmonary disease  Qty: 1 Inhaler, Refills: 0      metoprolol succinate 200 mg CSpX Take 1 Tab by mouth daily. fluticasone propionate (FLONASE) 50 mcg/actuation nasal spray 2 spar each nostril Bid  Qty: 1 Bottle, Refills: 0      !! albuterol-ipratropium (DUO-NEB) 2.5 mg-0.5 mg/3 ml nebu 3 mL by Nebulization route every four (4) hours as needed for Wheezing. Indications: bronchi muscle spasm resulting from COPD  Qty: 30 Nebule, Refills: 2      aspirin 81 mg chewable tablet Take 81 mg by mouth daily. CETIRIZINE HCL (CETIRIZINE PO) Take 10 mg by mouth nightly. NEBULIZER by Does Not Apply route. Cholecalciferol, Vitamin D3, (VITAMIN D3) 1,000 unit cap Take 1,000 Units by mouth daily. cloNIDine (CATAPRESS) 0.2 mg tablet Take 0.2 mg by mouth two (2) times a day.         glimepiride (AMARYL) 4 mg tablet Take 2 mg by mouth daily.        lisinopril (PRINIVIL, ZESTRIL) 20 mg tablet Take 40 mg by mouth daily. pravastatin (PRAVACHOL) 40 mg tablet Take 40 mg by mouth daily. verapamil SR (CALAN-SR) 180 mg CR tablet Take 180 mg by mouth daily. !! - Potential duplicate medications found. Please discuss with provider. STOP taking these medications       famotidine (PEPCID) 20 mg tablet Comments:   Reason for Stopping:                Activity: activity as tolerated    Diet: Cardiac Diet    Wound Care: None needed      Follow-up Information     Follow up With Specialties Details Why Bob Doran [address] [place], MD General Practice   Eleanor Slater Hospitalistin  [facility] [id number]  [email or web address]    [address]  your prefered agency, chosen to continue managing healthcare needs [address]    Nichole López MD General Practice Schedule an appointment as soon as possible for a visit F/U  [address]  [phone]             Minutes spent on discharge: >30 minutes spent coordinating this discharge (review instructions/follow-up, prescriptions, preparing report for sign off)          GREGORY Kaminski DO   May 22, 2021       [facility] medical dictation software was used for portions of this report. Unintended errors may occur.

## 2021-05-22 NOTE — PROGRESS NOTES
ADULT PROTOCOL: JET AEROSOL ASSESSMENT    Patient  22 Josue Randall     80 y.o.   female     5/22/2021  11:21 AM    Breath Sounds Pre Procedure:  Breath Sounds Bilateral: Diminished, Coarse                                            Breath Sounds Post Procedure: Breath Sounds Bilateral: Diminished                                               Breathing pattern: Pre procedure  Breathing Pattern: Regular          Post procedure  Breathing Pattern: Regular    Cough: Pre procedure  Cough: Productive, Moist               Post procedure Cough: Productive    Heart Rate: Pre procedure Pulse: 72           Post procedure Pulse: 77    Resp Rate: Pre procedure  Respirations: 20           Post procedure          Nebulizer Therapy: Current medications Aerosolized Medications: Pulmicort       Problem List:   Patient Active Problem List   Diagnosis Code    Hypoxia R09.02    Controlled type 2 diabetes mellitus, without long-term current use of insulin ([facility]) E11.9    Essential hypertension I10    COPD exacerbation ([facility]) J44.1    Suspected COVID-19 virus infection Z20.822    Respiratory failure ([facility]) J96.90    COPD with acute exacerbation ([facility]) J44.1       Patient alert and cooperative to use MDI: Yes    Home Respiratory Therapy Regimen/Frequency:  YES  Medication DuoNeb, Albuterol  Device Neb, MDI  Frequency Q4 PRN    SEVERITY INDEX:    ITEM 0 1 2 3 4 Score   Respiratory Pattern and or Rate Regular  10-19 Regular  20-24   24-30    30-34 Severe SOB or   Greater than 35 0   Breath Sounds Clear Occasional Wheeze Mild Wheezing Moderate Wheezing  wheezing/Absent breath sounds 0   Shortness of Breath None Dyspnea on Exertion Dyspnea at Rest Moderate Shortness of Breath at Rest Severe Shortness of Breath - Limited Speech 1       Total Score:  1    * Scoring Guidelines  0-4 pts:  PRN-BID   5-7 pts:  BID, TID, QID  8-9 pts:  TID, QID, Q6  10-12 pts:  Q4-Q6  * - Guidelines used with clinical judgement.   PRN Treatments can be ordered to supplement scheduled treatments. Regardless of score, frequency should not be less than normal home regimen.     Recommended Order/Frequency:  PRN per home routine      Comments:          Respiratory Therapist: Maico Siegel RT

## 2021-05-22 NOTE — PROGRESS NOTES
Problem: Falls - Risk of  Goal: *Absence of Falls  Description: Document Everardo [place] Fall Risk and appropriate interventions in the flowsheet. Outcome: Progressing Towards Goal  Note: Fall Risk Interventions:  Mobility Interventions: Communicate number of staff needed for ambulation/transfer, Patient to call before getting OOB, Bed/chair exit alarm         Medication Interventions: Bed/chair exit alarm, Patient to call before getting OOB    Elimination Interventions: Call light in reach, Bed/chair exit alarm, Stay With Me (per policy)    History of Falls Interventions: Bed/chair exit alarm, Room close to nurse's station         Problem: Chronic Obstructive Pulmonary Disease (COPD)  Goal: *Oxygen saturation during activity within specified parameters  Outcome: Progressing Towards Goal     Problem: Diabetes Self-Management  Goal: *Disease process and treatment process  Description: Define diabetes and identify own type of diabetes; list 3 options for treating diabetes.   Outcome: Progressing Towards Goal

## 2021-12-29 ENCOUNTER — TRANSCRIBE ORDER (OUTPATIENT)
Dept: SCHEDULING | Age: 86
End: 2021-12-29

## 2021-12-29 DIAGNOSIS — Z12.31 VISIT FOR SCREENING MAMMOGRAM: Primary | ICD-10-CM

## 2022-01-06 ENCOUNTER — OFFICE VISIT (OUTPATIENT)
Dept: PULMONOLOGY | Age: 87
End: 2022-01-06
Payer: MEDICARE

## 2022-01-06 VITALS
SYSTOLIC BLOOD PRESSURE: 136 MMHG | HEIGHT: 64 IN | OXYGEN SATURATION: 95 % | BODY MASS INDEX: 28.34 KG/M2 | WEIGHT: 166 LBS | DIASTOLIC BLOOD PRESSURE: 66 MMHG | RESPIRATION RATE: 14 BRPM | TEMPERATURE: 97.5 F | HEART RATE: 80 BPM

## 2022-01-06 DIAGNOSIS — R06.09 DYSPNEA ON EXERTION: ICD-10-CM

## 2022-01-06 DIAGNOSIS — J42 CHRONIC BRONCHITIS, UNSPECIFIED CHRONIC BRONCHITIS TYPE (HCC): ICD-10-CM

## 2022-01-06 DIAGNOSIS — R93.89 ABNORMAL CT OF THE CHEST: Primary | ICD-10-CM

## 2022-01-06 DIAGNOSIS — Z23 NEEDS FLU SHOT: ICD-10-CM

## 2022-01-06 DIAGNOSIS — J44.9 ASTHMA-COPD OVERLAP SYNDROME (HCC): ICD-10-CM

## 2022-01-06 PROCEDURE — 1101F PT FALLS ASSESS-DOCD LE1/YR: CPT | Performed by: INTERNAL MEDICINE

## 2022-01-06 PROCEDURE — 99215 OFFICE O/P EST HI 40 MIN: CPT | Performed by: INTERNAL MEDICINE

## 2022-01-06 PROCEDURE — G8427 DOCREV CUR MEDS BY ELIG CLIN: HCPCS | Performed by: INTERNAL MEDICINE

## 2022-01-06 PROCEDURE — G8536 NO DOC ELDER MAL SCRN: HCPCS | Performed by: INTERNAL MEDICINE

## 2022-01-06 PROCEDURE — G0008 ADMIN INFLUENZA VIRUS VAC: HCPCS | Performed by: INTERNAL MEDICINE

## 2022-01-06 PROCEDURE — 1090F PRES/ABSN URINE INCON ASSESS: CPT | Performed by: INTERNAL MEDICINE

## 2022-01-06 PROCEDURE — G8510 SCR DEP NEG, NO PLAN REQD: HCPCS | Performed by: INTERNAL MEDICINE

## 2022-01-06 PROCEDURE — G8419 CALC BMI OUT NRM PARAM NOF/U: HCPCS | Performed by: INTERNAL MEDICINE

## 2022-01-06 PROCEDURE — 90694 VACC AIIV4 NO PRSRV 0.5ML IM: CPT | Performed by: INTERNAL MEDICINE

## 2022-01-06 RX ORDER — FLUTICASONE FUROATE, UMECLIDINIUM BROMIDE AND VILANTEROL TRIFENATATE 100; 62.5; 25 UG/1; UG/1; UG/1
1 POWDER RESPIRATORY (INHALATION) DAILY
COMMUNITY
Start: 2021-09-08

## 2022-01-06 RX ORDER — NIFEDIPINE 60 MG/1
60 TABLET, EXTENDED RELEASE ORAL DAILY
COMMUNITY
Start: 2021-09-09

## 2022-01-06 RX ORDER — ACETYLCYSTEINE 200 MG/ML
3 SOLUTION ORAL; RESPIRATORY (INHALATION)
COMMUNITY
Start: 2021-09-08

## 2022-01-06 RX ORDER — LANCETS 33 GAUGE
EACH MISCELLANEOUS
COMMUNITY
Start: 2021-12-06

## 2022-01-06 RX ORDER — CALCIUM CITRATE/VITAMIN D3 200MG-6.25
TABLET ORAL
COMMUNITY
Start: 2021-12-03

## 2022-01-06 NOTE — PROGRESS NOTES
Anup Duvall is a 80 y.o. female who presents for routine immunizations. She denies any symptoms , reactions or allergies that would exclude them from being immunized today. Risks and adverse reactions were discussed and the VIS was given to them. All questions were addressed. She was observed for 10 min post injection. There were no reactions observed.     Mitzi Strong LPN

## 2022-01-06 NOTE — PATIENT INSTRUCTIONS
Please call our clinic back at [phone] if you have not received a follow up appointment within 30 days prior the recommended follow up time. Please also call our office if you are not tolerating treatment plan and/or if you are experiencing any difficulties with the [employer]  [employer] you may be using or is assigned to you. If you have a CPAP/BIPAP or home ventilator device, your DME company is supposed to provide you with replacement filters, tubing and masks. You can either call them when you need new supplies or you can arrange for an automatic shipment schedule. Your need to be seen by our office at least annually to renew the prescription for these supplies. Please make note of who your DME company is and their phone number. Please make sure that you clean your mask and hosing on a regular basis.   Your DME can provide you with additional information regarding proper care and cleaning of your device- Thank you

## 2022-01-06 NOTE — PROGRESS NOTES
[facility] Pulmonary Specialist  Pulmonary, Critical Care, and Sleep Medicine     Office Progress Note: Hospital Follow up      Primary Care Physician: Sherryle Phi, NP     Reason for Visit:  Hospital Follow Up- abnormal chest imaging     Assessment:  · Abnormal Lung Imaging: RML collapsed on 9/2/21- needs follow up imaging  ·    · Probable chronic bronchitis with probable asthma in the setting of second hand smoke exposure: Bronchial wall thickening noted on CT imaging. Significantly elevated IgE and eosinophil count more suggestive of asthma. Minimal emphysematous changes noted on CT imaging  ·   · Eosinophilia  ·    · Dyspnea - now improved but not resolved- mostly when walking up stairs  ·    · Hypertension-On several agents, including beta blcoker  ·    · Hyperlipidemia    Plan:  · Obtain CT Chest  · Recheck CBC  · Flu shot today  · Continue with Trelegy for now  · Continue PRN albuterol- monitor use  · Monitor and avoid respiratory triggers  · Follow up with Primary Care Provider (PCP) as directed and for routine health care maintenance. · Clinic follow up             History of Present Illness: Ms. Radha Mancera is a 80 y.o. female patient who presents for follow up for hospital follow up for respiratory failure, report of COPD, elevated IgE  and abnormal chest imagine. The history was provided by the patient and adult daughter    Prior Hospitalizations:  · 6/30/19-07/03/19: [facility]: Community Acquired Pneumonia with right middle lobes atelectasis  · 12/20/20: ER: Bronchitis with wheezing  · 5/18/21-5/22/21: Acute respiratory failure, COPD, Delerium and Hypertension  · 9/1/21- 9/11/21 [facility]: Acute respiratory failure, COPD, elevated IgE and Eosinophilia     The patient was previously see in our clinic for hospital follow up in 2019 for community acquired pneumonia.   Since that time the patient has been admitted for recurrent respiratory failure and report of COPD.  At her last hospital admission, the patient was noted to have consolidation of right lung and elevated IgE and eosinophilia. The patient was discharged on Trelegy and supplemental oxyge. The patient reports that she is no longer requiring oxygen. She reports that she has been steadily improving but she does continue with some shortness of breath with walking up stairs    She does report coughing up white sputum that ranges from thin to thick. The patient produced some sputum today in clinic and it was a foamy-white consistency. Does not appear to be thrush. The patient does rinse her mouth after Trelegy use. She denies any sores in her mouth or throat. No hemoptysis. No chest pain    She has a good appetite and is maintaining weight. She denies any night sweats. She gets up to urinate every 4 hours during the night so sleep is fragments    The patient denies a history of asthma. The patient is a never smoker but does report extensive 2nd hand smoke throughout her life. The patient has PRN albuterol. . She last used more than one month ago and she is not sure if it provides any benefit. She has what sounds like a nebulizer which she used when she was last discharged from the hospital in September but she is currently not useing    Pets: Rachelle (patient's daughter's dog)    Patient reports she has gotten COVID vaccination with booster in November 2021    3 most recent PHQ Screens 1/6/2022   Little interest or pleasure in doing things Not at all   Feeling down, depressed, irritable, or hopeless Not at all   Total Score PHQ 2 0       There is no immunization history for the selected administration types on file for this patient.     Past Medical History:  Past Medical History:   Diagnosis Date    Bronchitis     Diabetes ([facility] [address]     Hyperlipemia     Hypertension     Pneumonia Jun-Jul 2019       Past Surgical History:  Past Surgical History:   Procedure Laterality Date    COLONOSCOPY N/A 6/7/2017 COLONOSCOPY / polypectomy performed by [facility]ba [facility] at [facility] ENDOSCOPY    HX CATARACT REMOVAL      HX COLONOSCOPY      2011       Family History:  Family History   Problem Relation Age of Onset    Heart Disease Mother     Diabetes Father     Breast Cancer Daughter        Social History:  Social History     Tobacco Use    Smoking status: Passive Smoke Exposure - Never Smoker    Smokeless tobacco: Never Used   Substance Use Topics    Alcohol use: No    Drug use: No        Medications:  Current Outpatient Medications on File Prior to Visit   Medication Sig Dispense Refill    NIFEdipine ER (PROCARDIA XL) 60 mg ER tablet Take 60 mg by mouth daily.  TRUEplus Lancets 33 gauge misc       fluticasone-umeclidinium-vilanterol (Trelegy Ellipta) 100-62.5-25 mcg inhaler Take 1 Puff by inhalation daily.  True Metrix Glucose Test Strip strip       acetylcysteine (MUCOMYST) 200 mg/mL (20 %) solution Take 3 mL by inhalation.  albuterol (PROVENTIL HFA, VENTOLIN HFA, PROAIR HFA) 90 mcg/actuation inhaler Take 2 Puffs by inhalation every four (4) hours as needed for Wheezing or Shortness of Breath. Indications: chronic obstructive pulmonary disease 1 Inhaler 0    metoprolol succinate 200 mg CSpX Take 1 Tab by mouth daily.  fluticasone propionate (FLONASE) 50 mcg/actuation nasal spray 2 spar each nostril Bid (Patient taking differently: 2 Sprays by Both Nostrils route daily. ) 1 Bottle 0    albuterol-ipratropium (DUO-NEB) 2.5 mg-0.5 mg/3 ml nebu 3 mL by Nebulization route every four (4) hours as needed for Wheezing. Indications: bronchi muscle spasm resulting from COPD 30 Nebule 2    aspirin 81 mg chewable tablet Take 81 mg by mouth daily.  NEBULIZER by Does Not Apply route.  cloNIDine (CATAPRESS) 0.2 mg tablet Take 0.2 mg by mouth two (2) times a day.  glimepiride (AMARYL) 4 mg tablet Take 2 mg by mouth daily.  pravastatin (PRAVACHOL) 40 mg tablet Take 40 mg by mouth daily.  CETIRIZINE HCL (CETIRIZINE PO) Take 10 mg by mouth nightly.  Cholecalciferol, Vitamin D3, (VITAMIN D3) 1,000 unit cap Take 1,000 Units by mouth daily.  lisinopril (PRINIVIL, ZESTRIL) 20 mg tablet Take 40 mg by mouth daily. (Patient not taking: Reported on 1/6/2022)      verapamil SR (CALAN-SR) 180 mg CR tablet Take 180 mg by mouth daily. (Patient not taking: Reported on 1/6/2022)       No current facility-administered medications on file prior to visit. Allergy:  No Known Allergies    Review of Systems  General ROS: positive for  - fatigue but much improved  negative for - chills, fever, hot flashes, malaise, night sweats or sleep disturbance  ENT ROS: negative for - epistaxis, headaches, hearing change, nasal congestion, nasal discharge, nasal polyps, oral lesions, sinus pain, sneezing, sore throat, tinnitus, vertigo, visual changes or vocal changes  Hematological and Lymphatic ROS: negative for - bleeding problems, blood clots, bruising, jaundice, pallor or swollen lymph nodes  Endocrine ROS: negative for - polydipsia/polyuria, skin changes, temperature intolerance or unexpected weight changes  Respiratory ROS:as noted agove  Cardiovascular ROS: no chest pain    Gastrointestinal ROS: no abdominal pain, change in bowel habits, or black or bloody stools  Genito-Urinary ROS: no dysuria, + nocturia, or hematuria  Musculoskeletal ROS: positive for - joint stiffness- stable  Neurological ROS: no TIA or stroke symptoms  Dermatological ROS: negative for - pruritus, rash or skin lesion changes   Psychological ROS: negative   Otherwise negative and as noted above    Physical Exam:     Blood pressure 136/66, pulse 80, temperature 97.5 °F (36.4 °C), temperature source Tympanic, resp. rate 14, height 5' 4\" (1.626 m), weight 75.3 kg (166 lb), SpO2 95 %, unknown if currently breastfeeding. on RA, Body mass index is 28.49 kg/m².      General: No distress, appears stated age, pleasant and cooperative  HEENT: PERRL, EOMI, throat without erythema or exudate, Tongue- normal Uvula- midline, missing several teeth  Neck: Supple,  no abnormally enlarged lymph nodes, thyroid is not enlarged, non-tender, No JVD  Chest: normal- no gross abnormalities  Lungs: Moderate air entry, clear to auscultation bilaterally, - NO wheezing, no crackles, no rhonchi  Heart:  Regular rate and rhythm, S1S2 present, without murmur  Abdomen: Protuberant, abdomen is soft without significant tenderness, or guarding  Extremity: negative for edema, cyanosis or clubbing  Skin: Skin color, texture, turgor normal. No rashes or lesions  Neuro: CN2-12 grossly intact, normal muscle tone, alert and oriented    Data Reviewed:     CBC:   Lab Results   Component Value Date/Time    WBC 15.9 (H) 05/21/2021 04:59 AM    HGB 11.6 (L) 05/21/2021 04:59 AM    HCT 35.4 05/21/2021 04:59 AM    PLATELET 599 21/26/2059 04:59 AM    MCV 82.9 05/21/2021 04:59 AM     Results for Bautista Medina (MRN [id number]) as of 1/6/2022 14:06   Ref. Range 4/13/2020 11:05 4/16/2020 03:48 4/17/2020 05:15 4/18/2020 02:05 5/18/2021 15:16   EOSINOPHILS Latest Ref Range: 0 - 5 % 0 10 (H) 0 0 14 (H)   ABS.  EOSINOPHILS Latest Ref Range: 0.0 - 0.4 K/UL 0.0 1.0 (H) 0.0 0.0 1.6 (H)         BMP:   Lab Results   Component Value Date/Time    Sodium 144 05/22/2021 07:08 AM    Potassium 3.6 05/22/2021 07:08 AM    Chloride 109 05/22/2021 07:08 AM    CO2 28 05/22/2021 07:08 AM    Anion gap 7 05/22/2021 07:08 AM    Glucose 83 05/22/2021 07:08 AM    BUN 33 (H) 05/22/2021 07:08 AM    Creatinine 1.12 05/22/2021 07:08 AM    BUN/Creatinine ratio 29 (H) 05/22/2021 07:08 AM    GFR est AA 56 (L) 05/22/2021 07:08 AM    GFR est non-AA 46 (L) 05/22/2021 07:08 AM    Calcium 9.0 05/22/2021 07:08 AM        Imaging:  [x]I have personally reviewed the patients radiographs section   Results from Hospital Encounter encounter on 05/18/21    XR CHEST PORT    Narrative  AP CHEST, PORTABLE    INDICATION: Shortness of breath    COMPARISON: Chest x-ray 12/20/2020    FINDINGS:  EKG leads overlie the patient. Cardiac silhouette is stable. Atherosclerosis noted. Diffuse interstitial  prominence, greater lower lungs. Trace blunting of the costophrenic sulci. No  pneumothorax. No acute osseous abnormalities are identified. Impression  1. Mild interstitial edema versus interstitial infiltrate. 2. Possible trace pleural effusions. 8/3/19                                                                                                   6/30/19      CT     CT Chest with Contract: 9/2/21    Minimal centrilobular emphysematous change. Significant central bronchial wall thickening. Near complete collapse of the right middle lobe. No definite obstructing endobronchial mass or mucous plug is identified. However, without prior comparison, would suggest 1-3 month follow-up chest CT or bronchoscopy. Additional incidentals as above. Signed By: Zack Arellano MD on 9/2/2021 4:48 PM    CTA Chest: 4/12/20          IMPRESSION:     1.  Exam is limited by patient motion. No convincing evidence for pulmonary  embolism. 2.  Mild diffuse bronchial wall thickening suggestive of acute or chronic  bronchitis. 3.  Old granulomatous disease. 4.  Stable left adrenal nodule.   5.  No findings suggestive of viral pneumonia           Dinah Sinclair DO, [facility]  Pulmonary, Sleep and Critical Care Medicine

## 2022-01-06 NOTE — PROGRESS NOTES
Junior Garcia presents today for   Chief Complaint   Patient presents with    COPD       Is someone accompanying this pt? Yes - daughter    Is the patient using any DME equipment during OV? nebulizer    -DME Company N/A    Depression Screening:  3 most recent PHQ Screens 1/6/2022   Little interest or pleasure in doing things Not at all   Feeling down, depressed, irritable, or hopeless Not at all   Total Score PHQ 2 0       Learning Assessment:  Learning Assessment 11/15/2016   PRIMARY LEARNER Patient   BARRIERS PRIMARY LEARNER NONE   PRIMARY LANGUAGE ENGLISH   LEARNER PREFERENCE PRIMARY DEMONSTRATION     LISTENING   ANSWERED BY self   RELATIONSHIP SELF       Abuse Screening:  Abuse Screening Questionnaire 7/11/2019   Do you ever feel afraid of your partner? N   Are you in a relationship with someone who physically or mentally threatens you? N   Is it safe for you to go home? Y       Fall Risk  Fall Risk Assessment, last 12 mths 1/6/2022   Able to walk? Yes   Fall in past 12 months? 0   Do you feel unsteady? 0   Are you worried about falling 0   Number of falls in past 12 months -   Fall with injury? -         Coordination of Care:  1. Have you been to the ER, urgent care clinic since your last visit? Hospitalized since your last visit? Yes; Where: [facility], When: September    2. Have you seen or consulted any other health care providers outside of the [address] [place] [address] since your last visit?  Include any pap smears or colon screening. n/a

## 2022-01-06 NOTE — LETTER
1/6/2022    Patient: Mahogany Silverman   YOB: 1934   Date of Visit: 1/6/2022     Stevie Pacheco NP  [address] [facility] [address] [id number]  Via Fax: [phone]    Dear Stevie Pacheco NP,      Thank you for referring Ms. Rose Marie Garrison to [address] [place] [address] for evaluation. My notes for this consultation are attached. If you have questions, please do not hesitate to call me. I look forward to following your patient along with you.       Sincerely,    Sam Hauser, DO

## 2022-01-27 ENCOUNTER — HOSPITAL ENCOUNTER (OUTPATIENT)
Dept: MAMMOGRAPHY | Age: 87
Discharge: HOME | End: 2022-01-27
Attending: NURSE PRACTITIONER
Payer: MEDICARE

## 2022-01-27 DIAGNOSIS — Z12.31 VISIT FOR SCREENING MAMMOGRAM: ICD-10-CM

## 2022-01-27 PROCEDURE — 77063 BREAST TOMOSYNTHESIS BI: CPT

## 2022-03-18 PROBLEM — R09.02 HYPOXIA: Status: ACTIVE | Noted: 2019-06-30

## 2022-03-18 PROBLEM — Z20.822 SUSPECTED COVID-19 VIRUS INFECTION: Status: ACTIVE | Noted: 2020-04-13

## 2022-03-18 PROBLEM — J44.1 COPD EXACERBATION (HCC): Status: ACTIVE | Noted: 2020-04-12

## 2022-03-19 PROBLEM — J44.1 COPD WITH ACUTE EXACERBATION (HCC): Status: ACTIVE | Noted: 2021-05-18

## 2022-03-19 PROBLEM — J96.90 RESPIRATORY FAILURE (HCC): Status: ACTIVE | Noted: 2020-04-17

## 2022-03-19 PROBLEM — I10 ESSENTIAL HYPERTENSION: Status: ACTIVE | Noted: 2019-06-30

## 2022-03-19 PROBLEM — E11.9 CONTROLLED TYPE 2 DIABETES MELLITUS, WITHOUT LONG-TERM CURRENT USE OF INSULIN (HCC): Status: ACTIVE | Noted: 2019-06-30

## 2022-04-27 ENCOUNTER — HOSPITAL ENCOUNTER (OUTPATIENT)
Dept: CT IMAGING | Age: 87
Discharge: HOME | End: 2022-04-27
Attending: INTERNAL MEDICINE
Payer: MEDICARE

## 2022-04-27 DIAGNOSIS — J42 CHRONIC BRONCHITIS, UNSPECIFIED CHRONIC BRONCHITIS TYPE (HCC): ICD-10-CM

## 2022-04-27 DIAGNOSIS — R93.89 ABNORMAL CT OF THE CHEST: ICD-10-CM

## 2022-04-27 DIAGNOSIS — J44.9 ASTHMA-COPD OVERLAP SYNDROME (HCC): ICD-10-CM

## 2022-04-27 DIAGNOSIS — R06.09 DYSPNEA ON EXERTION: ICD-10-CM

## 2022-04-27 LAB — CREAT UR-MCNC: 1 MG/DL (ref 0.6–1.3)

## 2022-04-27 PROCEDURE — 74011000636 HC RX REV CODE- 636: Performed by: INTERNAL MEDICINE

## 2022-04-27 PROCEDURE — 71260 CT THORAX DX C+: CPT

## 2022-04-27 PROCEDURE — 82565 ASSAY OF CREATININE: CPT

## 2022-04-27 RX ADMIN — IOPAMIDOL 80 ML: 612 INJECTION, SOLUTION INTRAVENOUS at 14:47

## 2023-01-12 ENCOUNTER — TRANSCRIBE ORDER (OUTPATIENT)
Dept: SCHEDULING | Age: 88
End: 2023-01-12

## 2023-01-12 DIAGNOSIS — Z12.31 VISIT FOR SCREENING MAMMOGRAM: Primary | ICD-10-CM

## 2023-02-06 ENCOUNTER — HOSPITAL ENCOUNTER (OUTPATIENT)
Dept: MAMMOGRAPHY | Age: 88
Discharge: HOME | End: 2023-02-06
Attending: NURSE PRACTITIONER
Payer: MEDICARE

## 2023-02-06 DIAGNOSIS — Z12.31 ENCOUNTER FOR SCREENING MAMMOGRAM FOR BREAST CANCER: ICD-10-CM

## 2023-02-06 PROCEDURE — 77063 BREAST TOMOSYNTHESIS BI: CPT

## 2023-03-03 ENCOUNTER — APPOINTMENT (OUTPATIENT)
Facility: HOSPITAL | Age: 88
DRG: 177 | End: 2023-03-03
Payer: MEDICARE

## 2023-03-03 ENCOUNTER — HOSPITAL ENCOUNTER (INPATIENT)
Facility: HOSPITAL | Age: 88
LOS: 2 days | Discharge: HOME | DRG: 177 | End: 2023-03-05
Attending: EMERGENCY MEDICINE | Admitting: HOSPITALIST
Payer: MEDICARE

## 2023-03-03 DIAGNOSIS — U07.1 COVID-19: Primary | ICD-10-CM

## 2023-03-03 DIAGNOSIS — J18.9 PNEUMONIA DUE TO INFECTIOUS ORGANISM, UNSPECIFIED LATERALITY, UNSPECIFIED PART OF LUNG: ICD-10-CM

## 2023-03-03 PROBLEM — J12.82 PNEUMONIA DUE TO COVID-19 VIRUS: Status: ACTIVE | Noted: 2023-03-03

## 2023-03-03 LAB
ALBUMIN SERPL-MCNC: 3.8 G/DL (ref 3.4–5)
ALBUMIN/GLOB SERPL: 0.9 (ref 0.8–1.7)
ALP SERPL-CCNC: 124 U/L (ref 45–117)
ALT SERPL-CCNC: 18 U/L (ref 13–56)
ANION GAP SERPL CALC-SCNC: 8 MMOL/L (ref 3–18)
APPEARANCE UR: CLEAR
AST SERPL-CCNC: 27 U/L (ref 10–38)
BASOPHILS # BLD: 0 K/UL (ref 0–0.1)
BASOPHILS NFR BLD: 0 % (ref 0–2)
BILIRUB SERPL-MCNC: 0.5 MG/DL (ref 0.2–1)
BILIRUB UR QL: NEGATIVE
BUN SERPL-MCNC: 24 MG/DL (ref 7–18)
BUN/CREAT SERPL: 22 (ref 12–20)
CALCIUM SERPL-MCNC: 9.4 MG/DL (ref 8.5–10.1)
CHLORIDE SERPL-SCNC: 104 MMOL/L (ref 100–111)
CO2 SERPL-SCNC: 25 MMOL/L (ref 21–32)
COLOR UR: YELLOW
CREAT SERPL-MCNC: 1.07 MG/DL (ref 0.6–1.3)
DIFFERENTIAL METHOD BLD: ABNORMAL
EKG ATRIAL RATE: 87 BPM
EKG DIAGNOSIS: NORMAL
EKG P AXIS: 78 DEGREES
EKG P-R INTERVAL: 212 MS
EKG Q-T INTERVAL: 366 MS
EKG QRS DURATION: 74 MS
EKG QTC CALCULATION (BAZETT): 440 MS
EKG R AXIS: 51 DEGREES
EKG T AXIS: 53 DEGREES
EKG VENTRICULAR RATE: 87 BPM
EOSINOPHIL # BLD: 0.5 K/UL (ref 0–0.4)
EOSINOPHIL NFR BLD: 4 % (ref 0–5)
ERYTHROCYTE [DISTWIDTH] IN BLOOD BY AUTOMATED COUNT: 13.5 % (ref 11.6–14.5)
EST. AVERAGE GLUCOSE BLD GHB EST-MCNC: 154 MG/DL
FLUAV AG NPH QL IA: NEGATIVE
FLUBV AG NOSE QL IA: NEGATIVE
GLOBULIN SER CALC-MCNC: 4.3 G/DL (ref 2–4)
GLUCOSE BLD STRIP.AUTO-MCNC: 133 MG/DL (ref 70–110)
GLUCOSE BLD STRIP.AUTO-MCNC: 189 MG/DL (ref 70–110)
GLUCOSE SERPL-MCNC: 172 MG/DL (ref 74–99)
GLUCOSE UR STRIP.AUTO-MCNC: NEGATIVE MG/DL
HBA1C MFR BLD: 7 % (ref 4.2–5.6)
HCT VFR BLD AUTO: 39 % (ref 35–45)
HGB BLD-MCNC: 13 G/DL (ref 12–16)
HGB UR QL STRIP: NEGATIVE
IMM GRANULOCYTES # BLD AUTO: 0.1 K/UL (ref 0–0.04)
IMM GRANULOCYTES NFR BLD AUTO: 1 % (ref 0–0.5)
KETONES UR QL STRIP.AUTO: NEGATIVE MG/DL
LEUKOCYTE ESTERASE UR QL STRIP.AUTO: NEGATIVE
LYMPHOCYTES # BLD: 3.9 K/UL (ref 0.9–3.6)
LYMPHOCYTES NFR BLD: 31 % (ref 21–52)
MCH RBC QN AUTO: 27.6 PG (ref 24–34)
MCHC RBC AUTO-ENTMCNC: 33.3 G/DL (ref 31–37)
MCV RBC AUTO: 82.8 FL (ref 78–100)
MONOCYTES # BLD: 0.9 K/UL (ref 0.05–1.2)
MONOCYTES NFR BLD: 7 % (ref 3–10)
NEUTS SEG # BLD: 7.3 K/UL (ref 1.8–8)
NEUTS SEG NFR BLD: 58 % (ref 40–73)
NITRITE UR QL STRIP.AUTO: NEGATIVE
NRBC # BLD: 0 K/UL (ref 0–0.01)
NRBC BLD-RTO: 0 PER 100 WBC
PH UR STRIP: 6 (ref 5–8)
PLATELET # BLD AUTO: 273 K/UL (ref 135–420)
PMV BLD AUTO: 10.3 FL (ref 9.2–11.8)
POTASSIUM SERPL-SCNC: 4 MMOL/L (ref 3.5–5.5)
PROT SERPL-MCNC: 8.1 G/DL (ref 6.4–8.2)
PROT UR STRIP-MCNC: NEGATIVE MG/DL
RBC # BLD AUTO: 4.71 M/UL (ref 4.2–5.3)
SARS-COV-2 RDRP RESP QL NAA+PROBE: DETECTED
SODIUM SERPL-SCNC: 137 MMOL/L (ref 136–145)
SOURCE: ABNORMAL
SP GR UR REFRACTOMETRY: 1.01 (ref 1–1.03)
TROPONIN I SERPL HS-MCNC: 7 NG/L (ref 0–54)
UROBILINOGEN UR QL STRIP.AUTO: 0.2 EU/DL (ref 0.2–1)
WBC # BLD AUTO: 12.7 K/UL (ref 4.6–13.2)

## 2023-03-03 PROCEDURE — 83036 HEMOGLOBIN GLYCOSYLATED A1C: CPT

## 2023-03-03 PROCEDURE — 36415 COLL VENOUS BLD VENIPUNCTURE: CPT

## 2023-03-03 PROCEDURE — 6360000002 HC RX W HCPCS: Performed by: EMERGENCY MEDICINE

## 2023-03-03 PROCEDURE — 87040 BLOOD CULTURE FOR BACTERIA: CPT

## 2023-03-03 PROCEDURE — 80053 COMPREHEN METABOLIC PANEL: CPT

## 2023-03-03 PROCEDURE — 6370000000 HC RX 637 (ALT 250 FOR IP): Performed by: EMERGENCY MEDICINE

## 2023-03-03 PROCEDURE — 87804 INFLUENZA ASSAY W/OPTIC: CPT

## 2023-03-03 PROCEDURE — 6370000000 HC RX 637 (ALT 250 FOR IP): Performed by: HOSPITALIST

## 2023-03-03 PROCEDURE — 94761 N-INVAS EAR/PLS OXIMETRY MLT: CPT

## 2023-03-03 PROCEDURE — 87635 SARS-COV-2 COVID-19 AMP PRB: CPT

## 2023-03-03 PROCEDURE — 99222 1ST HOSP IP/OBS MODERATE 55: CPT | Performed by: HOSPITALIST

## 2023-03-03 PROCEDURE — 1100000003 HC PRIVATE W/ TELEMETRY

## 2023-03-03 PROCEDURE — 93005 ELECTROCARDIOGRAM TRACING: CPT | Performed by: EMERGENCY MEDICINE

## 2023-03-03 PROCEDURE — 6360000002 HC RX W HCPCS: Performed by: HOSPITALIST

## 2023-03-03 PROCEDURE — 81003 URINALYSIS AUTO W/O SCOPE: CPT

## 2023-03-03 PROCEDURE — 2700000000 HC OXYGEN THERAPY PER DAY

## 2023-03-03 PROCEDURE — 71045 X-RAY EXAM CHEST 1 VIEW: CPT

## 2023-03-03 PROCEDURE — 99285 EMERGENCY DEPT VISIT HI MDM: CPT

## 2023-03-03 PROCEDURE — 84484 ASSAY OF TROPONIN QUANT: CPT

## 2023-03-03 PROCEDURE — 94640 AIRWAY INHALATION TREATMENT: CPT

## 2023-03-03 PROCEDURE — 82962 GLUCOSE BLOOD TEST: CPT

## 2023-03-03 PROCEDURE — 2580000003 HC RX 258: Performed by: EMERGENCY MEDICINE

## 2023-03-03 PROCEDURE — 85025 COMPLETE CBC W/AUTO DIFF WBC: CPT

## 2023-03-03 RX ORDER — ENOXAPARIN SODIUM 100 MG/ML
40 INJECTION SUBCUTANEOUS DAILY
Status: DISCONTINUED | OUTPATIENT
Start: 2023-03-03 | End: 2023-03-05 | Stop reason: HOSPADM

## 2023-03-03 RX ORDER — POLYETHYLENE GLYCOL 3350 17 G/17G
17 POWDER, FOR SOLUTION ORAL DAILY PRN
Status: DISCONTINUED | OUTPATIENT
Start: 2023-03-03 | End: 2023-03-05 | Stop reason: HOSPADM

## 2023-03-03 RX ORDER — NIFEDIPINE 30 MG/1
60 TABLET, EXTENDED RELEASE ORAL DAILY
Status: DISCONTINUED | OUTPATIENT
Start: 2023-03-03 | End: 2023-03-05 | Stop reason: HOSPADM

## 2023-03-03 RX ORDER — ONDANSETRON 2 MG/ML
4 INJECTION INTRAMUSCULAR; INTRAVENOUS EVERY 6 HOURS PRN
Status: DISCONTINUED | OUTPATIENT
Start: 2023-03-03 | End: 2023-03-05 | Stop reason: HOSPADM

## 2023-03-03 RX ORDER — PRAVASTATIN SODIUM 20 MG
40 TABLET ORAL DAILY
Status: DISCONTINUED | OUTPATIENT
Start: 2023-03-03 | End: 2023-03-05 | Stop reason: HOSPADM

## 2023-03-03 RX ORDER — LISINOPRIL 40 MG/1
40 TABLET ORAL DAILY
Status: DISCONTINUED | OUTPATIENT
Start: 2023-03-03 | End: 2023-03-05 | Stop reason: HOSPADM

## 2023-03-03 RX ORDER — INSULIN LISPRO 100 [IU]/ML
0-4 INJECTION, SOLUTION INTRAVENOUS; SUBCUTANEOUS
Status: DISCONTINUED | OUTPATIENT
Start: 2023-03-03 | End: 2023-03-05 | Stop reason: HOSPADM

## 2023-03-03 RX ORDER — INSULIN LISPRO 100 [IU]/ML
0-4 INJECTION, SOLUTION INTRAVENOUS; SUBCUTANEOUS NIGHTLY
Status: DISCONTINUED | OUTPATIENT
Start: 2023-03-03 | End: 2023-03-05 | Stop reason: HOSPADM

## 2023-03-03 RX ORDER — BUDESONIDE AND FORMOTEROL FUMARATE DIHYDRATE 160; 4.5 UG/1; UG/1
2 AEROSOL RESPIRATORY (INHALATION) 2 TIMES DAILY
Status: DISCONTINUED | OUTPATIENT
Start: 2023-03-03 | End: 2023-03-05 | Stop reason: HOSPADM

## 2023-03-03 RX ORDER — DEXTROSE MONOHYDRATE 100 MG/ML
INJECTION, SOLUTION INTRAVENOUS CONTINUOUS PRN
Status: DISCONTINUED | OUTPATIENT
Start: 2023-03-03 | End: 2023-03-05 | Stop reason: HOSPADM

## 2023-03-03 RX ORDER — IPRATROPIUM BROMIDE AND ALBUTEROL SULFATE 2.5; .5 MG/3ML; MG/3ML
1 SOLUTION RESPIRATORY (INHALATION)
Status: COMPLETED | OUTPATIENT
Start: 2023-03-03 | End: 2023-03-03

## 2023-03-03 RX ORDER — CLONIDINE HYDROCHLORIDE 0.1 MG/1
0.2 TABLET ORAL 2 TIMES DAILY
Status: DISCONTINUED | OUTPATIENT
Start: 2023-03-03 | End: 2023-03-05 | Stop reason: HOSPADM

## 2023-03-03 RX ORDER — ASPIRIN 81 MG/1
81 TABLET, CHEWABLE ORAL DAILY
Status: DISCONTINUED | OUTPATIENT
Start: 2023-03-03 | End: 2023-03-05 | Stop reason: HOSPADM

## 2023-03-03 RX ORDER — ONDANSETRON 4 MG/1
4 TABLET, ORALLY DISINTEGRATING ORAL EVERY 8 HOURS PRN
Status: DISCONTINUED | OUTPATIENT
Start: 2023-03-03 | End: 2023-03-05 | Stop reason: HOSPADM

## 2023-03-03 RX ORDER — ACETAMINOPHEN 325 MG/1
650 TABLET ORAL EVERY 6 HOURS PRN
Status: DISCONTINUED | OUTPATIENT
Start: 2023-03-03 | End: 2023-03-05 | Stop reason: HOSPADM

## 2023-03-03 RX ORDER — IPRATROPIUM BROMIDE AND ALBUTEROL SULFATE 2.5; .5 MG/3ML; MG/3ML
3 SOLUTION RESPIRATORY (INHALATION) EVERY 4 HOURS PRN
Status: DISCONTINUED | OUTPATIENT
Start: 2023-03-03 | End: 2023-03-05 | Stop reason: HOSPADM

## 2023-03-03 RX ORDER — NIFEDIPINE 60 MG/1
60 TABLET, EXTENDED RELEASE ORAL DAILY
Status: DISCONTINUED | OUTPATIENT
Start: 2023-03-03 | End: 2023-03-03

## 2023-03-03 RX ADMIN — LISINOPRIL 40 MG: 40 TABLET ORAL at 12:41

## 2023-03-03 RX ADMIN — NIFEDIPINE 60 MG: 30 TABLET, FILM COATED, EXTENDED RELEASE ORAL at 18:13

## 2023-03-03 RX ADMIN — PRAVASTATIN SODIUM 40 MG: 20 TABLET ORAL at 12:41

## 2023-03-03 RX ADMIN — CLONIDINE HYDROCHLORIDE 0.2 MG: 0.1 TABLET ORAL at 12:41

## 2023-03-03 RX ADMIN — AZITHROMYCIN MONOHYDRATE 500 MG: 500 INJECTION, POWDER, LYOPHILIZED, FOR SOLUTION INTRAVENOUS at 07:42

## 2023-03-03 RX ADMIN — ENOXAPARIN SODIUM 40 MG: 100 INJECTION SUBCUTANEOUS at 12:41

## 2023-03-03 RX ADMIN — CLONIDINE HYDROCHLORIDE 0.2 MG: 0.1 TABLET ORAL at 20:41

## 2023-03-03 RX ADMIN — IPRATROPIUM BROMIDE AND ALBUTEROL SULFATE 1 AMPULE: 2.5; .5 SOLUTION RESPIRATORY (INHALATION) at 01:45

## 2023-03-03 RX ADMIN — ASPIRIN 81 MG CHEWABLE TABLET 81 MG: 81 TABLET CHEWABLE at 12:41

## 2023-03-03 RX ADMIN — CEFTRIAXONE 1000 MG: 1 INJECTION, POWDER, FOR SOLUTION INTRAMUSCULAR; INTRAVENOUS at 07:42

## 2023-03-03 RX ADMIN — BUDESONIDE AND FORMOTEROL FUMARATE DIHYDRATE 2 PUFF: 160; 4.5 AEROSOL RESPIRATORY (INHALATION) at 15:21

## 2023-03-03 RX ADMIN — TIOTROPIUM BROMIDE INHALATION SPRAY 2 PUFF: 3.12 SPRAY, METERED RESPIRATORY (INHALATION) at 15:21

## 2023-03-03 RX ADMIN — BUDESONIDE AND FORMOTEROL FUMARATE DIHYDRATE 2 PUFF: 160; 4.5 AEROSOL RESPIRATORY (INHALATION) at 20:41

## 2023-03-03 ASSESSMENT — ENCOUNTER SYMPTOMS
NAUSEA: 1
SHORTNESS OF BREATH: 0
TROUBLE SWALLOWING: 0
APNEA: 0
SORE THROAT: 0
RHINORRHEA: 0

## 2023-03-03 ASSESSMENT — PAIN - FUNCTIONAL ASSESSMENT: PAIN_FUNCTIONAL_ASSESSMENT: NONE - DENIES PAIN

## 2023-03-03 NOTE — H&P
[address]         HISTORY & PHYSICAL      Patient: Niranjan Schmidt MRN: [id number]  [facility]: [id number]    YOB: 1934  Age: 80 y.o. Sex: female    DOA: 3/3/2023 LOS:  LOS: 0 days        DOA: 3/3/2023        Assessment/Plan     Principal Problem:    Pneumonia due to COVID-19 virus  Resolved Problems:    * No resolved hospital problems. *      Patient Active Problem List   Diagnosis    COPD exacerbation ([address]    Hypoxia    Suspected COVID-19 virus infection    COPD with acute exacerbation ([facility]    Respiratory failure ([facility] [facility]    Controlled type 2 diabetes mellitus, without long-term current use of insulin ([facility] [address]    Essential hypertension    Pneumonia due to COVID-19 virus         Plan:  Pneumonia due to COVID-19 infection-IV antibiotics, patient is currently not symptomatic from [facility]. We will continue to monitor. Shortness of breath likely multifactorial, related to COVID-19 infection, pneumonia, COPD-continue to monitor, patient is currently on 2 L nasal cannula in the hospital.  She is unable to confirm if she is on oxygen at home. History of COPD-continue Trelegy and bronchodilators  History of hypertension-resume home medications, continue to monitor blood pressure  History of type 2 diabetes-check A1c, insulin sliding scale  DVT prophylaxis-Lovenox  Full code          History of Presenting Illness:    57-year-old female with a history of hypertension, hyperlipidemia, COPD presents to the emergency room secondary to shortness of breath and fall. Patient mentions she has had 3 falls recently and has been unsteady on her feet. She also mentions that she she walked a small distance and became short of breath more than usual, so presented to the emergency room for further evaluation. She is unclear if she is on any supplemental oxygen at home. ER evaluation patient tested positive for COVID-19 infection, chest x-ray concerning for possible infiltrate. Patient started on broad-spectrum IV antibiotics, placed on supplemental oxygen and admitted to the hospital for further evaluation. Past Medical History:   Diagnosis Date    Bronchitis     Diabetes ([address]     Hyperlipemia     Hypertension     Pneumonia Jun-Jul 2019       Past Surgical History:   Procedure Laterality Date    CATARACT REMOVAL      COLONOSCOPY N/A 6/7/2017    COLONOSCOPY / polypectomy performed by Virgli Zurita MD at [facility] ENDOSCOPY    COLONOSCOPY      2011       Family History   Problem Relation Age of Onset    Heart Disease Mother     Diabetes Father     Breast Cancer Daughter        Social History     Socioeconomic History    Marital status:    Tobacco Use    Smoking status: Passive Smoke Exposure - Never Smoker    Smokeless tobacco: Never   Substance and Sexual Activity    Alcohol use: No    Drug use: No       Prior to Admission medications    Medication Sig Start Date End Date Taking?  Authorizing Provider   acetylcysteine (MUCOMYST) 20 % nebulizer solution Inhale 3 mLs into the lungs 9/8/21   Ar Automatic Reconciliation   albuterol sulfate HFA (PROVENTIL;VENTOLIN;PROAIR) 108 (90 Base) MCG/ACT inhaler Inhale 2 puffs into the lungs every 4 hours as needed 12/20/20   Ar Automatic Reconciliation   aspirin 81 MG chewable tablet Take 81 mg by mouth daily    Ar Automatic Reconciliation   vitamin D 25 MCG (1000 UT) CAPS Take 1,000 Units by mouth daily    Ar Automatic Reconciliation   cloNIDine (CATAPRES) 0.2 MG tablet Take 0.2 mg by mouth 2 times daily    Ar Automatic Reconciliation   fluticasone (FLONASE) 50 MCG/ACT nasal spray [The details of the medication are not available because there are pending changes by a home health clinician.] 4/18/20   Ar Automatic Reconciliation   fluticasone-umeclidin-vilant (TRELEGY ELLIPTA) 100-62.5-25 MCG/ACT AEPB inhaler Inhale 1 puff into the lungs daily 9/8/21   Ar Automatic Reconciliation   glimepiride (AMARYL) 4 MG tablet Take 2 mg by mouth daily    Ar Automatic Reconciliation   ipratropium-albuterol (DUONEB) 0.5-2.5 (3) MG/3ML SOLN nebulizer solution Inhale 3 mLs into the lungs every 4 hours as needed 4/17/20   Ar Automatic Reconciliation   lisinopril (PRINIVIL;ZESTRIL) 20 MG tablet Take 40 mg by mouth daily    Ar Automatic Reconciliation   Metoprolol Succinate 200 MG CS24 Take 1 tablet by mouth daily    Ar Automatic Reconciliation   NIFEdipine (PROCARDIA XL) 60 MG extended release tablet Take 60 mg by mouth daily 9/9/21   Ar Automatic Reconciliation   pravastatin (PRAVACHOL) 40 MG tablet Take 40 mg by mouth daily    Ar Automatic Reconciliation   verapamil (CALAN SR) 180 MG extended release tablet Take 180 mg by mouth daily    Ar Automatic Reconciliation       No Known Allergies    Review of Systems:    Pertinent Positives noted in HPI.  Rest all other ROS were noted to be negative.                  Physical Exam:      Visit Vitals  BP (!) 152/66   Pulse 74   Temp 97.8 °F (36.6 °C) (Oral)   Resp 17   SpO2 100%       Physical Exam:    Gen: In general, this is a well nourished female in no acute distress  HEENT: Sclerae nonicteric. Oral mucous membranes moist. Dentition poor  Neck: Supple with midline trachea.   CV: RRR without murmur or rub appreciated.   Resp:Respirations are unlabored without use of accessory muscles. Lung fields B/L without wheezes or rhonchi.   Abd: Soft, nontender, nondistended.  Extrem: Extremities are warm, without cyanosis or clubbing.  No pitting pretibial edema.   Skin: Warm, no visible rashes.  Neuro: Patient is alert, oriented, and cooperative. No obvious focal defects. Moves all 4 extremities.    Labs Reviewed:    Recent Results (from the past 24 hour(s))   CBC with Auto Differential    Collection Time: 03/03/23  1:20 AM   Result Value Ref Range    WBC 12.7 4.6 - 13.2 K/uL    RBC 4.71 4.20 - 5.30 M/uL    Hemoglobin 13.0 12.0 - 16.0 g/dL    Hematocrit 39.0 35.0 - 45.0 %    MCV 82.8 78.0 - 100.0 FL    MCH 27.6 24.0 - 34.0 PG    MCHC  33.3 31.0 - 37.0 g/dL    RDW 13.5 11.6 - 14.5 %    Platelets 626 395 - 963 K/uL    MPV 10.3 9.2 - 11.8 FL    Nucleated RBCs 0.0 0  WBC    nRBC 0.00 0.00 - 0.01 K/uL    Seg Neutrophils 58 40 - 73 %    Lymphocytes 31 21 - 52 %    Monocytes 7 3 - 10 %    Eosinophils % 4 0 - 5 %    Basophils 0 0 - 2 %    Immature Granulocytes 1 (H) 0.0 - 0.5 %    Segs Absolute 7.3 1.8 - 8.0 K/UL    Absolute Lymph # 3.9 (H) 0.9 - 3.6 K/UL    Absolute Mono # 0.9 0.05 - 1.2 K/UL    Absolute Eos # 0.5 (H) 0.0 - 0.4 K/UL    Basophils Absolute 0.0 0.0 - 0.1 K/UL    Absolute Immature Granulocyte 0.1 (H) 0.00 - 0.04 K/UL    Differential Type AUTOMATED     Comprehensive Metabolic Panel    Collection Time: 03/03/23  1:20 AM   Result Value Ref Range    Sodium 137 136 - 145 mmol/L    Potassium 4.0 3.5 - 5.5 mmol/L    Chloride 104 100 - 111 mmol/L    CO2 25 21 - 32 mmol/L    Anion Gap 8 3.0 - 18 mmol/L    Glucose 172 (H) 74 - 99 mg/dL    BUN 24 (H) 7.0 - 18 MG/DL    Creatinine 1.07 0.6 - 1.3 MG/DL    Bun/Cre Ratio 22 (H) 12 - 20      Est, Glom Filt Rate 50 (L) >60 ml/min/1.73m2    Calcium 9.4 8.5 - 10.1 MG/DL    Total Bilirubin 0.5 0.2 - 1.0 MG/DL    ALT 18 13 - 56 U/L    AST 27 10 - 38 U/L    Alk Phosphatase 124 (H) 45 - 117 U/L    Total Protein 8.1 6.4 - 8.2 g/dL    Albumin 3.8 3.4 - 5.0 g/dL    Globulin 4.3 (H) 2.0 - 4.0 g/dL    Albumin/Globulin Ratio 0.9 0.8 - 1.7     Troponin    Collection Time: 03/03/23  1:20 AM   Result Value Ref Range    Troponin, High Sensitivity 7 0 - 54 ng/L   COVID-19, Rapid    Collection Time: 03/03/23  1:24 AM    Specimen: Nasopharyngeal   Result Value Ref Range    Source Nasopharyngeal      SARS-CoV-2, Rapid Detected (AA) NOTD     Rapid influenza A/B antigens    Collection Time: 03/03/23  1:24 AM    Specimen: Nasopharyngeal   Result Value Ref Range    Influenza A Ag Negative NEG      Influenza B Ag Negative NEG     EKG 12 Lead    Collection Time: 03/03/23  1:28 AM   Result Value Ref Range    Ventricular Rate 87 BPM    Atrial Rate 87 BPM    P-R Interval 212 ms    QRS Duration 74 ms    Q-T Interval 366 ms    QTc Calculation (Bazett) 440 ms    P Axis 78 degrees    R Axis 51 degrees    T Axis 53 degrees    Diagnosis Sinus rhythm with 1st degree AV block    Urinalysis    Collection Time: 03/03/23  6:45 AM   Result Value Ref Range    Color, UA YELLOW      Appearance CLEAR      Specific Gravity, UA 1.008 1.005 - 1.030      pH, Urine 6.0 5.0 - 8.0      Protein, UA Negative NEG mg/dL    Glucose, UA Negative NEG mg/dL    Ketones, Urine Negative NEG mg/dL    Bilirubin Urine Negative NEG      Blood, Urine Negative NEG      Urobilinogen, Urine 0.2 0.2 - 1.0 EU/dL    Nitrite, Urine Negative NEG      Leukocyte Esterase, Urine Negative NEG         Imaging Reviewed:    XR CHEST PORTABLE    Result Date: 3/3/2023  No definite acute findings. Left midlung and infrahilar likely streaky atelectasis/scarring but possibly infiltrate. Calin Putnam MD  3/3/2023, 11:44 AM        Disclaimer: Sections of this note are dictated using utilizing voice recognition software. Minor typographical errors may be present. If questions arise, please do not hesitate to contact me or call our department.

## 2023-03-03 NOTE — ED TRIAGE NOTES
Patient A/O x 4, brought into ED via medic in respiratory distress. As per patient she became SOB tonight after talking with daughter for an extended period of time on phone. Patient's O2 saturation was 80% on RA. Patient received albuterol x 2, atrovent x 1, solumedrol 125 mg IV via PIV to left forearm.

## 2023-03-03 NOTE — ED NOTES
Pt with room assigned at [facility]. Report called to Mahesh. Pt to be transported via EMS to [facility], 2S.      Bridgette Canales RN  03/03/23 5880

## 2023-03-03 NOTE — ED NOTES
Patient placed on 2L O2 via NC; O2 saturation 98%. Patient is very talkative, speaking in clear sentences.      Louis Somers RN  03/03/23 0268

## 2023-03-03 NOTE — ED NOTES
Patient continues to rest comfortably on stretcher. No acute distress noted.      Lynda Sicard, RN  03/03/23 8425

## 2023-03-03 NOTE — ED PROVIDER NOTES
`[facility] EMERGENCY DEPT  eMERGENCY dEPARTMENT eNCOUnter      Pt Name: Antelmo Juarez  MRN: [id number]  Elizabethgfmicheline 2/16/1934 of evaluation: 3/3/2023  Provider:Agapito Pond, [facility]       Chief Complaint   Patient presents with    Respiratory Distress        HPI  Antelmo Juarez is a 80 y.o. female who lives in assistant living developed a sudden onset of severe shortness of breath. Paramedics were called and transported patient to the emergency room. Upon arrival patient was in severe respiratory distress use accessory muscle diffuse wheezing. ROS    Review of Systems   Constitutional:  Positive for activity change. Negative for appetite change. HENT:  Negative for congestion, rhinorrhea, sore throat and trouble swallowing. Eyes:  Negative for visual disturbance. Respiratory:  Negative for apnea and shortness of breath. Cardiovascular:  Negative for chest pain. Gastrointestinal:  Positive for nausea. Genitourinary:  Negative for frequency. Musculoskeletal:  Negative for arthralgias and myalgias. Neurological:  Negative for dizziness. Psychiatric/Behavioral:  Negative for agitation. Except as noted above the remainder of the review of systems was reviewed and negative.        PAST MEDICAL HISTORY     Past Medical History:   Diagnosis Date    Bronchitis     Diabetes ([address]     Hyperlipemia     Hypertension     Pneumonia Jun-Jul 2019         SURGICAL HISTORY       Past Surgical History:   Procedure Laterality Date    CATARACT REMOVAL      COLONOSCOPY N/A 6/7/2017    COLONOSCOPY / polypectomy performed by Augustus Paz MD at [facility] ENDOSCOPY    COLONOSCOPY      2011         CURRENTMEDICATIONS       Previous Medications    ACETYLCYSTEINE (MUCOMYST) 20 % NEBULIZER SOLUTION    Inhale 3 mLs into the lungs    ALBUTEROL SULFATE HFA (PROVENTIL;VENTOLIN;PROAIR) 108 (90 BASE) MCG/ACT INHALER    Inhale 2 puffs into the lungs every 4 hours as needed    ASPIRIN 81 MG CHEWABLE TABLET    Take 81 mg by mouth daily    CLONIDINE (CATAPRES) 0.2 MG TABLET    Take 0.2 mg by mouth 2 times daily    FLUTICASONE (FLONASE) 50 MCG/ACT NASAL SPRAY    [The details of the medication are not available because there are pending changes by a home health clinician.]    FLUTICASONE-UMECLIDIN-VILANT (TRELEGY ELLIPTA) 100-62.5-25 MCG/ACT AEPB INHALER    Inhale 1 puff into the lungs daily    GLIMEPIRIDE (AMARYL) 4 MG TABLET    Take 2 mg by mouth daily    IPRATROPIUM-ALBUTEROL (DUONEB) 0.5-2.5 (3) MG/3ML SOLN NEBULIZER SOLUTION    Inhale 3 mLs into the lungs every 4 hours as needed    LISINOPRIL (PRINIVIL;ZESTRIL) 20 MG TABLET    Take 40 mg by mouth daily    METOPROLOL SUCCINATE 200 MG CS24    Take 1 tablet by mouth daily    NIFEDIPINE (PROCARDIA XL) 60 MG EXTENDED RELEASE TABLET    Take 60 mg by mouth daily    PRAVASTATIN (PRAVACHOL) 40 MG TABLET    Take 40 mg by mouth daily    VERAPAMIL (CALAN SR) 180 MG EXTENDED RELEASE TABLET    Take 180 mg by mouth daily    VITAMIN D 25 MCG (1000 UT) CAPS    Take 1,000 Units by mouth daily       ALLERGIES     Patient has no known allergies. FAMILY HISTORY       Family History   Problem Relation Age of Onset    Heart Disease Mother     Diabetes Father     Breast Cancer Daughter           SOCIAL HISTORY       Social History     Socioeconomic History    Marital status:    Tobacco Use    Smoking status: Passive Smoke Exposure - Never Smoker    Smokeless tobacco: Never   Substance and Sexual Activity    Alcohol use: No    Drug use: No         PHYSICAL EXAM       ED Triage Vitals   BP Temp Temp Source Heart Rate Resp SpO2 Height Weight   03/03/23 0115 03/03/23 0115 03/03/23 0115 03/03/23 0115 03/03/23 0115 03/03/23 0330 -- --   (!) 177/84 97.5 °F (36.4 °C) Oral 100 14 100 %         Physical Exam  Constitutional:       General: She is in acute distress. Appearance: She is ill-appearing. HENT:      Head: Normocephalic.       Nose: Nose normal.   Eyes: Pupils: Pupils are equal, round, and reactive to light. Cardiovascular:      Rate and Rhythm: Normal rate. Pulmonary:      Effort: Respiratory distress present. Breath sounds: Wheezing present. Abdominal:      General: Abdomen is flat. Palpations: Abdomen is soft. Musculoskeletal:         General: Normal range of motion. Cervical back: Neck supple. Skin:     Findings: No rash. Neurological:      General: No focal deficit present. Mental Status: She is alert.        Recent Results (from the past 24 hour(s))   CBC with Auto Differential    Collection Time: 03/03/23  1:20 AM   Result Value Ref Range    WBC 12.7 4.6 - 13.2 K/uL    RBC 4.71 4.20 - 5.30 M/uL    Hemoglobin 13.0 12.0 - 16.0 g/dL    Hematocrit 39.0 35.0 - 45.0 %    MCV 82.8 78.0 - 100.0 FL    MCH 27.6 24.0 - 34.0 PG    MCHC 33.3 31.0 - 37.0 g/dL    RDW 13.5 11.6 - 14.5 %    Platelets 035 328 - 543 K/uL    MPV 10.3 9.2 - 11.8 FL    Nucleated RBCs 0.0 0  WBC    nRBC 0.00 0.00 - 0.01 K/uL    Seg Neutrophils 58 40 - 73 %    Lymphocytes 31 21 - 52 %    Monocytes 7 3 - 10 %    Eosinophils % 4 0 - 5 %    Basophils 0 0 - 2 %    Immature Granulocytes 1 (H) 0.0 - 0.5 %    Segs Absolute 7.3 1.8 - 8.0 K/UL    Absolute Lymph # 3.9 (H) 0.9 - 3.6 K/UL    Absolute Mono # 0.9 0.05 - 1.2 K/UL    Absolute Eos # 0.5 (H) 0.0 - 0.4 K/UL    Basophils Absolute 0.0 0.0 - 0.1 K/UL    Absolute Immature Granulocyte 0.1 (H) 0.00 - 0.04 K/UL    Differential Type AUTOMATED     Comprehensive Metabolic Panel    Collection Time: 03/03/23  1:20 AM   Result Value Ref Range    Sodium 137 136 - 145 mmol/L    Potassium 4.0 3.5 - 5.5 mmol/L    Chloride 104 100 - 111 mmol/L    CO2 25 21 - 32 mmol/L    Anion Gap 8 3.0 - 18 mmol/L    Glucose 172 (H) 74 - 99 mg/dL    BUN 24 (H) 7.0 - 18 MG/DL    Creatinine 1.07 0.6 - 1.3 MG/DL    Bun/Cre Ratio 22 (H) 12 - 20      Est, Glom Filt Rate 50 (L) >60 ml/min/1.73m2    Calcium 9.4 8.5 - 10.1 MG/DL    Total Bilirubin 0.5 0.2 - 1.0 MG/DL    ALT 18 13 - 56 U/L    AST 27 10 - 38 U/L    Alk Phosphatase 124 (H) 45 - 117 U/L    Total Protein 8.1 6.4 - 8.2 g/dL    Albumin 3.8 3.4 - 5.0 g/dL    Globulin 4.3 (H) 2.0 - 4.0 g/dL    Albumin/Globulin Ratio 0.9 0.8 - 1.7     Troponin    Collection Time: 03/03/23  1:20 AM   Result Value Ref Range    Troponin, High Sensitivity 7 0 - 54 ng/L   COVID-19, Rapid    Collection Time: 03/03/23  1:24 AM    Specimen: Nasopharyngeal   Result Value Ref Range    Source Nasopharyngeal      SARS-CoV-2, Rapid Detected (AA) NOTD     Rapid influenza A/B antigens    Collection Time: 03/03/23  1:24 AM    Specimen: Nasopharyngeal   Result Value Ref Range    Influenza A Ag Negative NEG      Influenza B Ag Negative NEG         PROCEDURES:    Unless otherwise noted below, none     Procedures      EMERGENCY DEPARTMENT COURSE and DIFFERENTIALDIAGNOSIS/ MDM:   Vitals:    Vitals:    03/03/23 0330 03/03/23 0430 03/03/23 0500 03/03/23 0530   BP: (!) 117/57 114/60 133/67 134/68   Pulse: 68 69 64 74   Resp: 13 13 14 18   Temp:       TempSrc:       SpO2: 100% 100% 100% 100%       MDM      6:27 AM case discussed with Dr. Mariposa Medel. She accepted patient for admission to medical floor. FINAL IMPRESSION      Covid -19  Pneumonia      DISPOSITION/PLAN     DISPOSITION  - admit to med surg    DISCHARGE MEDICATIONS:  New Prescriptions    No medications on file        PATIENT REFERRED TO:    (Please note that portions of this note were completed with a voice recognitionprogram.  Efforts were made to edit the dictations but occasionally words are mis-transcribed.)    Harjinder Serrano.  Eddie Stanley MD(electronically signed)  Attending Emergency Physician           Anyi Zuluaga MD  03/06/23 2082

## 2023-03-03 NOTE — ED NOTES
[facility] on site now to transport pt. Attempted to call pt daughter, Emile Marc at [phone], to inform about admisssion. Left voicemail to call this RN back at [address].      Sharmin Chaudhry RN  03/03/23 2206

## 2023-03-03 NOTE — ED NOTES
Report rec'd from RN. Pt awaiting admission for PNA. Pt medicated per orders. Bed assigned.       Minal Balnco RN  03/03/23 5571

## 2023-03-03 NOTE — ED NOTES
Patient resting comfortably on stretcher, purewick applied to patient.      Keena Parmar RN  03/03/23 0600

## 2023-03-03 NOTE — PROGRESS NOTES
conducted an initial consultation and Spiritual Assessment for [facility], who is a 80 y.o.,female. Patient's Primary Language is: [place]. According to the patient's EMR Yarsani Affiliation is: [facility].     The reason the Patient came to the hospital is:   Patient Active Problem List    Diagnosis Date Noted    Pneumonia due to COVID-19 virus 03/03/2023    COPD with acute exacerbation ([facility] 05/18/2021    Respiratory failure ([facility] 04/17/2020    Suspected COVID-19 virus infection 04/13/2020    COPD exacerbation ([facility] 04/12/2020    Hypoxia 06/30/2019    Controlled type 2 diabetes mellitus, without long-term current use of insulin ([facility] 06/30/2019    Essential hypertension 06/30/2019        The  provided the following Interventions:  Initiated a relationship of care and support. Chart reviewed. Assessment:  Not able to assess the patient due to medical condition. No family at bedside. Plan:  Chaplains will continue to follow and will provide pastoral care on an as needed/requested basis.  recommends bedside caregivers page  on duty if patient shows signs of acute spiritual or emotional distress.     [address]   [phone]

## 2023-03-04 LAB
ANION GAP SERPL CALC-SCNC: 7 MMOL/L (ref 3–18)
BASOPHILS # BLD: 0 K/UL (ref 0–0.1)
BASOPHILS NFR BLD: 0 % (ref 0–2)
BUN SERPL-MCNC: 28 MG/DL (ref 7–18)
BUN/CREAT SERPL: 30 (ref 12–20)
CALCIUM SERPL-MCNC: 10.1 MG/DL (ref 8.5–10.1)
CHLORIDE SERPL-SCNC: 107 MMOL/L (ref 100–111)
CO2 SERPL-SCNC: 25 MMOL/L (ref 21–32)
CREAT SERPL-MCNC: 0.94 MG/DL (ref 0.6–1.3)
DIFFERENTIAL METHOD BLD: ABNORMAL
EOSINOPHIL # BLD: 0 K/UL (ref 0–0.4)
EOSINOPHIL NFR BLD: 0 % (ref 0–5)
ERYTHROCYTE [DISTWIDTH] IN BLOOD BY AUTOMATED COUNT: 13.9 % (ref 11.6–14.5)
GLUCOSE BLD STRIP.AUTO-MCNC: 122 MG/DL (ref 70–110)
GLUCOSE BLD STRIP.AUTO-MCNC: 133 MG/DL (ref 70–110)
GLUCOSE BLD STRIP.AUTO-MCNC: 136 MG/DL (ref 70–110)
GLUCOSE SERPL-MCNC: 143 MG/DL (ref 74–99)
HCT VFR BLD AUTO: 39.9 % (ref 35–45)
HGB BLD-MCNC: 13 G/DL (ref 12–16)
IMM GRANULOCYTES # BLD AUTO: 0.1 K/UL (ref 0–0.04)
IMM GRANULOCYTES NFR BLD AUTO: 1 % (ref 0–0.5)
LYMPHOCYTES # BLD: 2.7 K/UL (ref 0.9–3.6)
LYMPHOCYTES NFR BLD: 19 % (ref 21–52)
MCH RBC QN AUTO: 27.1 PG (ref 24–34)
MCHC RBC AUTO-ENTMCNC: 32.6 G/DL (ref 31–37)
MCV RBC AUTO: 83.1 FL (ref 78–100)
MONOCYTES # BLD: 1 K/UL (ref 0.05–1.2)
MONOCYTES NFR BLD: 8 % (ref 3–10)
NEUTS SEG # BLD: 9.9 K/UL (ref 1.8–8)
NEUTS SEG NFR BLD: 72 % (ref 40–73)
NRBC # BLD: 0 K/UL (ref 0–0.01)
NRBC BLD-RTO: 0 PER 100 WBC
PLATELET # BLD AUTO: 230 K/UL (ref 135–420)
PMV BLD AUTO: 11.3 FL (ref 9.2–11.8)
POTASSIUM SERPL-SCNC: 4.3 MMOL/L (ref 3.5–5.5)
RBC # BLD AUTO: 4.8 M/UL (ref 4.2–5.3)
SODIUM SERPL-SCNC: 139 MMOL/L (ref 136–145)
WBC # BLD AUTO: 13.7 K/UL (ref 4.6–13.2)

## 2023-03-04 PROCEDURE — 6360000002 HC RX W HCPCS: Performed by: HOSPITALIST

## 2023-03-04 PROCEDURE — 97165 OT EVAL LOW COMPLEX 30 MIN: CPT

## 2023-03-04 PROCEDURE — 1100000003 HC PRIVATE W/ TELEMETRY

## 2023-03-04 PROCEDURE — 2580000003 HC RX 258: Performed by: HOSPITALIST

## 2023-03-04 PROCEDURE — 94640 AIRWAY INHALATION TREATMENT: CPT

## 2023-03-04 PROCEDURE — 97161 PT EVAL LOW COMPLEX 20 MIN: CPT

## 2023-03-04 PROCEDURE — 80048 BASIC METABOLIC PNL TOTAL CA: CPT

## 2023-03-04 PROCEDURE — 85025 COMPLETE CBC W/AUTO DIFF WBC: CPT

## 2023-03-04 PROCEDURE — 94761 N-INVAS EAR/PLS OXIMETRY MLT: CPT

## 2023-03-04 PROCEDURE — 82962 GLUCOSE BLOOD TEST: CPT

## 2023-03-04 PROCEDURE — 6370000000 HC RX 637 (ALT 250 FOR IP): Performed by: HOSPITALIST

## 2023-03-04 PROCEDURE — 99232 SBSQ HOSP IP/OBS MODERATE 35: CPT | Performed by: HOSPITALIST

## 2023-03-04 PROCEDURE — 97535 SELF CARE MNGMENT TRAINING: CPT

## 2023-03-04 PROCEDURE — 36415 COLL VENOUS BLD VENIPUNCTURE: CPT

## 2023-03-04 RX ADMIN — LISINOPRIL 40 MG: 40 TABLET ORAL at 09:24

## 2023-03-04 RX ADMIN — CLONIDINE HYDROCHLORIDE 0.2 MG: 0.1 TABLET ORAL at 09:24

## 2023-03-04 RX ADMIN — ASPIRIN 81 MG CHEWABLE TABLET 81 MG: 81 TABLET CHEWABLE at 09:25

## 2023-03-04 RX ADMIN — BUDESONIDE AND FORMOTEROL FUMARATE DIHYDRATE 2 PUFF: 160; 4.5 AEROSOL RESPIRATORY (INHALATION) at 07:17

## 2023-03-04 RX ADMIN — TIOTROPIUM BROMIDE INHALATION SPRAY 2 PUFF: 3.12 SPRAY, METERED RESPIRATORY (INHALATION) at 07:17

## 2023-03-04 RX ADMIN — CLONIDINE HYDROCHLORIDE 0.2 MG: 0.1 TABLET ORAL at 21:42

## 2023-03-04 RX ADMIN — BUDESONIDE AND FORMOTEROL FUMARATE DIHYDRATE 2 PUFF: 160; 4.5 AEROSOL RESPIRATORY (INHALATION) at 20:20

## 2023-03-04 RX ADMIN — NIFEDIPINE 60 MG: 30 TABLET, FILM COATED, EXTENDED RELEASE ORAL at 09:24

## 2023-03-04 RX ADMIN — PRAVASTATIN SODIUM 40 MG: 20 TABLET ORAL at 09:24

## 2023-03-04 RX ADMIN — ENOXAPARIN SODIUM 40 MG: 100 INJECTION SUBCUTANEOUS at 09:22

## 2023-03-04 RX ADMIN — WATER 1000 MG: 1 INJECTION INTRAMUSCULAR; INTRAVENOUS; SUBCUTANEOUS at 09:23

## 2023-03-04 RX ADMIN — AZITHROMYCIN DIHYDRATE 500 MG: 500 INJECTION, POWDER, LYOPHILIZED, FOR SOLUTION INTRAVENOUS at 09:23

## 2023-03-04 NOTE — PROGRESS NOTES
Comprehensive Nutrition Assessment    Type and Reason for Visit:  Initial, Positive Nutrition Screen    Nutrition Recommendations/Plan:   Modify PO diet to easy to chew per pt preference, CCHO4, 2gm Na Restriction. Continue to monitor tolerance of PO, compliance of oral supplements, weight, labs, and plan of care during admission. Malnutrition Assessment:  Malnutrition Status: At risk for malnutrition (Comment) (COVID-19) (03/04/23 0130)    Context:  Acute Illness       Nutrition History and Allergies: PMHx: COPD, HTN, DM. Wt hx: 171 lb (9/1/21), 166 lb (1/6/22). No new wt taken this admit. Unknown of wt, thinks she may weigh 167 lb, denies any significant wt changes. Pt reports eating well at home, no decrease in appetite. NKFA. Nutrition Assessment:    Admitted for pneumonia due to COVID-19. Positive nutrition screen noted, MST. Per flowsheet, pt ate % of meal yesterday. Called pt via phone, pt reports eating well in-house, missing teeth - requesting easy to chew foods, prefers tea. Nutrition Related Findings:    Last BM (including prior to admit): 03/03/23. Pertinent Meds: rocephin, lovenox, humalog, pravastatin Pertinent Labs: Hgb A1C 7 Wound Type: None       Current Nutrition Intake & Therapies:    Average Meal Intake: %  Average Supplements Intake: None Ordered  ADULT DIET; Regular; Low Fat/Low Chol/High Fiber/2 gm Na    Anthropometric Measures:  Height: 5' 4\" (162.6 cm)  Ideal Body Weight (IBW): 120 lbs (55 kg)    Admission Body Weight: 167 lb 5.3 oz (75.9 kg) (75.9 kg)  Current Body Weight: 167 lb 5.3 oz (75.9 kg) (pt stated), 139.4 % IBW.     Current BMI (kg/m2): 28.7    Estimated Daily Nutrient Needs:  Energy Requirements Based On: Formula  Weight Used for Energy Requirements: Admission  Energy (kcal/day): 2870-4229 (MSJ 1.2-1.3)  Weight Used for Protein Requirements: Admission  Protein (g/day): 76-91 (1-1.2 g/day)  Method Used for Fluid Requirements: 1 ml/kcal  Fluid (ml/day): 4370-9570    Nutrition Diagnosis:   Increased nutrient needs related to catabolic illness as evidenced by  (COVID-19)    Nutrition Interventions:   Food and/or Nutrient Delivery: Modify Current Diet  Nutrition Education/Counseling: No recommendation at this time  Coordination of Nutrition Care: Continue to monitor while inpatient  Plan of Care discussed with: pt    Goals:     Goals: Meet at least 75% of estimated needs, by next RD assessment       Nutrition Monitoring and Evaluation:   Behavioral-Environmental Outcomes: None Identified  Food/Nutrient Intake Outcomes: Food and Nutrient Intake  Physical Signs/Symptoms Outcomes: Biochemical Data, Chewing or Swallowing, GI Status, Hemodynamic Status, Meal Time Behavior, Weight, Fluid Status or Edema    Discharge Planning:    Continue current diet     Ana Rosa Olson Chris [address], [address]   Contact: [phone]

## 2023-03-04 NOTE — PROGRESS NOTES
[facility] Hospitalist Group  Progress Note    Patient: Desmond Baker Age: 80 y.o. : 1934 MR#: [id number] SSN: [id number]  Date/Time: 3/4/2023     Subjective:     Patient seen and evaluated, sitting up in bed, no acute distress. Patient mentions she is doing much better today. She is off supplemental oxygen. Shortness of breath is improved. Assessment/Plan:     Pneumonia due to COVID-19 infection-IV antibiotics, patient is currently not symptomatic from [facility]. We will continue to monitor. Shortness of breath likely multifactorial, related to COVID-19 infection, pneumonia, COPD-continue to monitor, patient is currently on 2 L nasal cannula in the hospital.  She is unable to confirm if she is on oxygen at home. History of COPD-continue Trelegy and bronchodilators  History of hypertension-resume home medications, continue to monitor blood pressure  History of type 2 diabetes-check A1c, insulin sliding scale  DVT prophylaxis - Lovenox  Discharge home in a.m. Full code                  Danielle Jaramillo MD  23      Case discussed with:  []Patient  []Family  []Nursing  []Case Management  DVT Prophylaxis:  []Lovenox  []Hep SQ  []SCDs  []Coumadin   []On Heparin gtt    Objective:   VS:   Vitals:    23 1226   BP: 123/65   Pulse: 70   Resp: 16   Temp: 97.9 °F (36.6 °C)   SpO2: 97%        Intake/Output Summary (Last 24 hours) at 3/4/2023 1548  Last data filed at 3/4/2023 0945  Gross per 24 hour   Intake 480 ml   Output --   Net 480 ml       General:  Alert, cooperative, no acute distress    Pulmonary:  CTA Bilaterally. No Wheezing/Rhonchi/Rales. Cardiovascular: Regular rate and Rhythm. GI:  Soft, Non distended, Non tender. + Bowel sounds. Extremities:  No edema, cyanosis, clubbing. No calf tenderness. Neurologic: Alert and oriented X 3. No acute neuro deficits.   Additional:    Medications:   Current Facility-Administered Medications   Medication Dose Route Frequency    cloNIDine (CATAPRES) tablet 0.2 mg  0.2 mg Oral BID    aspirin chewable tablet 81 mg  81 mg Oral Daily    ipratropium-albuterol (DUONEB) nebulizer solution 3 mL  3 mL Inhalation Q4H PRN    lisinopril (PRINIVIL;ZESTRIL) tablet 40 mg  40 mg Oral Daily    pravastatin (PRAVACHOL) tablet 40 mg  40 mg Oral Daily    enoxaparin (LOVENOX) injection 40 mg  40 mg SubCUTAneous Daily    ondansetron (ZOFRAN-ODT) disintegrating tablet 4 mg  4 mg Oral Q8H PRN    Or    ondansetron (ZOFRAN) injection 4 mg  4 mg IntraVENous Q6H PRN    polyethylene glycol (GLYCOLAX) packet 17 g  17 g Oral Daily PRN    acetaminophen (TYLENOL) tablet 650 mg  650 mg Oral Q6H PRN    Or    acetaminophen (TYLENOL) suppository 650 mg  650 mg Rectal Q6H PRN    cefTRIAXone (ROCEPHIN) 1,000 mg in sterile water 10 mL IV syringe  1,000 mg IntraVENous Q24H    azithromycin (ZITHROMAX) 500 mg in sodium chloride 0.9% 250 mL (vial-mate/adapter) IVPB  500 mg IntraVENous Q24H    budesonide-formoterol (SYMBICORT) 160-4.5 MCG/ACT inhaler 2 puff  2 puff Inhalation BID    And    tiotropium (SPIRIVA RESPIMAT) 2.5 MCG/ACT inhaler 2 puff  2 puff Inhalation Daily    NIFEdipine (PROCARDIA XL) extended release tablet 60 mg  60 mg Oral Daily    glucose chewable tablet 16 g  4 tablet Oral PRN    dextrose bolus 10% 125 mL  125 mL IntraVENous PRN    Or    dextrose bolus 10% 250 mL  250 mL IntraVENous PRN    glucagon (rDNA) injection 1 mg  1 mg SubCUTAneous PRN    dextrose 10 % infusion   IntraVENous Continuous PRN    insulin lispro (HUMALOG) injection vial 0-4 Units  0-4 Units SubCUTAneous TID WC    insulin lispro (HUMALOG) injection vial 0-4 Units  0-4 Units SubCUTAneous Nightly       Imaging:   XR CHEST PORTABLE    Result Date: 3/3/2023  No definite acute findings. Left midlung and infrahilar likely streaky atelectasis/scarring but possibly infiltrate.        Labs:    Recent Results (from the past 48 hour(s))   CBC with Auto Differential    Collection Time: 03/03/23  1:20 AM   Result Value Ref Range    WBC 12.7 4.6 - 13.2 K/uL    RBC 4.71 4.20 - 5.30 M/uL    Hemoglobin 13.0 12.0 - 16.0 g/dL    Hematocrit 39.0 35.0 - 45.0 %    MCV 82.8 78.0 - 100.0 FL    MCH 27.6 24.0 - 34.0 PG    MCHC 33.3 31.0 - 37.0 g/dL    RDW 13.5 11.6 - 14.5 %    Platelets 126 799 - 177 K/uL    MPV 10.3 9.2 - 11.8 FL    Nucleated RBCs 0.0 0  WBC    nRBC 0.00 0.00 - 0.01 K/uL    Seg Neutrophils 58 40 - 73 %    Lymphocytes 31 21 - 52 %    Monocytes 7 3 - 10 %    Eosinophils % 4 0 - 5 %    Basophils 0 0 - 2 %    Immature Granulocytes 1 (H) 0.0 - 0.5 %    Segs Absolute 7.3 1.8 - 8.0 K/UL    Absolute Lymph # 3.9 (H) 0.9 - 3.6 K/UL    Absolute Mono # 0.9 0.05 - 1.2 K/UL    Absolute Eos # 0.5 (H) 0.0 - 0.4 K/UL    Basophils Absolute 0.0 0.0 - 0.1 K/UL    Absolute Immature Granulocyte 0.1 (H) 0.00 - 0.04 K/UL    Differential Type AUTOMATED     Comprehensive Metabolic Panel    Collection Time: 03/03/23  1:20 AM   Result Value Ref Range    Sodium 137 136 - 145 mmol/L    Potassium 4.0 3.5 - 5.5 mmol/L    Chloride 104 100 - 111 mmol/L    CO2 25 21 - 32 mmol/L    Anion Gap 8 3.0 - 18 mmol/L    Glucose 172 (H) 74 - 99 mg/dL    BUN 24 (H) 7.0 - 18 MG/DL    Creatinine 1.07 0.6 - 1.3 MG/DL    Bun/Cre Ratio 22 (H) 12 - 20      Est, Glom Filt Rate 50 (L) >60 ml/min/1.73m2    Calcium 9.4 8.5 - 10.1 MG/DL    Total Bilirubin 0.5 0.2 - 1.0 MG/DL    ALT 18 13 - 56 U/L    AST 27 10 - 38 U/L    Alk Phosphatase 124 (H) 45 - 117 U/L    Total Protein 8.1 6.4 - 8.2 g/dL    Albumin 3.8 3.4 - 5.0 g/dL    Globulin 4.3 (H) 2.0 - 4.0 g/dL    Albumin/Globulin Ratio 0.9 0.8 - 1.7     Troponin    Collection Time: 03/03/23  1:20 AM   Result Value Ref Range    Troponin, High Sensitivity 7 0 - 54 ng/L   COVID-19, Rapid    Collection Time: 03/03/23  1:24 AM    Specimen: Nasopharyngeal   Result Value Ref Range    Source Nasopharyngeal      SARS-CoV-2, Rapid Detected (AA) NOTD     Rapid influenza A/B antigens    Collection Time: 03/03/23  1:24 AM    Specimen: Nasopharyngeal   Result Value Ref Range    Influenza A Ag Negative NEG      Influenza B Ag Negative NEG     EKG 12 Lead    Collection Time: 03/03/23  1:28 AM   Result Value Ref Range    Ventricular Rate 87 BPM    Atrial Rate 87 BPM    P-R Interval 212 ms    QRS Duration 74 ms    Q-T Interval 366 ms    QTc Calculation (Bazett) 440 ms    P Axis 78 degrees    R Axis 51 degrees    T Axis 53 degrees    Diagnosis Sinus rhythm with 1st degree AV block    Culture, Blood 1    Collection Time: 03/03/23  6:20 AM    Specimen: Blood   Result Value Ref Range    Special Requests NO SPECIAL REQUESTS      Culture NO GROWTH AFTER 22 HOURS     Culture, Blood 2    Collection Time: 03/03/23  6:25 AM    Specimen: Blood   Result Value Ref Range    Special Requests NO SPECIAL REQUESTS      Culture NO GROWTH AFTER 22 HOURS     Urinalysis    Collection Time: 03/03/23  6:45 AM   Result Value Ref Range    Color, UA YELLOW      Appearance CLEAR      Specific Gravity, UA 1.008 1.005 - 1.030      pH, Urine 6.0 5.0 - 8.0      Protein, UA Negative NEG mg/dL    Glucose, UA Negative NEG mg/dL    Ketones, Urine Negative NEG mg/dL    Bilirubin Urine Negative NEG      Blood, Urine Negative NEG      Urobilinogen, Urine 0.2 0.2 - 1.0 EU/dL    Nitrite, Urine Negative NEG      Leukocyte Esterase, Urine Negative NEG     POCT Glucose    Collection Time: 03/03/23  4:40 PM   Result Value Ref Range    POC Glucose 189 (H) 70 - 110 mg/dL   Hemoglobin A1c    Collection Time: 03/03/23  6:10 PM   Result Value Ref Range    Hemoglobin A1C 7.0 (H) 4.2 - 5.6 %    eAG 154 mg/dL   POCT Glucose    Collection Time: 03/03/23  8:12 PM   Result Value Ref Range    POC Glucose 133 (H) 70 - 110 mg/dL   Basic Metabolic Panel w/ Reflex to MG    Collection Time: 03/04/23  2:46 AM   Result Value Ref Range    Sodium 139 136 - 145 mmol/L    Potassium 4.3 3.5 - 5.5 mmol/L    Chloride 107 100 - 111 mmol/L    CO2 25 21 - 32 mmol/L    Anion Gap 7 3.0 - 18 mmol/L    Glucose 143 (H) 74 - 99 mg/dL    BUN 28 (H) 7.0 - 18 MG/DL    Creatinine 0.94 0.6 - 1.3 MG/DL    Bun/Cre Ratio 30 (H) 12 - 20      Est, Glom Filt Rate 58 (L) >60 ml/min/1.73m2    Calcium 10.1 8.5 - 10.1 MG/DL   CBC with Auto Differential    Collection Time: 03/04/23  2:46 AM   Result Value Ref Range    WBC 13.7 (H) 4.6 - 13.2 K/uL    RBC 4.80 4.20 - 5.30 M/uL    Hemoglobin 13.0 12.0 - 16.0 g/dL    Hematocrit 39.9 35.0 - 45.0 %    MCV 83.1 78.0 - 100.0 FL    MCH 27.1 24.0 - 34.0 PG    MCHC 32.6 31.0 - 37.0 g/dL    RDW 13.9 11.6 - 14.5 %    Platelets 363 211 - 599 K/uL    MPV 11.3 9.2 - 11.8 FL    Nucleated RBCs 0.0 0  WBC    nRBC 0.00 0.00 - 0.01 K/uL    Seg Neutrophils 72 40 - 73 %    Lymphocytes 19 (L) 21 - 52 %    Monocytes 8 3 - 10 %    Eosinophils % 0 0 - 5 %    Basophils 0 0 - 2 %    Immature Granulocytes 1 (H) 0.0 - 0.5 %    Segs Absolute 9.9 (H) 1.8 - 8.0 K/UL    Absolute Lymph # 2.7 0.9 - 3.6 K/UL    Absolute Mono # 1.0 0.05 - 1.2 K/UL    Absolute Eos # 0.0 0.0 - 0.4 K/UL    Basophils Absolute 0.0 0.0 - 0.1 K/UL    Absolute Immature Granulocyte 0.1 (H) 0.00 - 0.04 K/UL    Differential Type AUTOMATED     POCT Glucose    Collection Time: 03/04/23 12:08 PM   Result Value Ref Range    POC Glucose 122 (H) 70 - 110 mg/dL       Signed By: Herman Montague MD     March 4, 2023      I spent 35 minutes with the patient in face-to-face consultation, of which greater than 50% was spent in counseling and coordination of care as described above    Disclaimer: Sections of this note are dictated using utilizing voice recognition software. Minor typographical errors may be present. If questions arise, please do not hesitate to contact me or call our department.

## 2023-03-04 NOTE — CARE COORDINATION
03/04/23 1555   Service Assessment   Patient Orientation Alert and Oriented   Cognition Alert   History Provided By Child/Family   Primary Caregiver Self   Support Systems Children   PCP Verified by CM Yes   Last Visit to PCP Within last 3 months   Prior Functional Level Independent in ADLs/IADLs   Current Functional Level Independent in ADLs/IADLs   Can patient return to prior living arrangement Yes   Ability to make needs known: Good   Family able to assist with home care needs: Yes   Would you like for me to discuss the discharge plan with any other family members/significant others, and if so, who? Yes  (Daughter Jo)   Community Resources None   Social/Functional History   Type of Home Apartment   Home Access Elevator   Home Equipment Oxygen   Receives Help From Family   ADL Assistance Independent   Homemaking Assistance Independent   Ambulation Assistance Independent   Transfer Assistance Independent   Mode of Transportation Other  (Will require assistance on dc)   Occupation Retired   Discharge Planning   Type of Residence Apartment   Living Arrangements Alone   Current Services Prior To Admission None   Patient expects to be discharged to: Apartment   Services At/After Discharge   Mode of Transport at Discharge BLS   Confirm Follow Up Transport Family  (Will require assistance on dc)     CM verified patient home address as:    [facility]  [address]   [address]  [place] [address]    Patient lives alone on the third floor with elevator access, no DME used in the home, independent of ADLs. Will require transport assistance on dc, Grand daughter to bring clothing to unit for patient, advised to provide to Nurse.

## 2023-03-04 NOTE — PROGRESS NOTES
OCCUPATIONAL THERAPY EVALUATION/DISCHARGE    Patient: Adriano Ott (4 y.o. female)  Date: 3/4/2023  Primary Diagnosis: Pneumonia due to infectious organism, unspecified laterality, unspecified part of lung [J18.9]  Pneumonia due to COVID-19 virus [U07.1, J12.82]  COVID-19 [U07.1]       Precautions: General Precautions, Contact Precautions (Droplet plus)  PLOF: Pt reports she moved into [facility] a little more than a week ago. Pt is Mod Ind for basic ADLs and functional mobility w/o AD, staff at [facility] assists with meals and some IADLs. ASSESSMENT AND RECOMMENDATIONS:    Based on the objective data below, patient presents with ability to perform basic ADLs and functional transfers at baseline level of function. Pt performed/simulated UB/LB ADLs with Mod Ind for seated and std aspects. Pt demonstrates appropriate safety awareness during all standing tasks and functional mobility without AD. No SOB noted/reported. Pt is on room air. Pt is very talkative, reports this making her tired sometime. Pt educated on mult energy conservation techniques to utilize in home environment and while at the hospital, including pacing and deep breathing to prevent SOB and fatigue, and increase activity tolerance and safety w/ADLs and functional mobility, pt verbalized understanding. Maximum therapeutic gains met at current level of care and patient will be discharged from occupational therapy at this time. Further Equipment Recommendations for Discharge: shower chair    Discharge Recommendations: Home with assist PRN    AMPAC: At this time and based on an AM-PAC score of 24/24, no further OT is recommended upon discharge due to (i.e. patient at baseline functional statusetc). Recommend patient returns to prior setting with prior services. This AMPAC score should be considered in conjunction with interdisciplinary team recommendations to determine the most appropriate discharge setting.  Patient's social support, diagnosis, medical stability, and prior level of function should also be taken into consideration.      SUBJECTIVE:   Patient stated I feel good    OBJECTIVE DATA SUMMARY:     Past Medical History:   Diagnosis Date    Bronchitis     Diabetes ([address]     Hyperlipemia     Hypertension     Pneumonia Jun-Jul 2019     Past Surgical History:   Procedure Laterality Date    CATARACT REMOVAL      COLONOSCOPY N/A 6/7/2017    COLONOSCOPY / polypectomy performed by Robin Patel MD at [facility] ENDOSCOPY    COLONOSCOPY      2011       Home Situation:   Social/Functional History  Lives With: Alone  Type of Home: Senior housing apartment (retirement?)  Home Layout: One level  Bathroom Shower/Tub: Walk-in shower  ADL Assistance: Independent  Homemaking Assistance: Needs assistance  Ambulation Assistance: Independent  Transfer Assistance: Independent  [x]  Right hand dominant   []  Left hand dominant    Cognitive/Behavioral Status:  Orientation  Overall Orientation Status: Within Functional Limits  Orientation Level: Oriented to place;Oriented to time;Oriented to person  Cognition  Overall Cognitive Status: WFL    Skin: visible skin intact  Edema: none noted    Vision/Perceptual:    Vision  Vision: Within Functional Limits       Coordination: BUE  Coordination: Generally decreased, functional     Coordination: Within functional limits      Balance:     Balance  Sitting: Intact  Standing: Intact    Strength: BUE  Strength: Generally decreased, functional    Tone & Sensation: BUE  Tone: Normal  Sensation: Intact    Range of Motion: BUE  AROM: Within functional limits       Functional Mobility and Transfers for ADLs:  Bed Mobility:     Bed Mobility Training  Bed Mobility Training: Yes  Overall Level of Assistance: Modified independent  Supine to Sit: Modified independent  Sit to Supine: Modified independent  Transfers:      Transfer Training  Transfer Training: Yes  Sit to Stand: Modified independent  Stand to Sit: Modified independent    ADL Assessment:   Feeding: Independent  Grooming: Modified independent   UE Bathing: Modified independent   LE Bathing: Modified independent   UE Dressing: Modified independent   LE Dressing: Modified independent   Toileting: Modified independent     Pain:  Pain level pre-treatment: 0/10   Pain level post-treatment: 0/10     Activity Tolerance:   Activity Tolerance: Patient Tolerated treatment well  Please refer to the flowsheet for vital signs taken during this treatment. After treatment:   [] Patient left in no apparent distress sitting up in chair  [x] Patient left in no apparent distress in bed  [x] Call bell left within reach  [x] Nursing notified  [] Caregiver present  [] Bed alarm activated    COMMUNICATION/EDUCATION:   Patient Education  Education Provided: Role of Therapy; Energy Conservation  Education Method: Verbal;Teach Back  Barriers to Learning: None  Education Outcome: Verbalized understanding    Thank you for this referral.  Syd Shelton OTR/L  Minutes: 18    Eval Complexity: Decision [facility] [employer] AM-PAC® Daily Activity Inpatient Short Form (6-Clicks)*    How much HELP from another person does the patient currently need    (If the patient hasn't done an activity recently, how much help from another person do you think he/she would need if he/she tried?)   Total (Total A or Dep)   A Lot  (Mod to Max A)   A Little (Sup or Min A)   None (Mod I to I)   Putting on and taking off regular lower body clothing? [] 1 [] 2 [] 3 [x] 4   2. Bathing (including washing, rinsing,      drying)? [] 1 [] 2 [] 3 [x] 4   3. Toileting, which includes using toilet, bedpan or urinal?   [] 1 [] 2 [] 3 [x] 4   4. Putting on and taking off regular upper body clothing? [] 1 [] 2 [] 3 [x] 4   5. Taking care of personal grooming such as brushing teeth? [] 1 [] 2 [] 3 [x] 4   6. Eating meals?    [] 1 [] 2 [] 3 [x] 4       At this time and based on an AM-PAC score of 24/24, no further OT is recommended upon discharge due to (i.e. patient at baseline functional statusetc). Recommend patient returns to prior setting with prior services.

## 2023-03-04 NOTE — PROGRESS NOTES
Patient resting, CM placed call to Daughter listed for DCP purposes, CM to to follow up with family and patient for DCP, per Attending Physician anticipated DC to home 3/5/2023.

## 2023-03-04 NOTE — PROGRESS NOTES
PHYSICAL THERAPY RE-EVALUATION/DISCHARGE    Patient: Jem Dc (1 y.o. female)  Date: 3/4/2023  Primary Diagnosis: Pneumonia due to infectious organism, unspecified laterality, unspecified part of lung [J18.9]  Pneumonia due to COVID-19 virus [U07.1, J12.82]  COVID-19 [U07.1]       Precautions: standard  PLOF: Lives alone in single story apartment. ASSESSMENT AND RECOMMENDATIONS:  Patient received in bed and willing to work with PT. She is independent with bed mobility and functional transfers. She is ambulating to the restroom on her own with steady gait. She does denies SOB with mobility on RA. Patient does not require further skilled physical therapy intervention at this level of care. Further Equipment Recommendations for Discharge: n/a         AMPAC:   At this time and based on an AM-PAC score of 24/24 (or **/20 if omitting stairs), no further PT is recommended upon discharge due to (i.e. patient at baseline functional statusetc). Recommend patient returns to prior setting with prior services. This AMPAC score should be considered in conjunction with interdisciplinary team recommendations to determine the most appropriate discharge setting. Patient's social support, diagnosis, medical stability, and prior level of function should also be taken into consideration. SUBJECTIVE:   Patient stated I love to walk.     OBJECTIVE DATA SUMMARY:     Past Medical History:   Diagnosis Date    Bronchitis     Diabetes ([address]     Hyperlipemia     Hypertension     Pneumonia Jun-Jul 2019     Past Surgical History:   Procedure Laterality Date    CATARACT REMOVAL      COLONOSCOPY N/A 6/7/2017    COLONOSCOPY / polypectomy performed by Vickey Frausto MD at [facility]    COLONOSCOPY      2011       Home Situation:  Social/Functional History  Lives With: Alone  Type of Home: Apartment  ADL Assistance: Independent  Ambulation Assistance: Independent  Critical Behavior:  Orientation  Overall Orientation Status: Within Normal Limits  Orientation Level: Oriented X4  Cognition  Overall Cognitive Status: WNL    Strength:    Strength: Generally decreased, functional    Tone & Sensation:   Tone: Normal  Sensation: Intact    Range Of Motion:  AROM: Within functional limits       Functional Mobility:  Bed Mobility:     Bed Mobility Training  Bed Mobility Training: Yes  Overall Level of Assistance: Modified independent  Supine to Sit: Modified independent  Sit to Supine: Modified independent  Transfers:     Transfer Training  Transfer Training: Yes  Sit to Stand: Modified independent  Stand to Sit: Modified independent  Balance:   Balance  Sitting: Intact  Standing: Intact    Ambulation/Gait Training:  Gait  Overall Level of Assistance: Modified independent  Distance (ft): 40 Feet       Pain:  Pain level pre-treatment: 0/10   Pain level post-treatment: 0/10   Pain Intervention(s): Medication (see MAR); Rest, Ice, Repositioning  Response to intervention: Nurse notified, See doc flow    Activity Tolerance:   Activity Tolerance: Patient tolerated evaluation without incident  Please refer to the flowsheet for vital signs taken during this treatment.     After treatment:   []         Patient left in no apparent distress sitting up in chair  [x]         Patient left in no apparent distress in bed  [x]         Call bell left within reach  [x]         Nursing notified  []         Caregiver present  []         Bed alarm activated  []         SCDs applied    COMMUNICATION/EDUCATION:   Patient Education  Education Given To: Patient  Education Provided: Role of Therapy  Education Method: Verbal  Barriers to Learning: None  Education Outcome: Verbalized understanding    Thank you for this referral.  Missy Norris, PT  Minutes: [facility] (6-Clicks) Version 2    How much HELP from another person does the patient currently need    (If the patient hasn't done an activity recently, how much help from another person do you think he/she would need if he/she tried?)   Total (Total A or Dep)   A Lot  (Mod to Max A)   A Little (Sup or Min A)   None (Mod I to I)   Turning from your back to your side while in a flat bed without using bedrails? [] 1 [] 2 [] 3 [x] 4   2. Moving from lying on your back to sitting on the side of a flat bed without using bedrails? [] 1 [] 2 [] 3 [x] 4   3. Moving to and from a bed to a chair (including a wheelchair)? [] 1 [] 2 [] 3 [x] 4   4. Standing up from a chair using your arms (e.g., wheelchair, or bedside chair)? [] 1 [] 2 [] 3 [x] 4   5. Walking in hospital room? [] 1 [] 2 [] 3 [x] 4   6. Climbing 3-5 steps with a railing?+   [] 1 [] 2 [] 3 [x] 4   +If stair climbing cannot be assessed, skip item #6. Sum responses from items 1-5.

## 2023-03-05 ENCOUNTER — HOME HEALTH ADMISSION (OUTPATIENT)
Age: 88
End: 2023-03-05
Payer: MEDICARE

## 2023-03-05 VITALS
WEIGHT: 160.5 LBS | OXYGEN SATURATION: 100 % | SYSTOLIC BLOOD PRESSURE: 117 MMHG | DIASTOLIC BLOOD PRESSURE: 63 MMHG | HEIGHT: 64 IN | HEART RATE: 83 BPM | BODY MASS INDEX: 27.4 KG/M2 | RESPIRATION RATE: 20 BRPM | TEMPERATURE: 97.8 F

## 2023-03-05 LAB
ANION GAP SERPL CALC-SCNC: 6 MMOL/L (ref 3–18)
BASOPHILS # BLD: 0.1 K/UL (ref 0–0.1)
BASOPHILS NFR BLD: 0 % (ref 0–2)
BUN SERPL-MCNC: 29 MG/DL (ref 7–18)
BUN/CREAT SERPL: 28 (ref 12–20)
CALCIUM SERPL-MCNC: 9.6 MG/DL (ref 8.5–10.1)
CHLORIDE SERPL-SCNC: 108 MMOL/L (ref 100–111)
CO2 SERPL-SCNC: 24 MMOL/L (ref 21–32)
CREAT SERPL-MCNC: 1.05 MG/DL (ref 0.6–1.3)
DIFFERENTIAL METHOD BLD: ABNORMAL
EOSINOPHIL # BLD: 0.3 K/UL (ref 0–0.4)
EOSINOPHIL NFR BLD: 2 % (ref 0–5)
ERYTHROCYTE [DISTWIDTH] IN BLOOD BY AUTOMATED COUNT: 14.2 % (ref 11.6–14.5)
GLUCOSE BLD STRIP.AUTO-MCNC: 126 MG/DL (ref 70–110)
GLUCOSE BLD STRIP.AUTO-MCNC: 151 MG/DL (ref 70–110)
GLUCOSE BLD STRIP.AUTO-MCNC: 178 MG/DL (ref 70–110)
GLUCOSE SERPL-MCNC: 119 MG/DL (ref 74–99)
HCT VFR BLD AUTO: 37.3 % (ref 35–45)
HGB BLD-MCNC: 12.4 G/DL (ref 12–16)
IMM GRANULOCYTES # BLD AUTO: 0.1 K/UL (ref 0–0.04)
IMM GRANULOCYTES NFR BLD AUTO: 1 % (ref 0–0.5)
LYMPHOCYTES # BLD: 3.7 K/UL (ref 0.9–3.6)
LYMPHOCYTES NFR BLD: 26 % (ref 21–52)
MCH RBC QN AUTO: 27.6 PG (ref 24–34)
MCHC RBC AUTO-ENTMCNC: 33.2 G/DL (ref 31–37)
MCV RBC AUTO: 83.1 FL (ref 78–100)
MONOCYTES # BLD: 1 K/UL (ref 0.05–1.2)
MONOCYTES NFR BLD: 7 % (ref 3–10)
NEUTS SEG # BLD: 9.4 K/UL (ref 1.8–8)
NEUTS SEG NFR BLD: 65 % (ref 40–73)
NRBC # BLD: 0 K/UL (ref 0–0.01)
NRBC BLD-RTO: 0 PER 100 WBC
PLATELET # BLD AUTO: 233 K/UL (ref 135–420)
PMV BLD AUTO: 11.1 FL (ref 9.2–11.8)
POTASSIUM SERPL-SCNC: 4.3 MMOL/L (ref 3.5–5.5)
RBC # BLD AUTO: 4.49 M/UL (ref 4.2–5.3)
SODIUM SERPL-SCNC: 138 MMOL/L (ref 136–145)
WBC # BLD AUTO: 14.5 K/UL (ref 4.6–13.2)

## 2023-03-05 PROCEDURE — 6360000002 HC RX W HCPCS: Performed by: HOSPITALIST

## 2023-03-05 PROCEDURE — 36415 COLL VENOUS BLD VENIPUNCTURE: CPT

## 2023-03-05 PROCEDURE — 99239 HOSP IP/OBS DSCHRG MGMT >30: CPT | Performed by: HOSPITALIST

## 2023-03-05 PROCEDURE — 80048 BASIC METABOLIC PNL TOTAL CA: CPT

## 2023-03-05 PROCEDURE — 6370000000 HC RX 637 (ALT 250 FOR IP): Performed by: HOSPITALIST

## 2023-03-05 PROCEDURE — 85025 COMPLETE CBC W/AUTO DIFF WBC: CPT

## 2023-03-05 PROCEDURE — 2580000003 HC RX 258: Performed by: HOSPITALIST

## 2023-03-05 RX ORDER — AMOXICILLIN AND CLAVULANATE POTASSIUM 875; 125 MG/1; MG/1
1 TABLET, FILM COATED ORAL 2 TIMES DAILY
Qty: 14 TABLET | Refills: 0 | Status: SHIPPED | OUTPATIENT
Start: 2023-03-05 | End: 2023-03-12

## 2023-03-05 RX ADMIN — NIFEDIPINE 60 MG: 30 TABLET, FILM COATED, EXTENDED RELEASE ORAL at 09:12

## 2023-03-05 RX ADMIN — PRAVASTATIN SODIUM 40 MG: 20 TABLET ORAL at 09:12

## 2023-03-05 RX ADMIN — CLONIDINE HYDROCHLORIDE 0.2 MG: 0.1 TABLET ORAL at 09:12

## 2023-03-05 RX ADMIN — LISINOPRIL 40 MG: 40 TABLET ORAL at 09:12

## 2023-03-05 RX ADMIN — ASPIRIN 81 MG CHEWABLE TABLET 81 MG: 81 TABLET CHEWABLE at 09:12

## 2023-03-05 RX ADMIN — AZITHROMYCIN DIHYDRATE 500 MG: 500 INJECTION, POWDER, LYOPHILIZED, FOR SOLUTION INTRAVENOUS at 09:13

## 2023-03-05 RX ADMIN — ENOXAPARIN SODIUM 40 MG: 100 INJECTION SUBCUTANEOUS at 09:12

## 2023-03-05 RX ADMIN — WATER 1000 MG: 1 INJECTION INTRAMUSCULAR; INTRAVENOUS; SUBCUTANEOUS at 09:13

## 2023-03-05 NOTE — CARE COORDINATION
CM spoke with patient, CM updated patient that she is a possible discharge for today. Patient's son to transport patient home when medically stable for discharge.                Darletta Libman, RN  Case Management [phone]

## 2023-03-05 NOTE — PROGRESS NOTES
Patient seen and evaluated, sitting up in bed, no acute distress. Patient mentions she is feeling much better. Patient treated with IV antibiotics for pneumonia secondary to COVID-19 infection. Patient is currently asymptomatic from COVID-19. Patient mentions she is a resident of [facility]. Discussed with case management to see if facility would be able to accept patient back since she is COVID-positive but asymptomatic. Per case management notes it appears that patient will be accepted back at the facility, will discharge on p.o. antibiotics.

## 2023-03-05 NOTE — CARE COORDINATION
VIGNESH spoke with patient, [address] verbal consent given for [facility]. Patient's daughter to transport patient home today at time of discharge. VIGNESH spoke with Carmen with [facility], said they can take patient. VIGNESH put patient in the queue for [facility].                Josephine Cool, RN  Case Management [phone]

## 2023-03-05 NOTE — PROGRESS NOTES
CM made contact with pt and nurse assistant, who was talking with pt's daughter, via telephone. Pt's daughter will transport pt on this date, Sunday, 3/5/23 to [facility], [facility]..          Geeta Rolon MSW  Care Manager

## 2023-03-05 NOTE — Clinical Note
Patient received orders for discharge home with home health. Patient reviewed the discharge instructions as well as medications and follow up appointments. Patient took all belongings including her purse and her cell phone.  Copy of the

## 2023-03-05 NOTE — CARE COORDINATION
03/05/23 1047   IMM Letter   IMM Letter given to Patient/Family/Significant other/Guardian/POA/by: Verbal consent via Telephone   IMM Letter date given: 03/05/23   IMM Letter time given: 9024

## 2023-03-05 NOTE — CARE COORDINATION
CM placed original IMM letter in patient's chart to be scanned in electronic chart. Copy of IMM left in patient's chart for patient.              Megan Dennis, RN  Case Management [phone]

## 2023-03-05 NOTE — CARE COORDINATION
VIGNESH spoke with Christiano Bowie, said patient has discharged.            Stew Ann, RN  Case Management [phone]

## 2023-03-05 NOTE — DISCHARGE SUMMARY
[address]         Hospitalist Discharge Summary    Patient: Marjan Barcenas MRN: [id number]  CSN: [id number]    YOB: 1934  Age: 80 y.o. Sex: female    DOA: 3/3/2023 LOS:  LOS: 2 days   Discharge Date:     Admission Diagnoses: Pneumonia due to infectious organism, unspecified laterality, unspecified part of lung [J18.9]  Pneumonia due to COVID-19 virus [U07.1, J12.82]  COVID-19 [U07.1]    Discharge Diagnoses:    Pneumonia due to COVID-19 infection  COVID-19 infection  History of hypertension  History of type 2 diabetes  History of COPD    Discharge Condition: [address]    Discharge To: Home    CODE STATUS: Full Code       PHYSICAL EXAM  Visit Vitals  /78   Pulse 70   Temp 97.8 °F (36.6 °C) (Oral)   Resp 18   Ht 5' 4\" (1.626 m)   Wt 160 lb 8 oz (72.8 kg)   SpO2 98%   BMI 27.55 kg/m²       General: Alert, cooperative, no acute distress    Lungs:  CTA Bilaterally. No Wheezing/Rhonchi/Rales. Heart:  Regular rate and Rhythm. Abdomen: Soft, Non distended, Non tender. + Bowel sounds. Extremities: No edema/ cyanosis/ clubbing  Neurologic:  AA oriented X 3. Moves all extremities. HPI:    60-year-old female with a history of hypertension, hyperlipidemia, COPD presents to the emergency room secondary to shortness of breath and fall. Patient mentions she has had 3 falls recently and has been unsteady on her feet. She also mentions that she she walked a small distance and became short of breath more than usual, so presented to the emergency room for further evaluation. She is unclear if she is on any supplemental oxygen at home. ER evaluation patient tested positive for COVID-19 infection, chest x-ray concerning for possible infiltrate. Patient started on broad-spectrum IV antibiotics, placed on supplemental oxygen and admitted to the hospital for further evaluation.        Hospital Course:     Patient admitted to the hospital secondary to recent falls and shortness of breath. ER evaluation patient tested positive for COVID-19 and was started on IV antibiotics with chest x-ray concerning for possible infiltrate. Patient also noted to have leukocytosis. Patient improved with IV antibiotics. She is currently not on any supplemental oxygen. Patient lives in a senior living and is currently being discharged on p.o. antibiotics. She will continue home health. Follow up Care: With PCP in 2 weeks    Consults: None    Significant Diagnostic Studies:     Imaging:  No results found.        Procedures:     None        Medication List        START taking these medications      amoxicillin-clavulanate 875-125 MG per tablet  Commonly known as: AUGMENTIN  Take 1 tablet by mouth 2 times daily for 7 days            CONTINUE taking these medications      aspirin 81 MG chewable tablet     cloNIDine 0.2 MG tablet  Commonly known as: CATAPRES     fluticasone 50 MCG/ACT nasal spray  Commonly known as: FLONASE     glimepiride 4 MG tablet  Commonly known as: AMARYL     ipratropium-albuterol 0.5-2.5 (3) MG/3ML Soln nebulizer solution  Commonly known as: DUONEB     lisinopril 20 MG tablet  Commonly known as: PRINIVIL;ZESTRIL     NIFEdipine 60 MG extended release tablet  Commonly known as: PROCARDIA XL     pravastatin 40 MG tablet  Commonly known as: PRAVACHOL     Trelegy Ellipta 100-62.5-25 MCG/ACT Aepb inhaler  Generic drug: fluticasone-umeclidin-vilant            STOP taking these medications      acetylcysteine 20 % nebulizer solution  Commonly known as: MUCOMYST     albuterol sulfate  (90 Base) MCG/ACT inhaler  Commonly known as: PROVENTIL;VENTOLIN;PROAIR     Metoprolol Succinate 200 MG Cs24     verapamil 180 MG extended release tablet  Commonly known as: CALAN SR     vitamin D 25 MCG (1000 UT) Caps               Where to Get Your Medications        These medications were sent to [address], [address] [phone] [facility] [address] [place], [address] [facility]      Phone: [phone]   amoxicillin-clavulanate 875-125 MG per tablet          Activity: activity as tolerated    Diet: cardiac diet    Wound Care: none needed      Tyler Helms MD  3/5/2023, 12:45 PM    Total time spent 35 mins  Disclaimer: Sections of this note are dictated using utilizing voice recognition software. Minor typographical errors may be present. If questions arise, please do not hesitate to contact me or call our department.

## 2023-03-05 NOTE — CARE COORDINATION
Discharge order noted for today. Pt has been accepted to [facility] agency. Spoke with patient and she is agreeable to the transition plan today. Transport has been arranged through patient's daughter. Patient's discharge summary and home health  orders have been forwarded to [facility] home health  agency via [address]  Discharge information has been documented on the AVS.           Alix Marcelo RN  Case Management [phone]

## 2023-03-06 ENCOUNTER — HOME CARE VISIT (OUTPATIENT)
Age: 88
End: 2023-03-06

## 2023-03-06 PROCEDURE — 0221000100 HH NO PAY CLAIM PROCEDURE

## 2023-03-06 PROCEDURE — G0151 HHCP-SERV OF PT,EA 15 MIN: HCPCS

## 2023-03-07 ENCOUNTER — HOME CARE VISIT (OUTPATIENT)
Age: 88
End: 2023-03-07

## 2023-03-07 VITALS
RESPIRATION RATE: 20 BRPM | OXYGEN SATURATION: 96 % | HEART RATE: 80 BPM | TEMPERATURE: 97.8 F | SYSTOLIC BLOOD PRESSURE: 138 MMHG | DIASTOLIC BLOOD PRESSURE: 74 MMHG

## 2023-03-07 PROCEDURE — G0299 HHS/HOSPICE OF RN EA 15 MIN: HCPCS

## 2023-03-07 NOTE — Clinical Note
Robby Flores was seen today by skilled nursing. Patient is doing well.  patient has no wounds, all meds are in the home, her daughter assists with transportation as needed.     Thanks,  Beryle Frees, RN

## 2023-03-08 VITALS
OXYGEN SATURATION: 95 % | TEMPERATURE: 98.2 F | HEART RATE: 100 BPM | DIASTOLIC BLOOD PRESSURE: 70 MMHG | SYSTOLIC BLOOD PRESSURE: 120 MMHG

## 2023-03-08 ASSESSMENT — ENCOUNTER SYMPTOMS
DYSPNEA ACTIVITY LEVEL: AT REST
COUGH: 1
COUGH CHARACTERISTICS: NON-PRODUCTIVE
DYSPNEA ACTIVITY LEVEL: AFTER AMBULATING MORE THAN 20 FT

## 2023-03-08 NOTE — HOME HEALTH
Skilled reason for visit: DISEASE MED MANAGEMENT, PHYSICAL ASSESSMENT, VITAL SIGNS,  PDGM Dx: Pneumonia due to COVID-19 infection   List of Comorbidities: 1. Pneumonia due to COVID-19 infection    2. COVID-19 infection    3. History of hypertension    4. History of type 2 diabetes    5. History of COPD  Caregiver involvement: family, iadls, adls, meal prep, med management, taking to md appointments. Medications reviewed and all medications are available in the home this visit. The following education was provided regarding medications:  patient/cg reminded to continue to take medications as prescribed. patient aware to monitor for effectiveness and to notify staff of any adverse reactions to medications/any changes to medication regimen. .    MD notified of any discrepancies/look a-like medications/medication interactions: NA  Medications are EFFECTIVE at this time. Home health supplies by type and quantity ordered/delivered this visit include: NA    Patient education provided this visit: encouraged patient to get three nutritional meals daily and to stay hydrated, drink plenty of fluids. patient made aware to monitor for s/s of infection [increased swelling, increased redness around site, increased pain, foul smelling drainage, fever] aware who to report to/when. Sharps education provided: NA - PATIENT DOES NOT CHECK HER BLOOD SUGAR LEVELS    Patient level of understanding of education provided: PATIENT/CG  WAS ABLE TO REPEAT BACK AND VOICED UNDERSTANDING OF ALL EDUCATION PROVIDED.     Skilled Care Performed this visit: SAME AS REASON FOR VISIT    Patient response to procedure performed:   PATIENT TOLERATED WELL WITH NO C/O OF INCREASED PAIN OR DISCOMFORT    Agency Progress toward goals: PROGRESSING     Patient's Progress towards personal goals: progressing    Home exercise program: PATIENT TO TAKE ALL MEDS AS ORDERED, DO ICS X10/HR WHILE AWAKE, DRINK PLENTY OF FLUIDS,    Continued need for the following skills: Nursing, Physical Therapy, Occupational Therapy and [address] for next visit - disease med management

## 2023-03-09 NOTE — HOME HEALTH
HPI: Patient fell a few times and then became short of breath wit usual activity. She went to the ER and was tested positive for Pneumonia and for covid19 3/3/2023. She was admitted to [facility].  PMHx:  COPD, DM type 2, HTN  SUBJECTIVE:  Patient presents as busy and trying to get organized. She recently moved and has little furniture. She appears to be very independent minded. LIVING SITUATION:  Ms Alex Ni lives alone in a 3rd floor apartment where she recently moved. CAREGIVER INVOLVEMENT/ASSISTANCE NEEDED FOR:  Her daughter is available intermittantly to assist her. PLOF:  Independent. MEDICATIONS RECONCILED AND UPDATED AS FOLLOWS: No issues. High risk medication teaching regarding anticoagulants, hyperglycemic agents or opioid narcotics performed for: Hypoglycemic Glimeperide. Instructed patient on Hypoglycemic drug, watch for s/s of hypo or hyperglycemia. The following medication discrepancies/interactions were noted: none. notified. NEXT MD APPT:  TBD. Patient/caregiver encouraged/instructed to keep appointment as lack of follow through with physician appointment could result in discontinuation of home care services for non-compliance. .  ROM:   BLEs WFL  STRENGTH: BLEs 4/5 per MMT, WNL  WOUNDS:  n/a. BED MOBILITY: Independent  TRANSFERS: Independent, safe, uses hand support  GAIT: No AD. Gait characterized by quick saad and speed, no deviation. Cues/instruction provided for energy conservation and breathing with activity. STAIRS:  Uses elevator  BALANCE:  Pt scored 26/28 on Tinetti Balance Assessment placing patient at low risk for falls. PATIENT RESPONSE TO TREATMENT: Patient was mildly fatigued. She verbalized she had a better understanding of her meds. Sweetie Melvin   PATIENT EDUCATION PROVIDED THIS VISIT:  Home safety to include Stay in apartment away form people for 10 days, until 3/13/23., HEP, walking, deep breathing, HIPAA, HHBOR     PATIENT RESPONSE TO EDUCATION PROVIDED: Verbalized understanding  . HEP consisting of:  1. Walking every hour during the day with no AD in apartment   2. Pursed lip breathing to slow RR and improve breath control after activity and during as needed. Written HEP issued, patient/caregiver verbalized understanding. .  ASSESSMENT AND CONTINUED NEED FOR THE FOLLOWING SKILLS:   Patient presents following a brief hosptal episode with good strength and balance and is safely navigating her home. She is homebound due to that she is s/p Pneumonia and covid19. At this time, HHPT is not indicated; however, she is apporpriate for [facility] for med instruction and to help her organize the meds. No further HHPT is planned at this time. .   POC:  Patient/caregiver instructed on POC and are agreeable to POC at this time. POC and admission to home health status called to [facility] [address]. PLAN: PT eval only, defer to [facility] for eval and treat as appropriate    DISCHARGE PLANNING DISCUSSED: Discharge to self and family under MD supervision once all goals have been met or patient has reached max potential. Patient/caregiver verbalized understanding.

## 2023-03-10 ENCOUNTER — HOME CARE VISIT (OUTPATIENT)
Age: 88
End: 2023-03-10

## 2023-03-10 VITALS
TEMPERATURE: 98.5 F | OXYGEN SATURATION: 99 % | HEART RATE: 90 BPM | DIASTOLIC BLOOD PRESSURE: 68 MMHG | SYSTOLIC BLOOD PRESSURE: 148 MMHG | RESPIRATION RATE: 18 BRPM

## 2023-03-10 PROCEDURE — G0299 HHS/HOSPICE OF RN EA 15 MIN: HCPCS

## 2023-03-10 ASSESSMENT — ENCOUNTER SYMPTOMS: DYSPNEA ACTIVITY LEVEL: WHILE EATING

## 2023-03-10 NOTE — HOME HEALTH
Skilled reason for visit: DISEASE MED MANAGEMENT, PHYSICAL ASSESSMENT, VITAL SIGNS, PATIENT NOT ABLE TO SIGN DUE TO BEING COVID +  PATTIENT HAS NO QUESTIONS OR CONCERNS THIS VIST. PDGM Dx: Pneumonia due to COVID-19 infection   Caregiver involvement: family, iadls, adls, meal prep, med management, taking to md appointments. Patient lives alone in a senior apartment building, daughter comes by dialy and prn  Medications reviewed and all medications are available in the home this visit. The following education was provided regarding medications:  patient/cg reminded to continue to take medications as prescribed. patient aware to monitor for effectiveness and to notify staff of any adverse reactions to medications/any changes to medication regimen. .    MD notified of any discrepancies/look a-like medications/medication interactions: NA  Medications are EFFECTIVE at this time. Home health supplies by type and quantity ordered/delivered this visit include: NA    Patient education provided this visit: encouraged patient to get three nutritional meals daily and to stay hydrated, drink plenty of fluids. patient made aware to monitor for s/s of infection [increased swelling, increased redness around site, increased pain, foul smelling drainage, fever] aware who to report to/when. Sharps education provided: NA - does not check blood sugar, per patient    Patient level of understanding of education provided: PATIENT/CG  WAS ABLE TO REPEAT BACK AND VOICED UNDERSTANDING OF ALL EDUCATION PROVIDED.     Skilled Care Performed this visit: SAME AS REASON FOR VISIT    Patient response to procedure performed:   PATIENT TOLERATED WELL WITH NO C/O OF INCREASED PAIN OR DISCOMFORT    Agency Progress toward goals: PROGRESSING     Patient's Progress towards personal goals: progressing    Home exercise program: PATIENT TO TAKE ALL MEDS AS ORDERED, DO ICS X10/HR WHILE AWAKE, DRINK PLENTY OF FLUIDS,    Continued need for the following skills: Nursing, Physical Therapy, Occupational Therapy and [address] for next visit= disease med management

## 2023-03-12 ENCOUNTER — HOME CARE VISIT (OUTPATIENT)
Age: 88
End: 2023-03-12

## 2023-03-14 ENCOUNTER — HOME CARE VISIT (OUTPATIENT)
Age: 88
End: 2023-03-14
Payer: MEDICARE

## 2023-03-14 ENCOUNTER — TRANSCRIBE ORDERS (OUTPATIENT)
Facility: HOSPITAL | Age: 88
End: 2023-03-14

## 2023-03-14 DIAGNOSIS — R92.8 ABNORMAL MAMMOGRAM: Primary | ICD-10-CM

## 2023-03-15 ENCOUNTER — HOME CARE VISIT (OUTPATIENT)
Age: 88
End: 2023-03-15
Payer: MEDICARE

## 2023-03-15 PROCEDURE — G0300 HHS/HOSPICE OF LPN EA 15 MIN: HCPCS

## 2023-03-16 ENCOUNTER — HOME CARE VISIT (OUTPATIENT)
Age: 88
End: 2023-03-16
Payer: MEDICARE

## 2023-03-16 VITALS
HEART RATE: 78 BPM | TEMPERATURE: 98.6 F | RESPIRATION RATE: 20 BRPM | SYSTOLIC BLOOD PRESSURE: 142 MMHG | DIASTOLIC BLOOD PRESSURE: 69 MMHG | OXYGEN SATURATION: 97 %

## 2023-03-16 PROCEDURE — G0152 HHCP-SERV OF OT,EA 15 MIN: HCPCS

## 2023-03-16 NOTE — Clinical Note
thank you  ----- Message -----  From: NAY Brown  Sent: 3/17/2023   9:24 AM EDT  To: Mayra Payne, PT, Tha Turner, DAISHA      Est number therapy visits  1wk1 OT eval only. Pt at baseline for ADLs. Pt does not shower and her baseline is spongebathing. Pt has no goals for showering or bathing in a tub.      Therapy Functional Score Assessment     Grooming          0 = Groom Unaided, with or without devices independently      Upper Dressing   Item includes prosthetics, orthotics, braces, and support devices      0 = Can get clothes, don and doff, without assistance       Lower Dressing   Item includes prosthetics, orthotics, braces, and support devices      0 = Can get clothes, don and doff clothing and shoes independently     Bathing           2 = Needs intermittent assistance/supervision in shower or tub- SAFELY     Toilet Transfer     0 = Independent in getting to and from; on and off toilet with or without a device     Transfer       0 = Independent Transfer    [address]            0 = Independent no human assist and no device on any surface and stairs with/without rail     Dyspnea                       1 = SOB when walking 20 ft or climbing stairs

## 2023-03-16 NOTE — HOME HEALTH
Skilled reason for visit: PNA via covid  Caregiver involvement: family for assistance as needed. Medications reviewed and all medications are available in the home this visit. The following education was provided regarding medications, medication interactions, and look alike medications (specify): no med changes noted or reported. Medications  are effective at this time. Home health supplies by type and quantity ordered/delivered this visit include: none  Patient education provided this visit:SN instructed patient and caregiver on how to prevent spread Coronavirus (COVID-19), wash hands frequently with soap and water for 20 seconds, cover your mouth and nose with a tissue when coughing or sneezing, use the nearest waste receptacle to dispose tissue after use, do not touch your eyes, nose or mouth with unwashed hands. Progress toward goals: .pt progressing toward improving health with taking all medications as prescribed and keeping all medical appts. pt verbalizes understanding of education provided.  Pt verbalize understanding of education provided      Home exercise program: Pt to exercise as tolerated  Continued need for the following skills: nursing  Patient and/or caregiver notified and agrees to changes in the Plan of Care n/a  Patient will be discharged once education and wounds if applicable has been completed, patient is medically stable, and all goals met

## 2023-03-17 ENCOUNTER — HOME CARE VISIT (OUTPATIENT)
Age: 88
End: 2023-03-17
Payer: MEDICARE

## 2023-03-17 VITALS
DIASTOLIC BLOOD PRESSURE: 67 MMHG | RESPIRATION RATE: 18 BRPM | HEART RATE: 71 BPM | TEMPERATURE: 98.1 F | SYSTOLIC BLOOD PRESSURE: 118 MMHG | OXYGEN SATURATION: 99 %

## 2023-03-17 PROCEDURE — G0299 HHS/HOSPICE OF RN EA 15 MIN: HCPCS

## 2023-03-17 NOTE — HOME HEALTH
Occupational therapy evaluation          PMHx: Primary Dx: [id number] [U07.1       SUBJECTIVE:  I am doing great, I could play volley ball     OBJECTIVE:   Pt admitted to home care from acute care stay for COVID and pneumonia. LIVING SITUATION:  Pt lives alone in one bedroom senior apartment independent living. Pt has grabbars in bathroom, no carpet, clear pathways. Pts apartment has an elevator and no steps to enter. CAREGIVER INVOLVEMENT/ASSISTANCE NEEDED FOR:  NA. Pt has a son who lives locally who checks in on pt   PLOF:  I with ADLs, requires someone to drive her but I with IADLs such as grocery shopping     [address] chart review   NEXT MD APPT:  Pt is note clear  . ROM: WNL B UE  STRENGTH: WNL B UE  FEEDING: I  GROOMING: I  UB DRESSING: I  LB DRESSING: I  BATHING: Pts baseline is spongebathing. Pt does not like to shower. Pt did demo a shower transfer with mod I but stated she does not and will not be using her shower. I  TOILETING: I  IADL: I    BALANCE:    STATIC STANDING: G  DYNAMIC STANDING: G  Endurance is good    EOB/BED TRANSFER: I  CHAIR:  I  TOILET/BSC: I  TUB SHOWER/SHOWER CHAIR/TUB BENCH: mod I with grabbars  AMBULATION: I with no AD  . SPECIAL TESTS PERFORMED AND CLINICAL SIGNIFICANCE OF TEST(S):   Barthel index 95/100 demonstrated high independence level. PATIENT RESPONSE TO TREATMENT: no increase in pain or vitals. No decrease in SpO2 during activities     PATIENT EDUCATION PROVIDED THIS VISIT:  importance of daily walking   PATIENT RESPONSE TO EDUCATION PROVIDED: Pt stated that now she is off COVID restrictions she will be walking the halls and going out with family  . HEP consisting of:  Safety with ADLs  Written HEP issued. .  ASSESSMENT AND CONTINUED NEED FOR THE FOLLOWING SKILLS:   Pt is at baseline for ADLs and IADLs. No recommended HHOT at this time. Pt in agreement  .    DISCHARGE PLANNING DISCUSSED: OT eval only     PLAN:OT eval only    MD notified through case communication

## 2023-03-17 NOTE — Clinical Note
Patient is meeting all SN goals at this time and is ready for agency discharge to follow up with PCP/ Surgeon. Patient is aware to follow up with PCP/Surgeon as ordered. Opportunity for questions provided- none at this time. Patient aware to refer an  y questions after DC to MD office.       Respectfully,  Leonel Kamara RN

## 2023-03-17 NOTE — CASE COMMUNICATION
Est number therapy visits  1wk1 OT eval only. Pt at baseline for ADLs. Pt does not shower and her baseline is spongebathing. Pt has no goals for showering or bathing in a tub.      Therapy Functional Score Assessment     Grooming          0 = Groom Unaided, with or without devices independently      Upper Dressing   Item includes prosthetics, orthotics, braces, and support devices      0 = Can get clothes, don and doff, without  assistance       Lower Dressing   Item includes prosthetics, orthotics, braces, and support devices      0 = Can get clothes, don and doff clothing and shoes independently     Bathing           2 = Needs intermittent assistance/supervision in shower or tub- SAFELY     Toilet Transfer     0 = Independent in getting to and from; on and off toilet with or without a device     Transfer       0 = Independent Transfer     1860 Ambulation            0 = Independent no human assist and no device on any surface and stairs with/without rail     Dyspnea                       1 = SOB when walking 20 ft or climbing stairs

## 2023-03-19 VITALS
RESPIRATION RATE: 18 BRPM | HEART RATE: 90 BPM | OXYGEN SATURATION: 99 % | DIASTOLIC BLOOD PRESSURE: 64 MMHG | TEMPERATURE: 97.3 F | SYSTOLIC BLOOD PRESSURE: 138 MMHG

## 2023-03-19 ASSESSMENT — ENCOUNTER SYMPTOMS: DYSPNEA ACTIVITY LEVEL: AFTER AMBULATING MORE THAN 20 FT

## 2023-03-19 NOTE — HOME HEALTH
Skilled reason for visit: DISEASE MED MANAGEMENT, PHYSICAL ASSESSMENT, VITAL SIGNS, PATIENT NOT ABLE TO SIGN DUE TO BEING COVID +  PATIENT STATES THAT SHE DOES NOT NEED ANY FURTHER HH CARE, WHICH IS TRUE, PATIENT BEING DISCHARGED THIS VISIT  PATTIENT HAS NO QUESTIONS OR CONCERNS THIS VIST. PDGM Dx: Pneumonia due to COVID-19 infection     Caregiver involvement: family, iadls, adls, meal prep, med management, taking to md appointments. Patient lives alone in a senior apartment building, daughter comes by dialy and prn    Medications reviewed and all medications are available in the home this visit. The following education was provided regarding medications:  patient/cg reminded to continue to take medications as prescribed. patient aware to monitor for effectiveness and to notify staff of any adverse reactions to medications/any changes to medication regimen. .      MD notified of any discrepancies/look a-like medications/medication interactions: NA    Medications are EFFECTIVE at this time. Home health supplies by type and quantity ordered/delivered this visit include: NA    Patient education provided this visit: encouraged patient to get three nutritional meals daily and to stay hydrated, drink plenty of fluids. patient made aware to monitor for s/s of infection [increased swelling, increased redness around site, increased pain, foul smelling drainage, fever] aware who to report to/when. Sharps education provided: NA - does not check blood sugar, per patient    Patient level of understanding of education provided: PATIENT/CG  WAS ABLE TO REPEAT BACK AND VOICED UNDERSTANDING OF ALL EDUCATION PROVIDED.     Skilled Care Performed this visit: SAME AS REASON FOR VISIT    Patient response to procedure performed:   PATIENT TOLERATED WELL WITH NO C/O OF INCREASED PAIN OR DISCOMFORT    Agency Progress toward goals: GOALS MET    Patient's Progress towards personal goals: GOALS MET    Home exercise program: PATIENT TO TAKE ALL MEDS AS ORDERED, DO ICS X10/HR WHILE AWAKE, DRINK PLENTY OF FLUIDS,    Continued need for the following skills: NA    Plan for next visit= N/A

## 2023-04-04 ENCOUNTER — HOSPITAL ENCOUNTER (OUTPATIENT)
Facility: HOSPITAL | Age: 88
Discharge: HOME | End: 2023-04-07
Payer: MEDICARE

## 2023-04-04 LAB
ALBUMIN SERPL-MCNC: 4 G/DL (ref 3.4–5)
ALBUMIN/GLOB SERPL: 1 (ref 0.8–1.7)
ALP SERPL-CCNC: 110 U/L (ref 45–117)
ALT SERPL-CCNC: 21 U/L (ref 13–56)
ANION GAP SERPL CALC-SCNC: 7 MMOL/L (ref 3–18)
AST SERPL-CCNC: 22 U/L (ref 10–38)
BASOPHILS # BLD: 0.1 K/UL (ref 0–0.1)
BASOPHILS NFR BLD: 1 % (ref 0–2)
BILIRUB SERPL-MCNC: 0.5 MG/DL (ref 0.2–1)
BUN SERPL-MCNC: 19 MG/DL (ref 7–18)
BUN/CREAT SERPL: 18 (ref 12–20)
CALCIUM SERPL-MCNC: 10.5 MG/DL (ref 8.5–10.1)
CHLORIDE SERPL-SCNC: 107 MMOL/L (ref 100–111)
CHOLEST SERPL-MCNC: 178 MG/DL
CO2 SERPL-SCNC: 28 MMOL/L (ref 21–32)
CREAT SERPL-MCNC: 1.07 MG/DL (ref 0.6–1.3)
DIFFERENTIAL METHOD BLD: NORMAL
EOSINOPHIL # BLD: 0.2 K/UL (ref 0–0.4)
EOSINOPHIL NFR BLD: 2 % (ref 0–5)
ERYTHROCYTE [DISTWIDTH] IN BLOOD BY AUTOMATED COUNT: 13.5 % (ref 11.6–14.5)
GLOBULIN SER CALC-MCNC: 4.1 G/DL (ref 2–4)
GLUCOSE SERPL-MCNC: 89 MG/DL (ref 74–99)
HCT VFR BLD AUTO: 41.2 % (ref 35–45)
HDLC SERPL-MCNC: 84 MG/DL (ref 40–60)
HDLC SERPL: 2.1 (ref 0–5)
HGB BLD-MCNC: 13.3 G/DL (ref 12–16)
IMM GRANULOCYTES # BLD AUTO: 0 K/UL (ref 0–0.04)
IMM GRANULOCYTES NFR BLD AUTO: 0 % (ref 0–0.5)
LDLC SERPL CALC-MCNC: 81.2 MG/DL (ref 0–100)
LIPID PANEL: ABNORMAL
LYMPHOCYTES # BLD: 2.4 K/UL (ref 0.9–3.6)
LYMPHOCYTES NFR BLD: 30 % (ref 21–52)
MCH RBC QN AUTO: 27.1 PG (ref 24–34)
MCHC RBC AUTO-ENTMCNC: 32.3 G/DL (ref 31–37)
MCV RBC AUTO: 84.1 FL (ref 78–100)
MONOCYTES # BLD: 0.4 K/UL (ref 0.05–1.2)
MONOCYTES NFR BLD: 6 % (ref 3–10)
NEUTS SEG # BLD: 5 K/UL (ref 1.8–8)
NEUTS SEG NFR BLD: 61 % (ref 40–73)
NRBC # BLD: 0 K/UL (ref 0–0.01)
NRBC BLD-RTO: 0 PER 100 WBC
PLATELET # BLD AUTO: 309 K/UL (ref 135–420)
PMV BLD AUTO: 10.8 FL (ref 9.2–11.8)
POTASSIUM SERPL-SCNC: 4.1 MMOL/L (ref 3.5–5.5)
PROT SERPL-MCNC: 8.1 G/DL (ref 6.4–8.2)
RBC # BLD AUTO: 4.9 M/UL (ref 4.2–5.3)
SODIUM SERPL-SCNC: 142 MMOL/L (ref 136–145)
TRIGL SERPL-MCNC: 64 MG/DL
TSH SERPL DL<=0.05 MIU/L-ACNC: 0.78 UIU/ML (ref 0.36–3.74)
VLDLC SERPL CALC-MCNC: 12.8 MG/DL
WBC # BLD AUTO: 8.1 K/UL (ref 4.6–13.2)

## 2023-04-04 PROCEDURE — 80053 COMPREHEN METABOLIC PANEL: CPT

## 2023-04-04 PROCEDURE — 85025 COMPLETE CBC W/AUTO DIFF WBC: CPT

## 2023-04-04 PROCEDURE — 84443 ASSAY THYROID STIM HORMONE: CPT

## 2023-04-04 PROCEDURE — 36415 COLL VENOUS BLD VENIPUNCTURE: CPT

## 2023-04-04 PROCEDURE — 80061 LIPID PANEL: CPT

## 2023-05-10 ENCOUNTER — HOSPITAL ENCOUNTER (OUTPATIENT)
Facility: HOSPITAL | Age: 88
Discharge: HOME | End: 2023-05-13
Payer: MEDICARE

## 2023-05-10 DIAGNOSIS — N63.10 MASS OF RIGHT BREAST, UNSPECIFIED QUADRANT: ICD-10-CM

## 2023-05-10 DIAGNOSIS — R92.8 ABNORMAL MAMMOGRAM: ICD-10-CM

## 2023-05-10 PROCEDURE — A4648 IMPLANTABLE TISSUE MARKER: HCPCS

## 2023-05-10 PROCEDURE — 88342 IMHCHEM/IMCYTCHM 1ST ANTB: CPT

## 2023-05-10 PROCEDURE — 2500000003 HC RX 250 WO HCPCS: Performed by: NURSE PRACTITIONER

## 2023-05-10 PROCEDURE — 77065 DX MAMMO INCL CAD UNI: CPT

## 2023-05-10 PROCEDURE — 88305 TISSUE EXAM BY PATHOLOGIST: CPT

## 2023-05-10 PROCEDURE — 88341 IMHCHEM/IMCYTCHM EA ADD ANTB: CPT

## 2023-05-10 RX ORDER — LIDOCAINE HYDROCHLORIDE 10 MG/ML
3 INJECTION, SOLUTION EPIDURAL; INFILTRATION; INTRACAUDAL; PERINEURAL ONCE
Status: COMPLETED | OUTPATIENT
Start: 2023-05-10 | End: 2023-05-10

## 2023-05-10 RX ADMIN — LIDOCAINE HYDROCHLORIDE 3 ML: 10 INJECTION, SOLUTION EPIDURAL; INFILTRATION; INTRACAUDAL; PERINEURAL at 13:10

## 2023-05-10 RX ADMIN — LIDOCAINE HYDROCHLORIDE 20 ML: 10; .005 INJECTION, SOLUTION EPIDURAL; INFILTRATION; INTRACAUDAL; PERINEURAL at 13:11

## 2024-09-24 ENCOUNTER — HOSPITAL ENCOUNTER (OUTPATIENT)
Facility: HOSPITAL | Age: 89
Discharge: HOME | End: 2024-09-27
Payer: MEDICARE

## 2024-09-24 VITALS — HEIGHT: 64 IN | WEIGHT: 132 LBS | BODY MASS INDEX: 22.53 KG/M2

## 2024-09-24 DIAGNOSIS — Z12.31 VISIT FOR SCREENING MAMMOGRAM: ICD-10-CM

## 2024-09-24 PROCEDURE — 77063 BREAST TOMOSYNTHESIS BI: CPT
